# Patient Record
Sex: FEMALE | Race: OTHER | HISPANIC OR LATINO | Employment: FULL TIME | URBAN - METROPOLITAN AREA
[De-identification: names, ages, dates, MRNs, and addresses within clinical notes are randomized per-mention and may not be internally consistent; named-entity substitution may affect disease eponyms.]

---

## 2020-10-15 ENCOUNTER — OFFICE VISIT (OUTPATIENT)
Dept: FAMILY MEDICINE | Facility: CLINIC | Age: 45
End: 2020-10-15
Payer: COMMERCIAL

## 2020-10-15 VITALS
HEIGHT: 60 IN | HEART RATE: 87 BPM | BODY MASS INDEX: 25.73 KG/M2 | OXYGEN SATURATION: 98 % | TEMPERATURE: 98 F | DIASTOLIC BLOOD PRESSURE: 78 MMHG | WEIGHT: 131.06 LBS | SYSTOLIC BLOOD PRESSURE: 104 MMHG

## 2020-10-15 DIAGNOSIS — Z00.00 HEALTH CARE MAINTENANCE: ICD-10-CM

## 2020-10-15 DIAGNOSIS — M25.50 MULTIPLE JOINT PAIN: Primary | ICD-10-CM

## 2020-10-15 PROCEDURE — 3008F PR BODY MASS INDEX (BMI) DOCUMENTED: ICD-10-PCS | Mod: CPTII,S$GLB,, | Performed by: INTERNAL MEDICINE

## 2020-10-15 PROCEDURE — 3008F BODY MASS INDEX DOCD: CPT | Mod: CPTII,S$GLB,, | Performed by: INTERNAL MEDICINE

## 2020-10-15 PROCEDURE — 99999 PR PBB SHADOW E&M-NEW PATIENT-LVL III: ICD-10-PCS | Mod: PBBFAC,,, | Performed by: INTERNAL MEDICINE

## 2020-10-15 PROCEDURE — 99204 OFFICE O/P NEW MOD 45 MIN: CPT | Mod: S$GLB,,, | Performed by: INTERNAL MEDICINE

## 2020-10-15 PROCEDURE — 99204 PR OFFICE/OUTPT VISIT, NEW, LEVL IV, 45-59 MIN: ICD-10-PCS | Mod: S$GLB,,, | Performed by: INTERNAL MEDICINE

## 2020-10-15 PROCEDURE — 99999 PR PBB SHADOW E&M-NEW PATIENT-LVL III: CPT | Mod: PBBFAC,,, | Performed by: INTERNAL MEDICINE

## 2020-10-15 RX ORDER — IBUPROFEN 400 MG/1
400 TABLET ORAL EVERY 4 HOURS
COMMUNITY
End: 2020-10-21

## 2020-10-15 NOTE — ASSESSMENT & PLAN NOTE
Pt with polyarticular edema and warmth of acute onset, will send for basic rheum work up and acute hep panel, urgent rheum ref requested

## 2020-10-15 NOTE — PROGRESS NOTES
[facility] Note    Chief Complaint    No chief complaint on file.    History of Present Illness      Mckenna Martinez is a 44 y.o. female who presents today for chief complaint est care. Patient is new to me.    PCP: Primary Doctor No  Patient comes to appointment alone .     HPI   Multiple joint pain: bilat shoulders, bilateral wrists, ankles, knees, has been having joint pain for about 2 weeks, she originally attributed to exercise, tried to cut back but pain persisted. 2d ago awoke with diffuse joint pain and edema  rom limited by edema, mildly painful, achy type pain, no fever, no nusea vomiting abd pain no rash   Has h/o eczema - sees a  at  using dupexient   No h/o blood clots  No rash, no oral ulcers  No photosensitive rash   np fam hx autoimmune disease     Sees Gyn Dr Ankush León   Active Problem List with Overview Notes    Diagnosis Date Noted    Multiple joint pain 10/15/2020       Health Maintenance   Topic Date Due    Hepatitis C Screening  1975    Lipid Panel  1975    TETANUS VACCINE  10/19/1992    Mammogram  10/19/2014       History reviewed. No pertinent past medical history.    History reviewed. No pertinent surgical history.    family history is not on file.    Social History     Tobacco Use    Smoking status: Never Smoker    Smokeless tobacco: Never Used   Substance Use Topics    Alcohol use: Not on file    Drug use: Not on file       Review of Systems   Constitutional: Negative for chills, fever, malaise/fatigue and weight loss.   Respiratory: Negative for cough, sputum production, shortness of breath and wheezing.    Gastrointestinal: Negative for abdominal pain, diarrhea, nausea and vomiting.   Musculoskeletal: Positive for joint pain, myalgias and neck pain. Negative for back pain.   Skin: Negative for rash.        Outpatient Encounter Medications as of 10/15/2020   Medication Sig Dispense Refill    ibuprofen (ADVIL,MOTRIN) 400 MG  tablet Take 400 mg by mouth every 4 (four) hours.      NAPROXEN ORAL Take 200 mg by mouth.       No facility-administered encounter medications on file as of 10/15/2020.         Review of patient's allergies indicates:  No Known Allergies        Physical Exam      Vital Signs  Temp: 98 °F (36.7 °C)  Temp src: Oral  Pulse: 87  SpO2: 98 %  BP: 104/78  Pain Score:   6  Pain Loc: Shoulder(shoulder and body pain)  Height and Weight  Height: 5' (152.4 cm)  Weight: 59.5 kg (131 lb 1 oz)  BSA (Calculated - sq m): 1.59 sq meters  BMI (Calculated): 25.6  Weight in (lb) to have BMI = 25: 127.7]    Physical Exam  Constitutional:       Appearance: She is well-developed.   HENT:      Head: Normocephalic and atraumatic.      Right Ear: External ear normal.      Left Ear: External ear normal.   Eyes:      General:         Right eye: No discharge.         Left eye: No discharge.   Neck:      Musculoskeletal: Normal range of motion.      Thyroid: No thyromegaly.   Cardiovascular:      Rate and Rhythm: Normal rate and regular rhythm.      Heart sounds: Normal heart sounds. No murmur.   Pulmonary:      Effort: Pulmonary effort is normal. No respiratory distress.      Breath sounds: Normal breath sounds.   Abdominal:      General: Bowel sounds are normal. There is no distension.      Palpations: Abdomen is soft.      Tenderness: There is no abdominal tenderness.   Musculoskeletal: Normal range of motion.         General: Swelling and tenderness present. No deformity.      Comments: bilat ankle and knee edmea, bilat wrist edema r>l no erythema, r wrsit r ankle warm to touch      Skin:     General: Skin is warm and dry.      Findings: Bruising present. No erythema or rash.   Neurological:      Mental Status: She is alert and oriented to person, place, and time.   Psychiatric:         Behavior: Behavior normal.          Laboratory & Radiology:  None for review       Assessment/Plan     Mckenna Martinez is a 44 y.o.female with:    Multiple  joint pain  Pt with polyarticular edema and warmth of acute onset, will send for basic rheum work up and acute hep panel, urgent rheum ref requested       Orders Placed This Encounter   Procedures    CBC auto differential     Standing Status:   Future     Number of Occurrences:   1     Standing Expiration Date:   12/14/2021    Comprehensive Metabolic Panel     Standing Status:   Future     Number of Occurrences:   1     Standing Expiration Date:   12/14/2021    REAGAN Screen w/Reflex     Standing Status:   Future     Number of Occurrences:   1     Standing Expiration Date:   12/14/2021    Sedimentation rate     Standing Status:   Future     Number of Occurrences:   1     Standing Expiration Date:   12/14/2021    C-reactive protein     Standing Status:   Future     Number of Occurrences:   1     Standing Expiration Date:   12/14/2021    Hepatitis Panel, Acute     Standing Status:   Future     Number of Occurrences:   1     Standing Expiration Date:   12/14/2021    Rheumatoid Factor     Standing Status:   Future     Number of Occurrences:   1     Standing Expiration Date:   12/14/2021    CYCLIC CITRUL PEPTIDE ANTIBODY, IGG     Standing Status:   Future     Number of Occurrences:   1     Standing Expiration Date:   12/14/2021    Uric Acid     Standing Status:   Future     Number of Occurrences:   1     Standing Expiration Date:   12/14/2021    Lipid Panel     Standing Status:   Future     Number of Occurrences:   1     Standing Expiration Date:   12/14/2021    Ambulatory referral/consult to Rheumatology     Standing Status:   Future     Standing Expiration Date:   11/15/2021     Referral Priority:   Urgent     Referral Type:   Consultation     Referral Reason:   Specialty Services Required     Requested Specialty:   Rheumatology     Number of Visits Requested:   1       Use of the [facility] Patient Portal discussed and encouraged during today's visit  -Continue current medications and maintain follow up with  specialists.  Return to clinic in  months.  Future Appointments   Date Time Provider Department Center   1/6/2021  1:00 PM Monique Earl MD [facility] RHEUM [facility]       Faby Zambrano MD  10/15/2020 2:19 PM    Primary Care Internal Medicine - [facility]

## 2020-10-15 NOTE — Clinical Note
Scooby Jj - this pt came to clinic today with acute onset mult joint pain, it started a few days ago, on exam she has multiple joint effusions and some joints that are warm to touch. I sent her for an initial work up but her presentation concerned me and so I wanted to see if you or someone else could see her soon? She has an appt with you in Jan which was the soonest available  Thanks  Faby Zambrano

## 2020-10-16 ENCOUNTER — TELEPHONE (OUTPATIENT)
Dept: ADMINISTRATIVE | Facility: HOSPITAL | Age: 45
End: 2020-10-16

## 2020-10-16 ENCOUNTER — PATIENT MESSAGE (OUTPATIENT)
Dept: RHEUMATOLOGY | Facility: CLINIC | Age: 45
End: 2020-10-16

## 2020-10-16 DIAGNOSIS — Z12.31 OTHER SCREENING MAMMOGRAM: ICD-10-CM

## 2020-10-16 NOTE — LETTER
AUTHORIZATION FOR RELEASE OF   CONFIDENTIAL INFORMATION    Dear [facility],    We are seeing Mckenna Martinez, date of birth 1975, in the clinic at [facility]. Faby Zambrano MD is the patient's PCP. Mckenna Martinez has an outstanding lab/procedure at the time we reviewed her chart. In order to help keep her health information updated, she has authorized us to request the following medical record(s):        ( x )  MAMMOGRAM                                      (  )  COLONOSCOPY      ( x )  PAP SMEAR                                          (  )  OUTSIDE LAB RESULTS     (  )  DEXA SCAN                                          (  )  EYE EXAM            (  )  FOOT EXAM                                          (  )  ENTIRE RECORD     (  )  OUTSIDE IMMUNIZATIONS                 (  )  _______________         Please fax records to [facility], Abby E. Gandolfi, MD, [phone]     If you have any questions, please contact Eileen at [phone].           Patient Name: Mckenna Martinez  : 1975  Patient Phone #: [phone]

## 2020-10-16 NOTE — TELEPHONE ENCOUNTER
Called and spoke with pt in regards of her message. Pt states that she is returning a call from rheumatology in regards of getting pt in sooner for her appt. Informed pt to call the phone number that the message came from in regards of message. Pt verbalized understanding of message.

## 2020-10-16 NOTE — TELEPHONE ENCOUNTER
----- Message from Amber Cohen sent at 10/16/2020 10:53 AM CDT -----  Regarding: pt advice  Contact: pt @ [phone]  Pt returning missed call from Dr. Zambrano's office, please call.

## 2020-10-19 ENCOUNTER — PATIENT MESSAGE (OUTPATIENT)
Dept: RHEUMATOLOGY | Facility: CLINIC | Age: 45
End: 2020-10-19

## 2020-10-19 ENCOUNTER — LAB VISIT (OUTPATIENT)
Dept: LAB | Facility: HOSPITAL | Age: 45
End: 2020-10-19
Attending: INTERNAL MEDICINE
Payer: COMMERCIAL

## 2020-10-19 ENCOUNTER — OFFICE VISIT (OUTPATIENT)
Dept: RHEUMATOLOGY | Facility: CLINIC | Age: 45
End: 2020-10-19
Payer: COMMERCIAL

## 2020-10-19 VITALS
TEMPERATURE: 98 F | SYSTOLIC BLOOD PRESSURE: 109 MMHG | HEART RATE: 70 BPM | WEIGHT: 131.63 LBS | HEIGHT: 60 IN | DIASTOLIC BLOOD PRESSURE: 56 MMHG | BODY MASS INDEX: 25.84 KG/M2

## 2020-10-19 DIAGNOSIS — R76.8 POSITIVE ANA (ANTINUCLEAR ANTIBODY): ICD-10-CM

## 2020-10-19 DIAGNOSIS — M79.10 MYALGIA: ICD-10-CM

## 2020-10-19 DIAGNOSIS — M25.50 MULTIPLE JOINT PAIN: ICD-10-CM

## 2020-10-19 DIAGNOSIS — M25.50 MULTIPLE JOINT PAIN: Primary | ICD-10-CM

## 2020-10-19 LAB
BASOPHILS # BLD AUTO: 0.01 K/UL (ref 0–0.2)
BASOPHILS NFR BLD: 0.2 % (ref 0–1.9)
C3 SERPL-MCNC: 138 MG/DL (ref 50–180)
C4 SERPL-MCNC: 36 MG/DL (ref 11–44)
CK SERPL-CCNC: 27 U/L (ref 20–180)
DAT IGG-SP REAG RBC-IMP: NORMAL
DIFFERENTIAL METHOD: ABNORMAL
EOSINOPHIL # BLD AUTO: 0 K/UL (ref 0–0.5)
EOSINOPHIL NFR BLD: 0 % (ref 0–8)
ERYTHROCYTE [DISTWIDTH] IN BLOOD BY AUTOMATED COUNT: 18.5 % (ref 11.5–14.5)
FERRITIN SERPL-MCNC: 2 NG/ML (ref 20–300)
FOLATE SERPL-MCNC: 6.1 NG/ML (ref 4–24)
HAPTOGLOB SERPL-MCNC: 254 MG/DL (ref 30–250)
HCT VFR BLD AUTO: 31.2 % (ref 37–48.5)
HGB BLD-MCNC: 9 G/DL (ref 12–16)
IMM GRANULOCYTES # BLD AUTO: 0.02 K/UL (ref 0–0.04)
IMM GRANULOCYTES NFR BLD AUTO: 0.4 % (ref 0–0.5)
IRON SERPL-MCNC: 13 UG/DL (ref 30–160)
LDH SERPL L TO P-CCNC: 150 U/L (ref 110–260)
LYMPHOCYTES # BLD AUTO: 1.2 K/UL (ref 1–4.8)
LYMPHOCYTES NFR BLD: 24.8 % (ref 18–48)
MCH RBC QN AUTO: 22.1 PG (ref 27–31)
MCHC RBC AUTO-ENTMCNC: 28.8 G/DL (ref 32–36)
MCV RBC AUTO: 77 FL (ref 82–98)
MONOCYTES # BLD AUTO: 0.5 K/UL (ref 0.3–1)
MONOCYTES NFR BLD: 10.7 % (ref 4–15)
NEUTROPHILS # BLD AUTO: 3.2 K/UL (ref 1.8–7.7)
NEUTROPHILS NFR BLD: 63.9 % (ref 38–73)
NRBC BLD-RTO: 0 /100 WBC
PATH REV BLD -IMP: NORMAL
PLATELET # BLD AUTO: 363 K/UL (ref 150–350)
PMV BLD AUTO: 11.9 FL (ref 9.2–12.9)
RBC # BLD AUTO: 4.07 M/UL (ref 4–5.4)
RETICS/RBC NFR AUTO: 1.3 % (ref 0.5–2.5)
SATURATED IRON: 3 % (ref 20–50)
TOTAL IRON BINDING CAPACITY: 383 UG/DL (ref 250–450)
TRANSFERRIN SERPL-MCNC: 259 MG/DL (ref 200–375)
VIT B12 SERPL-MCNC: 475 PG/ML (ref 210–950)
WBC # BLD AUTO: 4.96 K/UL (ref 3.9–12.7)

## 2020-10-19 PROCEDURE — 86160 COMPLEMENT ANTIGEN: CPT | Mod: 59

## 2020-10-19 PROCEDURE — 3008F BODY MASS INDEX DOCD: CPT | Mod: CPTII,S$GLB,, | Performed by: INTERNAL MEDICINE

## 2020-10-19 PROCEDURE — 83010 ASSAY OF HAPTOGLOBIN QUANT: CPT

## 2020-10-19 PROCEDURE — 86787 VARICELLA-ZOSTER ANTIBODY: CPT

## 2020-10-19 PROCEDURE — 83540 ASSAY OF IRON: CPT

## 2020-10-19 PROCEDURE — 99204 OFFICE O/P NEW MOD 45 MIN: CPT | Mod: 25,S$GLB,, | Performed by: INTERNAL MEDICINE

## 2020-10-19 PROCEDURE — 86162 COMPLEMENT TOTAL (CH50): CPT

## 2020-10-19 PROCEDURE — 86160 COMPLEMENT ANTIGEN: CPT

## 2020-10-19 PROCEDURE — 86146 BETA-2 GLYCOPROTEIN ANTIBODY: CPT | Mod: 59

## 2020-10-19 PROCEDURE — 84238 ASSAY NONENDOCRINE RECEPTOR: CPT

## 2020-10-19 PROCEDURE — 3008F PR BODY MASS INDEX (BMI) DOCUMENTED: ICD-10-PCS | Mod: CPTII,S$GLB,, | Performed by: INTERNAL MEDICINE

## 2020-10-19 PROCEDURE — 82746 ASSAY OF FOLIC ACID SERUM: CPT

## 2020-10-19 PROCEDURE — 85060 BLOOD SMEAR INTERPRETATION: CPT | Mod: ,,, | Performed by: PATHOLOGY

## 2020-10-19 PROCEDURE — 96372 PR INJECTION,THERAP/PROPH/DIAG2ST, IM OR SUBCUT: ICD-10-PCS | Mod: S$GLB,,, | Performed by: INTERNAL MEDICINE

## 2020-10-19 PROCEDURE — 82085 ASSAY OF ALDOLASE: CPT

## 2020-10-19 PROCEDURE — 83615 LACTATE (LD) (LDH) ENZYME: CPT

## 2020-10-19 PROCEDURE — 85025 COMPLETE CBC W/AUTO DIFF WBC: CPT

## 2020-10-19 PROCEDURE — 82607 VITAMIN B-12: CPT

## 2020-10-19 PROCEDURE — 82728 ASSAY OF FERRITIN: CPT

## 2020-10-19 PROCEDURE — 86703 HIV-1/HIV-2 1 RESULT ANTBDY: CPT

## 2020-10-19 PROCEDURE — 83516 IMMUNOASSAY NONANTIBODY: CPT | Mod: 59

## 2020-10-19 PROCEDURE — 86592 SYPHILIS TEST NON-TREP QUAL: CPT

## 2020-10-19 PROCEDURE — 86880 COOMBS TEST DIRECT: CPT

## 2020-10-19 PROCEDURE — 83520 IMMUNOASSAY QUANT NOS NONAB: CPT

## 2020-10-19 PROCEDURE — 86235 NUCLEAR ANTIGEN ANTIBODY: CPT

## 2020-10-19 PROCEDURE — 85060 PATHOLOGIST REVIEW: ICD-10-PCS | Mod: ,,, | Performed by: PATHOLOGY

## 2020-10-19 PROCEDURE — 99999 PR PBB SHADOW E&M-EST. PATIENT-LVL IV: ICD-10-PCS | Mod: PBBFAC,,,

## 2020-10-19 PROCEDURE — 86682 HELMINTH ANTIBODY: CPT

## 2020-10-19 PROCEDURE — 99999 PR PBB SHADOW E&M-EST. PATIENT-LVL IV: CPT | Mod: PBBFAC,,,

## 2020-10-19 PROCEDURE — 99204 PR OFFICE/OUTPT VISIT, NEW, LEVL IV, 45-59 MIN: ICD-10-PCS | Mod: 25,S$GLB,, | Performed by: INTERNAL MEDICINE

## 2020-10-19 PROCEDURE — 96372 THER/PROPH/DIAG INJ SC/IM: CPT | Mod: S$GLB,,, | Performed by: INTERNAL MEDICINE

## 2020-10-19 PROCEDURE — 86704 HEP B CORE ANTIBODY TOTAL: CPT

## 2020-10-19 PROCEDURE — 85045 AUTOMATED RETICULOCYTE COUNT: CPT

## 2020-10-19 PROCEDURE — 82550 ASSAY OF CK (CPK): CPT

## 2020-10-19 PROCEDURE — 86790 VIRUS ANTIBODY NOS: CPT

## 2020-10-19 PROCEDURE — 86147 CARDIOLIPIN ANTIBODY EA IG: CPT | Mod: 59

## 2020-10-19 PROCEDURE — 85598 HEXAGNAL PHOSPH PLTLT NEUTRL: CPT

## 2020-10-19 PROCEDURE — 85613 RUSSELL VIPER VENOM DILUTED: CPT

## 2020-10-19 RX ORDER — PREDNISONE 2.5 MG/1
10 TABLET ORAL DAILY
Qty: 120 TABLET | Refills: 1 | Status: SHIPPED | OUTPATIENT
Start: 2020-10-19 | End: 2020-11-12

## 2020-10-19 RX ORDER — TRIAMCINOLONE ACETONIDE 40 MG/ML
40 INJECTION, SUSPENSION INTRA-ARTICULAR; INTRAMUSCULAR
Status: COMPLETED | OUTPATIENT
Start: 2020-10-19 | End: 2020-10-19

## 2020-10-19 RX ORDER — ACETAMINOPHEN 325 MG/1
325 TABLET ORAL EVERY 6 HOURS PRN
COMMUNITY
End: 2021-01-19

## 2020-10-19 RX ADMIN — TRIAMCINOLONE ACETONIDE 40 MG: 40 INJECTION, SUSPENSION INTRA-ARTICULAR; INTRAMUSCULAR at 09:10

## 2020-10-19 NOTE — PROGRESS NOTES
No skin rashes, malar rash, photosensitivity   No telangiectasias   No calcinosis   No psoriasis   No patchy alopecia   No oral and nasal ulcers   No dry eyes and dry mouth   No pleurisy or any cardiopulmonary complaints   No dysphagia,diplopia and dysphonia and muscle weakness   No n/v/d/c   No acid reflux  No raynaud's  No digital ulcers   No cytopenias   No renal issues   No blood clots   No fever,chills,night sweats,weight loss and loss of appetite   No pregnancy losses/pre term deliveries /pregnancy complications   No new onset headaches   No recurrent conjunctivitis or uveitis or scleritis or episcleritis   No chronic or bloody diarrhea with no u colitis or crohn's /inflammatory bowel disease   No vaginal or penile and urethral  d/c/STDs/no ulcers

## 2020-10-19 NOTE — PROGRESS NOTES
Subjective:       Patient ID: Mckenna Peraza is a 45 y.o. female.    Chief Complaint: Disease Management    HPI   Pt is a 46yo F who presents after being referred from her PCP to be further worked up for her diffuse arthralgias.  Patient presented to her PCP on 10/15 and was having pain in her shoulders, wrists, ankles, knees.  She stated at that time that it had been going on for about two weeks before her visit.  On 10/13 she felt things got much worse and she was having swelling of her hands.  Denies any fever, N/V, abdominal pain or rashes.  She took some naproxen and it helped a little bit but not very much.  She reports stiffness that will last up to half the day.  Tylenol has provided minimal relief.  She states she feels handicapped and is usually very active.  Patient reports that she has had bad eczema for the last 4 years, however it has been well controlled since started Dupilumab which was started at the end of April.      FamHx: Denies any family history of autoimmune illnesses that she knows of    SocHx: Denies tobacco, alcohol, drugs. Occupation: Works as  at [employer]    Review of Systems    No skin rashes, malar rash, photosensitivity   No telangiectasias   No calcinosis   No psoriasis   No patchy alopecia   No oral and nasal ulcers   No dry eyes and dry mouth   No pleurisy or any cardiopulmonary complaints   No dysphagia,diplopia and dysphonia and muscle weakness   No n/v/d/c   No acid reflux  No raynaud's  No digital ulcers   No cytopenias   No renal issues   No blood clots   No fever,chills,night sweats,weight loss and loss of appetite   No pregnancy losses/pre term deliveries /pregnancy complications   No new onset headaches   No recurrent conjunctivitis or uveitis or scleritis or episcleritis   No chronic or bloody diarrhea with no u colitis or crohn's /inflammatory bowel disease   No vaginal or urethral  d/c/STDs/no ulcers     Objective:   BP (!) 109/56 (BP Location: Left arm,  Patient Position: Sitting, BP Method: Medium (Automatic))   Pulse 70   Temp 98.4 °F (36.9 °C) (Oral)   Ht 5' (1.524 m)   Wt 59.7 kg (131 lb 9.8 oz)   LMP 09/22/2020   BMI 25.70 kg/m²      Physical Exam   Nursing note and vitals reviewed.  Constitutional: She is oriented to person, place, and time and well-developed, well-nourished, and in no distress. No distress.   HENT:   Head: Normocephalic and atraumatic.   Mouth/Throat: Oropharynx is clear and moist. No oropharyngeal exudate.   Eyes: Conjunctivae and EOM are normal. Pupils are equal, round, and reactive to light. Right eye exhibits no discharge. Left eye exhibits no discharge. No scleral icterus.   Neck: Normal range of motion. Neck supple.   Cardiovascular: Normal rate, regular rhythm and intact distal pulses.    Pulmonary/Chest: Effort normal and breath sounds normal. No respiratory distress. She has no wheezes. She has no rales.   Abdominal: Soft. She exhibits no distension. There is no abdominal tenderness. There is no rebound and no guarding.   Neurological: She is alert and oriented to person, place, and time.   Skin: Skin is warm and dry. No rash noted. She is not diaphoretic. No erythema.     Psychiatric: Mood and affect normal.   Musculoskeletal: Tenderness present.               10/19/2020   PEREZ-28 (ESR) --   PEREZ-28 (CRP) --   Tender (PEREZ-28) 21 / 28    Swollen (PEREZ-28) 19 / 28    Provider Global --   Patient Global --   ESR 29 mm/hr   CRP 7.4 mg/L        Assessment:       1. Multiple joint pain    2. Positive REAGAN (antinuclear antibody)    3. Myalgia            Plan:       Problem List Items Addressed This Visit        Immunology/Multi System    Positive REAGAN (antinuclear antibody)    Relevant Medications    predniSONE (DELTASONE) 2.5 MG tablet    Other Relevant Orders    C3 Complement (Completed)    C4 Complement (Completed)    CK (Completed)    Aldolase    Urinalysis (Completed)    Protein/Creatinine Ratio, Urine (Completed)    Lactate  dehydrogenase (Completed)    Haptoglobin (Completed)    Reticulocytes (Completed)    Pathologist Interpretation Differential (Completed)    Ferritin (Completed)    Iron and TIBC (Completed)    Vitamin B12 (Completed)    Folate (Completed)    Direct antiglobulin test (Completed)    SOLUBLE TRANSFERRIN RECEPTOR    DRVVT    Cardiolipin antibody    Beta-2 Glycoprotein Abs (IgA, IgG, IgM)    Complement, total    MyoMarker Panel 3    X-Ray Chest PA And Lateral    HIV 1/2 Ag/Ab (4th Gen) (Completed)    HBcAB    Hepatitis A antibody, IgG    Strongyloides IgG Antibodies    Quantiferon Gold TB    RPR    Varicella zoster antibody, IgG       Orthopedic    Multiple joint pain - Primary     Pt complaining of diffuse arthralgias of recent onset with numerous tender and swollen joints  - Labs were checked by PCP:    - REAGAN was positive with homogenous 1:640 and speckled 1:640 (Profile pending)   - ESR 29, CRP 7.4, RF and CCP Neg, Uric Acid 3.5  - We will check C3, C4, CH50, Ua, Urine Pr/CR, SSA  - Patient also with anemia - We will check anemia labs  - Chest xray  - We will provide Kenalog injection today and prescription for 10mg Prednisone daily         Relevant Medications    predniSONE (DELTASONE) 2.5 MG tablet    Other Relevant Orders    X-Ray Chest PA And Lateral    HIV 1/2 Ag/Ab (4th Gen) (Completed)    HBcAB    Hepatitis A antibody, IgG    Strongyloides IgG Antibodies    Quantiferon Gold TB    RPR    Varicella zoster antibody, IgG    Myalgia     We will check CK, Aldolase, Myomarker panel         Relevant Medications    predniSONE (DELTASONE) 2.5 MG tablet    Other Relevant Orders    MyoMarker Panel 3    X-Ray Chest PA And Lateral    HIV 1/2 Ag/Ab (4th Gen) (Completed)    HBcAB    Hepatitis A antibody, IgG    Strongyloides IgG Antibodies    Quantiferon Gold TB    RPR    Varicella zoster antibody, IgG                RTC in 2 weeks    Patient discussed with Dr. Jordan Escamilla MD, PGY-4  [facility]

## 2020-10-20 ENCOUNTER — PATIENT MESSAGE (OUTPATIENT)
Dept: RHEUMATOLOGY | Facility: CLINIC | Age: 45
End: 2020-10-20

## 2020-10-20 PROBLEM — M79.10 MYALGIA: Status: ACTIVE | Noted: 2020-10-20

## 2020-10-20 PROBLEM — R76.8 POSITIVE ANA (ANTINUCLEAR ANTIBODY): Status: ACTIVE | Noted: 2020-10-20

## 2020-10-20 LAB
HBV CORE AB SERPL QL IA: NEGATIVE
HEPATITIS A ANTIBODY, IGG: POSITIVE
HIV 1+2 AB+HIV1 P24 AG SERPL QL IA: NEGATIVE
PATH REV BLD -IMP: NORMAL
RPR SER QL: NORMAL
VARICELLA INTERPRETATION: POSITIVE
VARICELLA ZOSTER IGG: 3.32 ISR (ref 0–0.9)

## 2020-10-20 NOTE — ASSESSMENT & PLAN NOTE
Pt complaining of diffuse arthralgias of recent onset with numerous tender and swollen joints  - Labs were checked by PCP:    - REAGAN was positive with homogenous 1:640 and speckled 1:640 (Profile pending)   - ESR 29, CRP 7.4, RF and CCP Neg, Uric Acid 3.5  - We will check C3, C4, CH50, Ua, Urine Pr/CR, SSA  - Patient also with anemia - We will check anemia labs  - Chest xray  - We will provide Kenalog injection today and prescription for 10mg Prednisone daily

## 2020-10-21 ENCOUNTER — TELEPHONE (OUTPATIENT)
Dept: FAMILY MEDICINE | Facility: CLINIC | Age: 45
End: 2020-10-21

## 2020-10-21 ENCOUNTER — OFFICE VISIT (OUTPATIENT)
Dept: OBSTETRICS AND GYNECOLOGY | Facility: CLINIC | Age: 45
End: 2020-10-21
Payer: COMMERCIAL

## 2020-10-21 ENCOUNTER — PATIENT MESSAGE (OUTPATIENT)
Dept: RHEUMATOLOGY | Facility: CLINIC | Age: 45
End: 2020-10-21

## 2020-10-21 VITALS
SYSTOLIC BLOOD PRESSURE: 126 MMHG | WEIGHT: 129.81 LBS | BODY MASS INDEX: 25.48 KG/M2 | HEIGHT: 60 IN | DIASTOLIC BLOOD PRESSURE: 88 MMHG

## 2020-10-21 DIAGNOSIS — N92.0 MENORRHAGIA WITH REGULAR CYCLE: ICD-10-CM

## 2020-10-21 DIAGNOSIS — Z12.31 BREAST CANCER SCREENING BY MAMMOGRAM: ICD-10-CM

## 2020-10-21 DIAGNOSIS — D50.9 IRON DEFICIENCY ANEMIA, UNSPECIFIED IRON DEFICIENCY ANEMIA TYPE: ICD-10-CM

## 2020-10-21 DIAGNOSIS — Z01.419 ENCOUNTER FOR ANNUAL ROUTINE GYNECOLOGICAL EXAMINATION: Primary | ICD-10-CM

## 2020-10-21 LAB
ALDOLASE SERPL-CCNC: 2.6 U/L (ref 1.2–7.6)
STFR SERPL-MCNC: 11.4 MG/L (ref 1.8–4.6)
STRONGYLOIDES ANTIBODY IGG: NEGATIVE

## 2020-10-21 PROCEDURE — 99999 PR PBB SHADOW E&M-EST. PATIENT-LVL III: ICD-10-PCS | Mod: PBBFAC,,, | Performed by: OBSTETRICS & GYNECOLOGY

## 2020-10-21 PROCEDURE — 99386 PR PREVENTIVE VISIT,NEW,40-64: ICD-10-PCS | Mod: S$GLB,,, | Performed by: OBSTETRICS & GYNECOLOGY

## 2020-10-21 PROCEDURE — 3008F BODY MASS INDEX DOCD: CPT | Mod: CPTII,S$GLB,, | Performed by: OBSTETRICS & GYNECOLOGY

## 2020-10-21 PROCEDURE — 88175 CYTOPATH C/V AUTO FLUID REDO: CPT

## 2020-10-21 PROCEDURE — 99386 PREV VISIT NEW AGE 40-64: CPT | Mod: S$GLB,,, | Performed by: OBSTETRICS & GYNECOLOGY

## 2020-10-21 PROCEDURE — 87624 HPV HI-RISK TYP POOLED RSLT: CPT

## 2020-10-21 PROCEDURE — 99999 PR PBB SHADOW E&M-EST. PATIENT-LVL III: CPT | Mod: PBBFAC,,, | Performed by: OBSTETRICS & GYNECOLOGY

## 2020-10-21 PROCEDURE — 3008F PR BODY MASS INDEX (BMI) DOCUMENTED: ICD-10-PCS | Mod: CPTII,S$GLB,, | Performed by: OBSTETRICS & GYNECOLOGY

## 2020-10-21 NOTE — PATIENT INSTRUCTIONS
Levonorgestrel intrauterine device (IUD)  What is this medicine?  LEVONORGESTREL IUD (JOSEFINA elena) is a contraceptive (birth control) device. The device is placed inside the uterus by a healthcare professional. It is used to prevent pregnancy. This device can also be used to treat heavy bleeding that occurs during your period.  How should I use this medicine?  This device is placed inside the uterus by a health care professional.  Talk to your pediatrician regarding the use of this medicine in children. Special care may be needed.  What side effects may I notice from receiving this medicine?  Side effects that you should report to your doctor or health care professional as soon as possible:  · allergic reactions like skin rash, itching or hives, swelling of the face, lips, or tongue  · fever, flu-like symptoms  · genital sores  · high blood pressure  · no menstrual period for 6 weeks during use  · pain, swelling, warmth in the leg  · pelvic pain or tenderness  · severe or sudden headache  · signs of pregnancy  · stomach cramping  · sudden shortness of breath  · trouble with balance, talking, or walking  · unusual vaginal bleeding, discharge  · yellowing of the eyes or skin  Side effects that usually do not require medical attention (report to your doctor or health care professional if they continue or are bothersome):  · acne  · breast pain  · change in sex drive or performance  · changes in weight  · cramping, dizziness, or faintness while the device is being inserted  · headache  · irregular menstrual bleeding within first 3 to 6 months of use  · nausea  What may interact with this medicine?  Do not take this medicine with any of the following medications:  · amprenavir  · bosentan  · fosamprenavir  This medicine may also interact with the following medications:  · aprepitant  · barbiturate medicines for inducing sleep or treating seizures  · bexarotene  · griseofulvin  · medicines to treat seizures  like carbamazepine, ethotoin, felbamate, oxcarbazepine, phenytoin, topiramate  · modafinil  · pioglitazone  · rifabutin  · rifampin  · rifapentine  · some medicines to treat HIV infection like atazanavir, indinavir, lopinavir, nelfinavir, tipranavir, ritonavir  · Magui's wort  · warfarin  What if I miss a dose?  This does not apply. Depending on the brand of device you have inserted, the device will need to be replaced every 3 to 5 years if you wish to continue using this type of birth control.  Where should I keep my medicine?  This does not apply.  What should I tell my health care provider before I take this medicine?  They need to know if you have any of these conditions:  · abnormal Pap smear  · cancer of the breast, uterus, or cervix  · diabetes  · endometritis  · genital or pelvic infection now or in the past  · have more than one sexual partner or your partner has more than one partner  · heart disease  · history of an ectopic or tubal pregnancy  · immune system problems  · IUD in place  · liver disease or tumor  · problems with blood clots or take blood-thinners  · use intravenous drugs  · uterus of unusual shape  · vaginal bleeding that has not been explained  · an unusual or allergic reaction to levonorgestrel, other hormones, silicone, or polyethylene, medicines, foods, dyes, or preservatives  · pregnant or trying to get pregnant  · breast-feeding  What should I watch for while using this medicine?  Visit your doctor or health care professional for regular check ups. See your doctor if you or your partner has sexual contact with others, becomes HIV positive, or gets a sexual transmitted disease.  This product does not protect you against HIV infection (AIDS) or other sexually transmitted diseases.  You can check the placement of the IUD yourself by reaching up to the top of your vagina with clean fingers to feel the threads. Do not pull on the threads. It is a good habit to check placement after each  menstrual period. Call your doctor right away if you feel more of the IUD than just the threads or if you cannot feel the threads at all.  The IUD may come out by itself. You may become pregnant if the device comes out. If you notice that the IUD has come out use a backup birth control method like condoms and call your health care provider.  Using tampons will not change the position of the IUD and are okay to use during your period.  This IUD can be safely scanned with magnetic resonance imaging (MRI) only under specific conditions. Before you have an MRI, tell your healthcare provider that you have an IUD in place, and which type of IUD you have in place.  NOTE:This sheet is a summary. It may not cover all possible information. If you have questions about this medicine, talk to your doctor, pharmacist, or health care provider. Copyright© 2017 [employer]        Levonorgestrel intrauterine device (IUD)  What is this medicine?  LEVONORGESTREL IUD (JOSEFINA voe nor caprice trel) is a contraceptive (birth control) device. The device is placed inside the uterus by a healthcare professional. It is used to prevent pregnancy. This device can also be used to treat heavy bleeding that occurs during your period.  How should I use this medicine?  This device is placed inside the uterus by a health care professional.  Talk to your pediatrician regarding the use of this medicine in children. Special care may be needed.  What side effects may I notice from receiving this medicine?  Side effects that you should report to your doctor or health care professional as soon as possible:  · allergic reactions like skin rash, itching or hives, swelling of the face, lips, or tongue  · fever, flu-like symptoms  · genital sores  · high blood pressure  · no menstrual period for 6 weeks during use  · pain, swelling, warmth in the leg  · pelvic pain or tenderness  · severe or sudden headache  · signs of pregnancy  · stomach cramping  · sudden  shortness of breath  · trouble with balance, talking, or walking  · unusual vaginal bleeding, discharge  · yellowing of the eyes or skin  Side effects that usually do not require medical attention (report to your doctor or health care professional if they continue or are bothersome):  · acne  · breast pain  · change in sex drive or performance  · changes in weight  · cramping, dizziness, or faintness while the device is being inserted  · headache  · irregular menstrual bleeding within first 3 to 6 months of use  · nausea  What may interact with this medicine?  Do not take this medicine with any of the following medications:  · amprenavir  · bosentan  · fosamprenavir  This medicine may also interact with the following medications:  · aprepitant  · barbiturate medicines for inducing sleep or treating seizures  · bexarotene  · griseofulvin  · medicines to treat seizures like carbamazepine, ethotoin, felbamate, oxcarbazepine, phenytoin, topiramate  · modafinil  · pioglitazone  · rifabutin  · rifampin  · rifapentine  · some medicines to treat HIV infection like atazanavir, indinavir, lopinavir, nelfinavir, tipranavir, ritonavir  · Goodell's wort  · warfarin  What if I miss a dose?  This does not apply. Depending on the brand of device you have inserted, the device will need to be replaced every 3 to 5 years if you wish to continue using this type of birth control.  Where should I keep my medicine?  This does not apply.  What should I tell my health care provider before I take this medicine?  They need to know if you have any of these conditions:  · abnormal Pap smear  · cancer of the breast, uterus, or cervix  · diabetes  · endometritis  · genital or pelvic infection now or in the past  · have more than one sexual partner or your partner has more than one partner  · heart disease  · history of an ectopic or tubal pregnancy  · immune system problems  · IUD in place  · liver disease or tumor  · problems with blood clots  or take blood-thinners  · use intravenous drugs  · uterus of unusual shape  · vaginal bleeding that has not been explained  · an unusual or allergic reaction to levonorgestrel, other hormones, silicone, or polyethylene, medicines, foods, dyes, or preservatives  · pregnant or trying to get pregnant  · breast-feeding  What should I watch for while using this medicine?  Visit your doctor or health care professional for regular check ups. See your doctor if you or your partner has sexual contact with others, becomes HIV positive, or gets a sexual transmitted disease.  This product does not protect you against HIV infection (AIDS) or other sexually transmitted diseases.  You can check the placement of the IUD yourself by reaching up to the top of your vagina with clean fingers to feel the threads. Do not pull on the threads. It is a good habit to check placement after each menstrual period. Call your doctor right away if you feel more of the IUD than just the threads or if you cannot feel the threads at all.  The IUD may come out by itself. You may become pregnant if the device comes out. If you notice that the IUD has come out use a backup birth control method like condoms and call your health care provider.  Using tampons will not change the position of the IUD and are okay to use during your period.  This IUD can be safely scanned with magnetic resonance imaging (MRI) only under specific conditions. Before you have an MRI, tell your healthcare provider that you have an IUD in place, and which type of IUD you have in place.  NOTE:This sheet is a summary. It may not cover all possible information. If you have questions about this medicine, talk to your doctor, pharmacist, or health care provider. Copyright© 2017 [employer]

## 2020-10-21 NOTE — PROGRESS NOTES
Chief Complaint: Well Woman Exam     HPI:      Mckenna Peraza is a 45 y.o.  who presents for annual exam. She has been sent from her PCP for a wellness gyn exam and due to iron deficiency anemia.  She is also being worked up by Rheumatology for multiple joint pain.     She notes that her cycles are regular and bleeding lasts 3-4 days.  Notes that she uses up to 8-10 pads per day on her heaviest day and that she has had no change to her cycles. She reports changing pads before they are fully saturated but that she will at times over saturate pads with spillage. Reports clots up to ping pong ball sized.  Denies lightheadedness or dizziness.     Ms. Peraza is currently sexually active with a single male partner. She would like STD screening today. Patient has regular monthly menses. Patient's last menstrual period was 10/14/2020. She is currently using bilateral tubal ligation for contraception.    Previous Pap:  no abnormalities per patient (No result found)  Previous Mammogram: Pending  Most Recent Dexa: n/a  Colonoscopy: n/a    Gardasil:Has never had     Patient Active Problem List   Diagnosis    Multiple joint pain    Positive REAGAN (antinuclear antibody)    Myalgia       Past Medical History:   Diagnosis Date    Abnormal Pap smear of cervix        Past Surgical History:   Procedure Laterality Date    CERVICAL BIOPSY  W/ LOOP ELECTRODE EXCISION       SECTION      five    LEFT OOPHORECTOMY         OB History        5    Para   5    Term   5            AB        Living   5       SAB        TAB        Ectopic        Multiple        Live Births               Obstetric Comments   14/R/4-5  Hx abnormal pap, s/p LEEP 2011  Hx chlamydia             ROS:     Review of Systems   Constitutional: Negative for activity change, appetite change and fatigue.   Respiratory: Negative for shortness of breath.    Cardiovascular: Negative for chest pain.   Gastrointestinal: Negative for abdominal  pain, constipation and diarrhea.   Endocrine: Negative for cold intolerance and heat intolerance.   Genitourinary: Negative for dysuria, frequency, menstrual irregularity, pelvic pain and vaginal discharge.   Integumentary:  Negative for breast mass, breast discharge and breast tenderness.   Psychiatric/Behavioral: Negative for dysphoric mood. The patient is not nervous/anxious.    Breast: Negative for mass and tenderness      Physical Exam:      PHYSICAL EXAM:  /88   Ht 5' (1.524 m)   Wt 58.9 kg (129 lb 12.8 oz)   LMP 10/14/2020   BMI 25.35 kg/m²   Body mass index is 25.35 kg/m².     APPEARANCE: Well nourished, well developed, in no acute distress.  PSYCH: Appropriate mood and affect.  SKIN: No acne or hirsutism  NECK: Neck symmetric without masses or thyromegaly  NODES: No inguinal, axillary, or supraclavicular lymph node enlargement  CHEST: Normal respiratory effort.  ABDOMEN: Soft.  No tenderness or masses.   BREASTS: Symmetrical, no skin changes or visible lesions.  No palpable masses or nipple discharge bilaterally.  PELVIC: Normal external genitalia without lesions.  Normal hair distribution.  Adequate perineal body, normal urethral meatus.  Vagina moist and well rugated without lesions or discharge.  Cervix pink, without lesions, discharge or tenderness.  No significant cystocele or rectocele.  Bimanual exam shows uterus to be normal size, regular, mobile and nontender.  Adnexa without masses or tenderness.    EXTREMITIES: No edema.    Lab Results   Component Value Date    WBC 4.96 10/19/2020    HGB 9.0 (L) 10/19/2020    HCT 31.2 (L) 10/19/2020    MCV 77 (L) 10/19/2020     (H) 10/19/2020       No results found for: TSH       Assessment:     1. Encounter for annual routine gynecological examination  Liquid-Based Pap Smear, Screening    HPV High Risk Genotypes, PCR   2. Breast cancer screening by mammogram     3. Iron deficiency anemia, unspecified iron deficiency anemia type     4.  Menorrhagia with regular cycle  TSH    US Pelvis Comp with Transvag NON-OB (xpd         Plan:     1. Clinical breast exam performed.  2. Pap w HPV.  3. Mammogram up to date.  4. AUB - TSH, Pelvic us.  Follow up in about 2 weeks (around 11/4/2020) for lab and ultrasounf review.    Counseling:     Patient was counseled today on current ASCCP pap guidelines, the recommendation for yearly pelvic exams, healthy diet and exercise routines, breast self awareness and annual mammograms. She is to see her PCP for other health maintenance.       Use of the [facility] [employer] Patient Portal discussed and encouraged during today's visit.       Tila Estevez MD

## 2020-10-21 NOTE — TELEPHONE ENCOUNTER
Called and spoke with pt in regards of needing an 2 week appointment. Pt stated that she made an appt on 10/27/2020 to come into the office to see you in regards of her results.

## 2020-10-21 NOTE — TELEPHONE ENCOUNTER
----- Message from Faby Zambrano MD sent at 10/20/2020 12:16 PM CDT -----  Please call pt to schedule 2 week follow up  ----- Message -----  From: Evan Ortega MD  Sent: 10/19/2020   1:42 PM CDT  To: Faby Zambrano MD, [facility] Staff    The complement levels for active lupus are normal. The iron saturation and ferritin ar both  extremely low, indicating iron deficiency as cause of anemia. Would discuss with Dr. Zambrano your pcp: you may need to see gastroenterology for evaluation of GI tract as source of blood loss, or with heavy menses see your gynecologist and may need to start iron replacement. Folate is normal. The ck muscle enzyme is normal. The labs show no signs of hemolysis as might be seen with lupus. The CBC continues to show moderate iron deficiency anemia. VÍCTOR

## 2020-10-22 LAB
B2 GLYCOPROT1 IGA SER QL: <9 SAU
B2 GLYCOPROT1 IGG SER QL: <9 SGU
B2 GLYCOPROT1 IGM SER QL: <9 SMU
CARDIOLIPIN IGG SER IA-ACNC: <9.4 GPL (ref 0–14.99)
CARDIOLIPIN IGM SER IA-ACNC: 19.9 MPL (ref 0–12.49)

## 2020-10-23 DIAGNOSIS — Z12.31 OTHER SCREENING MAMMOGRAM: ICD-10-CM

## 2020-10-24 LAB
APTT HEX PL PPP: NEGATIVE S
CH50 SERPL-ACNC: 69 U/ML (ref 42–95)

## 2020-10-27 ENCOUNTER — TELEPHONE (OUTPATIENT)
Dept: HEMATOLOGY/ONCOLOGY | Facility: CLINIC | Age: 45
End: 2020-10-27

## 2020-10-27 ENCOUNTER — OFFICE VISIT (OUTPATIENT)
Dept: FAMILY MEDICINE | Facility: CLINIC | Age: 45
End: 2020-10-27
Payer: COMMERCIAL

## 2020-10-27 VITALS
TEMPERATURE: 98 F | BODY MASS INDEX: 25.71 KG/M2 | DIASTOLIC BLOOD PRESSURE: 78 MMHG | RESPIRATION RATE: 16 BRPM | HEIGHT: 60 IN | SYSTOLIC BLOOD PRESSURE: 130 MMHG | HEART RATE: 75 BPM | WEIGHT: 130.94 LBS | OXYGEN SATURATION: 98 %

## 2020-10-27 DIAGNOSIS — D50.0 IRON DEFICIENCY ANEMIA DUE TO CHRONIC BLOOD LOSS: Primary | ICD-10-CM

## 2020-10-27 DIAGNOSIS — K42.9 UMBILICAL HERNIA WITHOUT OBSTRUCTION AND WITHOUT GANGRENE: ICD-10-CM

## 2020-10-27 DIAGNOSIS — R76.8 POSITIVE ANA (ANTINUCLEAR ANTIBODY): ICD-10-CM

## 2020-10-27 DIAGNOSIS — L30.9 ECZEMA, UNSPECIFIED TYPE: ICD-10-CM

## 2020-10-27 LAB — LA PPP-IMP: NORMAL

## 2020-10-27 PROCEDURE — 99214 PR OFFICE/OUTPT VISIT, EST, LEVL IV, 30-39 MIN: ICD-10-PCS | Mod: S$GLB,,, | Performed by: INTERNAL MEDICINE

## 2020-10-27 PROCEDURE — 99999 PR PBB SHADOW E&M-EST. PATIENT-LVL IV: ICD-10-PCS | Mod: PBBFAC,,, | Performed by: INTERNAL MEDICINE

## 2020-10-27 PROCEDURE — 3008F BODY MASS INDEX DOCD: CPT | Mod: CPTII,S$GLB,, | Performed by: INTERNAL MEDICINE

## 2020-10-27 PROCEDURE — 3008F PR BODY MASS INDEX (BMI) DOCUMENTED: ICD-10-PCS | Mod: CPTII,S$GLB,, | Performed by: INTERNAL MEDICINE

## 2020-10-27 PROCEDURE — 99999 PR PBB SHADOW E&M-EST. PATIENT-LVL IV: CPT | Mod: PBBFAC,,, | Performed by: INTERNAL MEDICINE

## 2020-10-27 PROCEDURE — 99214 OFFICE O/P EST MOD 30 MIN: CPT | Mod: S$GLB,,, | Performed by: INTERNAL MEDICINE

## 2020-10-27 NOTE — PROGRESS NOTES
[facility] Note    Chief Complaint      Chief Complaint   Patient presents with    Follow-up     History of Present Illness      Mckenna Peraza is a 45 y.o. female who presents today for chief complaint follow up polyarthritis. Patient comes to appointment alone.     HPI   Saw rheum for inflammatory ployarthritis with pos liam put on steroids, possobly secondary to dupexiemnt   Was found to be profoundly iron def anemic, saw Gyn who is is doing AUB work up withtheyoid and pelvic u/s   Answers for HPI/ROS submitted by the patient on 10/20/2020   activity change: No  unexpected weight change: No  neck pain: No  hearing loss: No  rhinorrhea: No  trouble swallowing: No  eye discharge: No  visual disturbance: No  chest tightness: No  wheezing: No  chest pain: No  palpitations: No  blood in stool: No  constipation: No  vomiting: No  diarrhea: No  polydipsia: No  polyuria: No  difficulty urinating: No  hematuria: No  menstrual problem: No  dysuria: No  joint swelling: Yes  arthralgias: Yes  headaches: No  weakness: No  confusion: No  dysphoric mood: No    Active Problem List with Overview Notes    Diagnosis Date Noted    Iron deficiency anemia due to chronic blood loss 10/27/2020    Eczema 10/27/2020    Umbilical hernia 10/27/2020    Positive LIAM (antinuclear antibody) 10/20/2020    Myalgia 10/20/2020    Multiple joint pain 10/15/2020       Health Maintenance   Topic Date Due    TETANUS VACCINE  10/19/1992    Mammogram  10/20/2022    Lipid Panel  10/15/2025    Hepatitis C Screening  Completed       Past Medical History:   Diagnosis Date    Abnormal Pap smear of cervix        Past Surgical History:   Procedure Laterality Date    CERVICAL BIOPSY  W/ LOOP ELECTRODE EXCISION       SECTION      five    LEFT OOPHORECTOMY         family history includes Diabetes in her brother and mother.    Social History     Tobacco Use    Smoking status: Never Smoker    Smokeless  tobacco: Never Used   Substance Use Topics    Alcohol use: Never     Frequency: Monthly or less     Drinks per session: 1 or 2     Binge frequency: Never    Drug use: Never       Review of Systems   Constitutional: Negative for chills and fever.   HENT: Negative for congestion, hearing loss and sore throat.    Eyes: Negative for blurred vision and discharge.   Respiratory: Negative for cough, shortness of breath and wheezing.    Cardiovascular: Negative for chest pain and palpitations.   Gastrointestinal: Negative for blood in stool, constipation, diarrhea, melena, nausea and vomiting.   Genitourinary: Negative for dysuria and hematuria.   Musculoskeletal: Positive for joint pain. Negative for back pain, falls, myalgias and neck pain.   Skin: Negative for itching and rash.   Neurological: Negative for dizziness, weakness and headaches.   Endo/Heme/Allergies: Negative for polydipsia.        Outpatient Encounter Medications as of 10/27/2020   Medication Sig Dispense Refill    acetaminophen (TYLENOL) 325 MG tablet Take 325 mg by mouth every 6 (six) hours as needed for Pain.      predniSONE (DELTASONE) 2.5 MG tablet Take 4 tablets (10 mg total) by mouth once daily. 120 tablet 1     No facility-administered encounter medications on file as of 10/27/2020.         Review of patient's allergies indicates:  No Known Allergies        Physical Exam      Vital Signs  Temp: 98.1 °F (36.7 °C)  Temp src: Temporal  Pulse: 75  Resp: 16  SpO2: 98 %  BP: 130/78  BP Location: Left arm  Patient Position: Sitting  Height and Weight  Height: 5' (152.4 cm)  Weight: 59.4 kg (130 lb 15.3 oz)  BSA (Calculated - sq m): 1.59 sq meters  BMI (Calculated): 25.6  Weight in (lb) to have BMI = 25: 127.7]    Physical Exam  Vitals signs reviewed.   Constitutional:       General: She is not in acute distress.     Appearance: She is well-developed. She is not diaphoretic.   HENT:      Head: Normocephalic and atraumatic.      Right Ear: External ear  normal.      Left Ear: External ear normal.      Nose: Nose normal.   Eyes:      General:         Right eye: No discharge.         Left eye: No discharge.      Conjunctiva/sclera: Conjunctivae normal.   Neck:      Musculoskeletal: Normal range of motion.   Pulmonary:      Effort: Pulmonary effort is normal. No respiratory distress.   Musculoskeletal: Normal range of motion.         General: No swelling, tenderness or deformity.   Skin:     General: Skin is warm and dry.      Coloration: Skin is not pale.      Findings: No rash.   Neurological:      Mental Status: She is alert and oriented to person, place, and time.   Psychiatric:         Behavior: Behavior normal.         Thought Content: Thought content normal.         Judgment: Judgment normal.          Laboratory:  CBC:  Recent Labs   Lab Result Units 10/15/20  1502 10/19/20  1114   WBC K/uL 5.34 4.96   RBC M/uL 4.04 4.07   Hemoglobin g/dL 8.8* 9.0*   Hematocrit % 30.8* 31.2*   Platelets K/uL 321 363*   MCV fL 76* 77*   MCH pg 21.8* 22.1*   MCHC g/dL 28.6* 28.8*     CMP:  Recent Labs   Lab Result Units 10/15/20  1502   Glucose mg/dL 95   Calcium mg/dL 9.4   Albumin g/dL 4.4   Total Protein g/dL 8.2   Sodium mmol/L 143   Potassium mmol/L 4.0   CO2 mmol/L 25   Chloride mmol/L 106   BUN mg/dL 17   Alkaline Phosphatase U/L 56   ALT U/L 15   AST U/L 18   Total Bilirubin mg/dL 0.3     URINALYSIS:  Recent Labs   Lab Result Units 10/19/20  1016   Color, UA  Straw   Specific [place], UA  1.005   pH, UA  7.0   Protein, UA  Negative   Bacteria /hpf Rare   Nitrite, UA  Negative   Leukocytes, UA  Negative      LIPIDS:  Recent Labs   Lab Result Units 10/15/20  1502 10/27/20  1400   TSH uIU/mL  --  0.337*   HDL mg/dL 39*  --    Cholesterol mg/dL 155  --    Triglycerides mg/dL 129  --    LDL Cholesterol mg/dL 90.2  --    HDL/Cholesterol Ratio % 25.2  --    Non-HDL Cholesterol mg/dL 116  --    Total Cholesterol/HDL Ratio  4.0  --      TSH:  Recent Labs   Lab Result Units  10/27/20  1400   TSH uIU/mL 0.337*     A1C:  No results for input(s): HGBA1C in the last 2160 hours.    Radiology:      Assessment/Plan     Mckenna Peraza is a 45 y.o.female with:    Iron deficiency anemia due to chronic blood loss  Occult blood  To HO for IV Fe     Positive REAGAN (antinuclear antibody)  *per rheum   Will touchbase with rheum for duration of prednisone     Eczema  Pausing tx with Dupexient per Dr Singh     Umbilical hernia  Ref to gen surg       Orders Placed This Encounter   Procedures    Occult blood x 1, stool     Standing Status:   Future     Standing Expiration Date:   10/27/2021    Ambulatory referral/consult to Hematology / Oncology     Standing Status:   Future     Standing Expiration Date:   11/27/2021     Referral Priority:   Routine     Referral Type:   Consultation     Referral Reason:   Specialty Services Required     Requested Specialty:   Hematology and Oncology     Number of Visits Requested:   1    Ambulatory referral/consult to General Surgery     Standing Status:   Future     Standing Expiration Date:   11/27/2021     Referral Priority:   Routine     Referral Type:   Consultation     Referral Reason:   Specialty Services Required     Requested Specialty:   General Surgery     Number of Visits Requested:   1       Use of the [facility] Patient Portal discussed and encouraged during today's visit  -Continue current medications and maintain follow up with specialists.  Return to clinic in 3 months.  Future Appointments   Date Time Provider Department Center   10/28/2020  3:00 PM Len Celis MD [facility] HEM ONC [facility]   11/3/2020  2:40 PM Tila Estevez MD [facility] [place]   11/5/2020  1:00 PM Barrie Shelton MD [facility] GEN [facility] [place]   12/10/2020  1:00 PM Harshil Escamilla MD [facility] RHEUM [address]   1/26/2021  2:00 PM Faby Zambrano MD [facility] [place]   10/26/2021  3:00 PM [facility]IC MAMMO1 [facility] [place]       Faby Zambrano MD  10/27/2020 1:23 PM    Primary Care  Internal Medicine - [facility]

## 2020-10-28 ENCOUNTER — TELEPHONE (OUTPATIENT)
Dept: HEMATOLOGY/ONCOLOGY | Facility: CLINIC | Age: 45
End: 2020-10-28
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
## 2020-10-29 NOTE — PROGRESS NOTES
PATIENT: Mckenna Peraza  MRN: [id number]  DATE: 10/29/2020    Diagnosis:   1. Iron deficiency anemia due to chronic blood loss    2. Reactive thrombocytosis    3. Menorrhagia with regular cycle      Chief Complaint: Anemia    Subjective:    History of Present Illness: Ms. Peraza is a 45 y.o. female who presents for evaluation and management of iron deficiency anemia. She is referred by Dr. Zambrano.    Telemedicine visit:  The patient location is: home  The chief complaint leading to consultation is: iron deficiency anemia.    Visit type: audiovisual    Face to Face time with patient: 20 minutes  30 minutes of total time spent on the encounter, which includes face to face time and non-face to face time preparing to see the patient (eg, review of tests), Obtaining and/or reviewing separately obtained history, Documenting clinical information in the electronic or other health record, Independently interpreting results (not separately reported) and communicating results to the patient/family/caregiver, or Care coordination (not separately reported).     Each patient to whom he or she provides medical services by telemedicine is:  (1) informed of the relationship between the physician and patient and the respective role of any other health care provider with respect to management of the patient; and (2) notified that he or she may decline to receive medical services by telemedicine and may withdraw from such care at any time.    Notes: see below      - labs in October 2020 reveal a microcytic anemia with intermittent thrombocytosis.  - iron studies (10/19/20) reveal a decreased ferritin/iron/saturated iron.  - today, she complains of joint pain, fatigue.  - she endorses menorrhagia. She has seen gynecology recently for this.      Past medical, surgical, family, and social histories have been reviewed and updated below.    Past Medical History:   Past Medical History:   Diagnosis Date    Abnormal Pap smear of cervix         Past Surgical History:   Past Surgical History:   Procedure Laterality Date    CERVICAL BIOPSY  W/ LOOP ELECTRODE EXCISION       SECTION      five    LEFT OOPHORECTOMY         Family History:   Family History   Problem Relation Age of Onset    Diabetes Mother     Diabetes Brother     Breast cancer Neg Hx     Ovarian cancer Neg Hx     Colon cancer Neg Hx     Endometrial cancer Neg Hx        Social History:  reports that she has never smoked. She has never used smokeless tobacco. She reports that she does not drink alcohol or use drugs.    Allergies:  Review of patient's allergies indicates:  No Known Allergies    Medications:  Current Outpatient Medications   Medication Sig Dispense Refill    acetaminophen (TYLENOL) 325 MG tablet Take 325 mg by mouth every 6 (six) hours as needed for Pain.      predniSONE (DELTASONE) 2.5 MG tablet Take 4 tablets (10 mg total) by mouth once daily. 120 tablet 1     No current facility-administered medications for this visit.        Review of Systems   Constitutional: Positive for fatigue.   HENT: Negative for sore throat.    Eyes: Negative for visual disturbance.   Respiratory: Negative for cough and shortness of breath.    Cardiovascular: Negative for chest pain.   Gastrointestinal: Negative for abdominal pain, constipation, diarrhea, nausea and vomiting.   Genitourinary: Positive for menstrual problem. Negative for dysuria.   Musculoskeletal: Negative for back pain.   Skin: Negative for rash.   Neurological: Negative for headaches.   Hematological: Negative for adenopathy.   Psychiatric/Behavioral: The patient is not nervous/anxious.        ECOG Performance Status:   ECOG SCORE 1          Objective:      Vitals: There were no vitals filed for this visit.  BMI: There is no height or weight on file to calculate BMI.  Deferred since telemedicine visit    Physical Exam  Deferred since telemedicine visit    Laboratory Data:  Labs have been reviewed.    Lab Results    Component Value Date    WBC 4.96 10/19/2020    HGB 9.0 (L) 10/19/2020    HCT 31.2 (L) 10/19/2020    MCV 77 (L) 10/19/2020     (H) 10/19/2020           Imaging:    Assessment:       1. Iron deficiency anemia due to chronic blood loss    2. Reactive thrombocytosis    3. Menorrhagia with regular cycle           Plan:     1. Iron deficiency anemia due to chronic blood loss / reactive thrombocytosis  - I have reviewed her chart.  - labs in October 2020 reveal a microcytic anemia with intermittent thrombocytosis.  - iron studies (10/19/20) reveal a decreased ferritin/iron/saturated iron.  - given the severity of her iron deficiency anemia, I recommend iron sucrose weekly x 4 doses.  - I will also send a prescription for ferrous sulfate to her pharmacy  - return to clinic in 2 months with repeat labs.    2. Menorrhagia with regular cycles.  - the cause of her iron deficiency anemia.  - defer management to gynecology.    - return to clinic in 2 months with repeat labs.    Len Celis M.D.  Hematology/Oncology  [facility] - [address], [address]  [place] [address]  Phone: [phone]  Fax: [phone]

## 2020-10-30 ENCOUNTER — OFFICE VISIT (OUTPATIENT)
Dept: HEMATOLOGY/ONCOLOGY | Facility: CLINIC | Age: 45
End: 2020-10-30
Payer: COMMERCIAL

## 2020-10-30 ENCOUNTER — TELEPHONE (OUTPATIENT)
Dept: HEMATOLOGY/ONCOLOGY | Facility: CLINIC | Age: 45
End: 2020-10-30

## 2020-10-30 DIAGNOSIS — D75.838 REACTIVE THROMBOCYTOSIS: ICD-10-CM

## 2020-10-30 DIAGNOSIS — N92.0 MENORRHAGIA WITH REGULAR CYCLE: ICD-10-CM

## 2020-10-30 DIAGNOSIS — D50.0 IRON DEFICIENCY ANEMIA DUE TO CHRONIC BLOOD LOSS: Primary | ICD-10-CM

## 2020-10-30 PROCEDURE — 99204 PR OFFICE/OUTPT VISIT, NEW, LEVL IV, 45-59 MIN: ICD-10-PCS | Mod: 95,,, | Performed by: INTERNAL MEDICINE

## 2020-10-30 PROCEDURE — 99204 OFFICE O/P NEW MOD 45 MIN: CPT | Mod: 95,,, | Performed by: INTERNAL MEDICINE

## 2020-10-30 RX ORDER — SODIUM CHLORIDE 0.9 % (FLUSH) 0.9 %
10 SYRINGE (ML) INJECTION
Status: CANCELLED | OUTPATIENT
Start: 2020-11-24

## 2020-10-30 RX ORDER — FERROUS SULFATE 325(65) MG
325 TABLET, DELAYED RELEASE (ENTERIC COATED) ORAL 2 TIMES DAILY
Qty: 60 TABLET | Refills: 11 | Status: SHIPPED | OUTPATIENT
Start: 2020-10-30 | End: 2021-01-19 | Stop reason: SDUPTHER

## 2020-10-30 RX ORDER — HEPARIN 100 UNIT/ML
500 SYRINGE INTRAVENOUS
Status: CANCELLED | OUTPATIENT
Start: 2020-11-17

## 2020-10-30 RX ORDER — SODIUM CHLORIDE 0.9 % (FLUSH) 0.9 %
10 SYRINGE (ML) INJECTION
Status: CANCELLED | OUTPATIENT
Start: 2020-11-03

## 2020-10-30 RX ORDER — HEPARIN 100 UNIT/ML
500 SYRINGE INTRAVENOUS
Status: CANCELLED | OUTPATIENT
Start: 2020-11-10

## 2020-10-30 RX ORDER — HEPARIN 100 UNIT/ML
500 SYRINGE INTRAVENOUS
Status: CANCELLED | OUTPATIENT
Start: 2020-11-24

## 2020-10-30 RX ORDER — HEPARIN 100 UNIT/ML
500 SYRINGE INTRAVENOUS
Status: CANCELLED | OUTPATIENT
Start: 2020-11-03

## 2020-10-30 RX ORDER — SODIUM CHLORIDE 0.9 % (FLUSH) 0.9 %
10 SYRINGE (ML) INJECTION
Status: CANCELLED | OUTPATIENT
Start: 2020-11-17

## 2020-10-30 RX ORDER — SODIUM CHLORIDE 0.9 % (FLUSH) 0.9 %
10 SYRINGE (ML) INJECTION
Status: CANCELLED | OUTPATIENT
Start: 2020-11-10

## 2020-10-30 NOTE — TELEPHONE ENCOUNTER
Pt. Confirmed lab appt. On 12/28/20, at [place] location, and virtual visit with Dr. Celis  At 10am on 12/30/20.    ----- Message from Len Celis MD sent at 10/30/2020  9:50 AM CDT -----  1. Schedule iron sucrose x 4 doses at [facility].2. Schedule labs cbc, ferritin, iron/TIBC in 2 months. 3. Schedule f/u virtual appt day after labs.Thanks!

## 2020-10-30 NOTE — Clinical Note
1. Schedule iron sucrose x 4 doses at [facility] [place].  2. Schedule labs cbc, ferritin, iron/TIBC in 2 months.   3. Schedule f/u virtual appt day after labs.    Thanks!

## 2020-11-01 LAB
ANTI-PM/SCL AB: <20 UNITS
ANTI-SS-A 52 KD AB, IGG: 88 UNITS
EJ AB SER QL: NEGATIVE
ENA JO1 AB SER IA-ACNC: <20 UNITS
ENA SM+RNP AB SER IA-ACNC: <20 UNITS
FIBRILLARIN (U3 RNP): NEGATIVE
KU AB SER QL: NEGATIVE
MDA-5 (P140): <20 UNITS
MI2 AB SER QL: NEGATIVE
NXP-2 (P140): <20 UNITS
OJ AB SER QL: NEGATIVE
PL12 AB SER QL: NEGATIVE
PL7 AB SER QL: NEGATIVE
SRP AB SERPL QL: NEGATIVE
TIF1 GAMMA (P155/140): <20 UNITS
U2 SNRNP: NEGATIVE

## 2020-11-02 ENCOUNTER — PATIENT OUTREACH (OUTPATIENT)
Dept: ADMINISTRATIVE | Facility: OTHER | Age: 45
End: 2020-11-02

## 2020-11-02 LAB
HPV HR 12 DNA SPEC QL NAA+PROBE: NEGATIVE
HPV16 AG SPEC QL: NEGATIVE
HPV18 DNA SPEC QL NAA+PROBE: NEGATIVE

## 2020-11-02 NOTE — PROGRESS NOTES
Health Maintenance Due   Topic Date Due    TETANUS VACCINE  10/19/1992    Cervical Cancer Screening  10/19/1995     Updates were requested from care everywhere.  Chart was reviewed for overdue Proactive [facility] Encounters (LESLIE) topics (CRS, Breast Cancer Screening, Eye exam)  Health Maintenance has been updated.  LINKS immunization registry triggered.  Immunizations were reconciled.

## 2020-11-03 ENCOUNTER — OFFICE VISIT (OUTPATIENT)
Dept: OBSTETRICS AND GYNECOLOGY | Facility: CLINIC | Age: 45
End: 2020-11-03
Payer: COMMERCIAL

## 2020-11-03 VITALS
DIASTOLIC BLOOD PRESSURE: 64 MMHG | HEIGHT: 60 IN | BODY MASS INDEX: 25.29 KG/M2 | SYSTOLIC BLOOD PRESSURE: 110 MMHG | WEIGHT: 128.81 LBS

## 2020-11-03 DIAGNOSIS — N92.0 MENORRHAGIA WITH REGULAR CYCLE: Primary | ICD-10-CM

## 2020-11-03 DIAGNOSIS — Z30.014 ENCOUNTER FOR INITIAL PRESCRIPTION OF INTRAUTERINE CONTRACEPTIVE DEVICE (IUD): ICD-10-CM

## 2020-11-03 DIAGNOSIS — D50.9 IRON DEFICIENCY ANEMIA, UNSPECIFIED IRON DEFICIENCY ANEMIA TYPE: ICD-10-CM

## 2020-11-03 PROCEDURE — 99213 PR OFFICE/OUTPT VISIT, EST, LEVL III, 20-29 MIN: ICD-10-PCS | Mod: S$GLB,,, | Performed by: OBSTETRICS & GYNECOLOGY

## 2020-11-03 PROCEDURE — 3008F PR BODY MASS INDEX (BMI) DOCUMENTED: ICD-10-PCS | Mod: CPTII,S$GLB,, | Performed by: OBSTETRICS & GYNECOLOGY

## 2020-11-03 PROCEDURE — 99999 PR PBB SHADOW E&M-EST. PATIENT-LVL III: ICD-10-PCS | Mod: PBBFAC,,, | Performed by: OBSTETRICS & GYNECOLOGY

## 2020-11-03 PROCEDURE — 3008F BODY MASS INDEX DOCD: CPT | Mod: CPTII,S$GLB,, | Performed by: OBSTETRICS & GYNECOLOGY

## 2020-11-03 PROCEDURE — 99213 OFFICE O/P EST LOW 20 MIN: CPT | Mod: S$GLB,,, | Performed by: OBSTETRICS & GYNECOLOGY

## 2020-11-03 PROCEDURE — 99999 PR PBB SHADOW E&M-EST. PATIENT-LVL III: CPT | Mod: PBBFAC,,, | Performed by: OBSTETRICS & GYNECOLOGY

## 2020-11-03 NOTE — PROGRESS NOTES
Chief Complaint: U/S Results     HPI:      Mckenna Preaza is a 45 y.o.  who presents today for follow up of U/S results.  LMP: Patient's last menstrual period was 10/14/2020..  She reports receiving an IV iron infusion today and still has appointments pending with Rheumatology. No other new issues, problems, or complaints.     OB History        5    Para   5    Term   5            AB        Living   5       SAB        TAB        Ectopic        Multiple        Live Births               Obstetric Comments   14/R/4-5  Hx abnormal pap, s/p LEEP 2011  Hx chlamydia  CD x 5, BTL           Past Medical History:   Diagnosis Date    Abnormal Pap smear of cervix      Past Surgical History:   Procedure Laterality Date    CERVICAL BIOPSY  W/ LOOP ELECTRODE EXCISION       SECTION      five    LEFT OOPHORECTOMY       Social History     Socioeconomic History    Marital status:      Spouse name: Not on file    Number of children: Not on file    Years of education: Not on file    Highest education level: Not on file   Occupational History    Not on file   Social Needs    Financial resource strain: Somewhat hard    Food insecurity     Worry: Sometimes true     Inability: Sometimes true    Transportation needs     Medical: No     Non-medical: No   Tobacco Use    Smoking status: Never Smoker    Smokeless tobacco: Never Used   Substance and Sexual Activity    Alcohol use: Never     Frequency: Monthly or less     Drinks per session: 1 or 2     Binge frequency: Never    Drug use: Never    Sexual activity: Yes     Partners: Male     Birth control/protection: See Surgical Hx   Lifestyle    Physical activity     Days per week: 6 days     Minutes per session: 30 min    Stress: To some extent   Relationships    Social connections     Talks on phone: More than three times a week     Gets together: Once a week     Attends Christian service: Not on file     Active member of club or  organization: No     Attends meetings of clubs or organizations: Never     Relationship status:    Other Topics Concern    Not on file   Social History Narrative    Not on file     Family History   Problem Relation Age of Onset    Diabetes Mother     Diabetes Brother     Breast cancer Neg Hx     Ovarian cancer Neg Hx     Colon cancer Neg Hx     Endometrial cancer Neg Hx        Current Outpatient Medications:     ferrous sulfate 325 (65 FE) MG EC tablet, Take 1 tablet (325 mg total) by mouth 2 (two) times daily., Disp: 60 tablet, Rfl: 11    predniSONE (DELTASONE) 2.5 MG tablet, Take 4 tablets (10 mg total) by mouth once daily., Disp: 120 tablet, Rfl: 1    acetaminophen (TYLENOL) 325 MG tablet, Take 325 mg by mouth every 6 (six) hours as needed for Pain., Disp: , Rfl:   No current facility-administered medications for this visit.     ROS:     GENERAL: Denies unintentional weight gain or weight loss. Feeling well overall.   SKIN: Denies rash or lesions.   HEENT: Denies headaches, or vision changes.   ABDOMEN: Denies abdominal pain, constipation, diarrhea, nausea, vomiting, change in appetite.  URINARY: Denies frequency, dysuria, hematuria.  NEUROLOGIC: Denies syncope or weakness.   PSYCHIATRIC: Denies depression, anxiety or mood swings.    Physical Exam:      PHYSICAL EXAM:  /64   Ht 5' (1.524 m)   Wt 58.4 kg (128 lb 12.8 oz)   LMP 10/14/2020   BMI 25.15 kg/m²   Body mass index is 25.15 kg/m².     APPEARANCE: Well nourished, well developed, in no acute distress.  PSYCH: Appropriate mood and affect.  SKIN: No acne or hirsutism.    US Pelvis Comp with Transvag NON-OB (xpd  Narrative: EXAMINATION:  US PELVIS COMP WITH TRANSVAG NON-OB (XPD)    CLINICAL HISTORY:  Excessive and frequent menstruation with regular cycle    TECHNIQUE:  Transabdominal sonography of the pelvis was performed, followed by transvaginal sonography to better evaluate the uterus and  ovaries.    COMPARISON:  None.    FINDINGS:  Uterus:    Size: 8.9 x 4.5 x 5.0 cm    Masses: Anechoic area identified along the anterior lower uterine segment.  Possibilities would include scarring this patient with history of multiple C-sections.  This measures 1.1 x 0.9 x 1.2 cm.    Endometrium: Normal in this pre menopausal patient, measuring 6.9 mm.    Right ovary:    Size: 2.9 x 2.2 x 3.4 cm    Appearance: Anechoic area identified most consistent with cyst measuring 2.3 x 1.6 x 2.0 cm.    Vascular flow: Normal.  Resistive index 0.42    Left ovary:    Surgically absent    Free Fluid:    None.  Impression: Mildly prominent uterus with area of decreased echotexture in the lower uterine segment myometrium possibly representing scar.  See above.    Right ovarian cyst noted measuring 2.3 x 1.6 x 2.0 cm.    Unremarkable endometrium.    S/p left oophorectomy.    Electronically signed by: Garret Patel MD  Date:    10/27/2020  Time:    18:46    Lab Results   Component Value Date    TSH 0.337 (L) 10/27/2020     Assessment/Plan:     45 y.o.     Menorrhagia with regular cycle  -     Device Authorization Order    Iron deficiency anemia, unspecified iron deficiency anemia type    Encounter for initial prescription of intrauterine contraceptive device (IUD)  -     Device Authorization Order          Counselin.  AUB/Iron deficiency anemia:  U/S results reviewed with patient.  Scarring noted at section site.  Simple cyst on right ovary not requiring follow up.  2. Follow up T4.  3. Discussed options for hormonal regulation of cycle.  Patient desires Mirena IUD.  Device requested.  3.  Follow up with PCP for other health maintenance.  3.  RTC for IUD insertion once device approved.    Use of the [employer] Patient Portal discussed and encouraged during today's visit.

## 2020-11-05 ENCOUNTER — OFFICE VISIT (OUTPATIENT)
Dept: SURGERY | Facility: CLINIC | Age: 45
End: 2020-11-05
Payer: COMMERCIAL

## 2020-11-05 VITALS
WEIGHT: 130 LBS | HEART RATE: 89 BPM | BODY MASS INDEX: 25.39 KG/M2 | DIASTOLIC BLOOD PRESSURE: 57 MMHG | TEMPERATURE: 99 F | SYSTOLIC BLOOD PRESSURE: 106 MMHG

## 2020-11-05 DIAGNOSIS — R10.13 EPIGASTRIC PAIN: ICD-10-CM

## 2020-11-05 DIAGNOSIS — K42.9 UMBILICAL HERNIA WITHOUT OBSTRUCTION AND WITHOUT GANGRENE: ICD-10-CM

## 2020-11-05 PROCEDURE — 99999 PR PBB SHADOW E&M-EST. PATIENT-LVL III: CPT | Mod: PBBFAC,,, | Performed by: SURGERY

## 2020-11-05 PROCEDURE — 99204 OFFICE O/P NEW MOD 45 MIN: CPT | Mod: S$GLB,,, | Performed by: SURGERY

## 2020-11-05 PROCEDURE — 99204 PR OFFICE/OUTPT VISIT, NEW, LEVL IV, 45-59 MIN: ICD-10-PCS | Mod: S$GLB,,, | Performed by: SURGERY

## 2020-11-05 PROCEDURE — 99999 PR PBB SHADOW E&M-EST. PATIENT-LVL III: ICD-10-PCS | Mod: PBBFAC,,, | Performed by: SURGERY

## 2020-11-05 NOTE — LETTER
November 5, 2020      Faby Zambrano MD  [id number] [place] [id number]           [place] [address] [address] , [facility] [address]  [facility] [address]  Phone: [phone]  Fax: [phone]          Patient: Mckenna Peraza   MR Number: [id number]   YOB: 1975   Date of Visit: 11/5/2020       Dear Dr. Faby Zambrano:    Thank you for referring Mckenna Peraza to me for evaluation. Attached you will find relevant portions of my assessment and plan of care.    If you have questions, please do not hesitate to call me. I look forward to following Mckenna Peraza along with you.    Sincerely,    Barrie Shelton MD    Enclosure  CC:  No Recipients    If you would like to receive this communication electronically, please contact [email or web address] or [phone] to request more information on [employer] Link access.    For providers and/or their staff who would like to refer a patient to [facility], please contact us through our one-stop-shop provider referral line, [facility], at [phone].    If you feel you have received this communication in error or would no longer like to receive these types of communications, please e-mail [email or web address]

## 2020-11-05 NOTE — PROGRESS NOTES
[facility]  OUTPATIENT H&P    REASON FOR VISIT/CC: Abdominal pain    HPI: Mckenna Peraza is a 45 y.o. female abdominal pain at her midline above the umbilicus.  She notes a protuberance when she exercises.  She has mild pain and it is intermittent.  She denies N/V.  She is otherwise healthy.    I have reviewed the patient's chart including prior progress notes, procedures and testing.     ROS:   Review of Systems   Constitutional: Negative for activity change, chills, fatigue, fever and unexpected weight change.   HENT: Negative for facial swelling and trouble swallowing.    Eyes: Negative for visual disturbance.   Respiratory: Negative for cough, chest tightness, shortness of breath and wheezing.    Cardiovascular: Negative for chest pain, palpitations and leg swelling.   Gastrointestinal: Positive for abdominal pain. Negative for abdominal distention, anal bleeding, blood in stool, constipation, diarrhea, nausea and vomiting.   Endocrine: Negative for polydipsia and polyuria.   Genitourinary: Negative for dysuria and hematuria.   Musculoskeletal: Negative for arthralgias and myalgias.   Skin: Negative for color change, pallor and wound.   Allergic/Immunologic: Negative.    Neurological: Negative for syncope and weakness.   Hematological: Negative for adenopathy.   Psychiatric/Behavioral: Negative for agitation, behavioral problems and dysphoric mood.       PROBLEM LIST:  Patient Active Problem List   Diagnosis    Multiple joint pain    Positive REAGAN (antinuclear antibody)    Myalgia    Iron deficiency anemia due to chronic blood loss    Eczema    Umbilical hernia         HISTORY  Past Medical History:   Diagnosis Date    Abnormal Pap smear of cervix        Past Surgical History:   Procedure Laterality Date    CERVICAL BIOPSY  W/ LOOP ELECTRODE EXCISION       SECTION      five    LEFT OOPHORECTOMY         Social History     Tobacco Use    Smoking status: Never Smoker    Smokeless  tobacco: Never Used   Substance Use Topics    Alcohol use: Never     Frequency: Monthly or less     Drinks per session: 1 or 2     Binge frequency: Never    Drug use: Never       Family History   Problem Relation Age of Onset    Diabetes Mother     Diabetes Brother     Breast cancer Neg Hx     Ovarian cancer Neg Hx     Colon cancer Neg Hx     Endometrial cancer Neg Hx          MEDS:  Current Outpatient Medications on File Prior to Visit   Medication Sig Dispense Refill    acetaminophen (TYLENOL) 325 MG tablet Take 325 mg by mouth every 6 (six) hours as needed for Pain.      ferrous sulfate 325 (65 FE) MG EC tablet Take 1 tablet (325 mg total) by mouth 2 (two) times daily. 60 tablet 11    predniSONE (DELTASONE) 2.5 MG tablet Take 4 tablets (10 mg total) by mouth once daily. 120 tablet 1     No current facility-administered medications on file prior to visit.        ALLERGIES:  Review of patient's allergies indicates:  No Known Allergies      VITALS:  Vitals:    11/05/20 1313   BP: (!) 106/57   Pulse: 89   Temp: 98.7 °F (37.1 °C)         PHYSICAL EXAM:  Physical Exam  Constitutional:       General: She is not in acute distress.     Appearance: She is well-developed.   HENT:      Head: Normocephalic and atraumatic.   Eyes:      General: No scleral icterus.     Conjunctiva/sclera: Conjunctivae normal.      Pupils: Pupils are equal, round, and reactive to light.   Neck:      Musculoskeletal: Neck supple.   Cardiovascular:      Rate and Rhythm: Normal rate.   Pulmonary:      Effort: Pulmonary effort is normal.   Abdominal:      General: There is no distension.      Palpations: Abdomen is soft. There is no mass.      Tenderness: There is no abdominal tenderness. There is no guarding or rebound.      Hernia: A hernia is present.      Comments: Small non tender UH, midline feels like rectus diastasis   Musculoskeletal: Normal range of motion.   Skin:     General: Skin is warm and dry.      Findings: No rash.    Neurological:      Mental Status: She is alert and oriented to person, place, and time.   Psychiatric:         Behavior: Behavior normal.           LABS:  Lab Results   Component Value Date    WBC 4.96 10/19/2020    RBC 4.07 10/19/2020    HGB 9.0 (L) 10/19/2020    HCT 31.2 (L) 10/19/2020     (H) 10/19/2020     Lab Results   Component Value Date    GLU 95 10/15/2020     10/15/2020    K 4.0 10/15/2020     10/15/2020    CO2 25 10/15/2020    BUN 17 10/15/2020    CREATININE 0.60 10/15/2020    CALCIUM 9.4 10/15/2020     Lab Results   Component Value Date    ALT 15 10/15/2020    AST 18 10/15/2020    ALKPHOS 56 10/15/2020    BILITOT 0.3 10/15/2020     No results found for: MG, PHOS    STUDIES:  No hernia noted on pelvic U/S      ASSESSMENT & PLAN:  45 y.o. female with abdominal pain and likely rectus diastasis.  I do not feel any obvious hernia aside from the small UH.  We will get a CT and I will call her with the results.      Barrie Shelton M.D., F.A.C.S.  [id number] and General Surgery  [facility] [place] [facility] [place]

## 2020-11-05 NOTE — H&P (VIEW-ONLY)
[facility]  OUTPATIENT H&P    REASON FOR VISIT/CC: Abdominal pain    HPI: Mckenna Peraza is a 45 y.o. female abdominal pain at her midline above the umbilicus.  She notes a protuberance when she exercises.  She has mild pain and it is intermittent.  She denies N/V.  She is otherwise healthy.    I have reviewed the patient's chart including prior progress notes, procedures and testing.     ROS:   Review of Systems   Constitutional: Negative for activity change, chills, fatigue, fever and unexpected weight change.   HENT: Negative for facial swelling and trouble swallowing.    Eyes: Negative for visual disturbance.   Respiratory: Negative for cough, chest tightness, shortness of breath and wheezing.    Cardiovascular: Negative for chest pain, palpitations and leg swelling.   Gastrointestinal: Positive for abdominal pain. Negative for abdominal distention, anal bleeding, blood in stool, constipation, diarrhea, nausea and vomiting.   Endocrine: Negative for polydipsia and polyuria.   Genitourinary: Negative for dysuria and hematuria.   Musculoskeletal: Negative for arthralgias and myalgias.   Skin: Negative for color change, pallor and wound.   Allergic/Immunologic: Negative.    Neurological: Negative for syncope and weakness.   Hematological: Negative for adenopathy.   Psychiatric/Behavioral: Negative for agitation, behavioral problems and dysphoric mood.       PROBLEM LIST:  Patient Active Problem List   Diagnosis    Multiple joint pain    Positive REAGAN (antinuclear antibody)    Myalgia    Iron deficiency anemia due to chronic blood loss    Eczema    Umbilical hernia         HISTORY  Past Medical History:   Diagnosis Date    Abnormal Pap smear of cervix        Past Surgical History:   Procedure Laterality Date    CERVICAL BIOPSY  W/ LOOP ELECTRODE EXCISION       SECTION      five    LEFT OOPHORECTOMY         Social History     Tobacco Use    Smoking status: Never Smoker    Smokeless  tobacco: Never Used   Substance Use Topics    Alcohol use: Never     Frequency: Monthly or less     Drinks per session: 1 or 2     Binge frequency: Never    Drug use: Never       Family History   Problem Relation Age of Onset    Diabetes Mother     Diabetes Brother     Breast cancer Neg Hx     Ovarian cancer Neg Hx     Colon cancer Neg Hx     Endometrial cancer Neg Hx          MEDS:  Current Outpatient Medications on File Prior to Visit   Medication Sig Dispense Refill    acetaminophen (TYLENOL) 325 MG tablet Take 325 mg by mouth every 6 (six) hours as needed for Pain.      ferrous sulfate 325 (65 FE) MG EC tablet Take 1 tablet (325 mg total) by mouth 2 (two) times daily. 60 tablet 11    predniSONE (DELTASONE) 2.5 MG tablet Take 4 tablets (10 mg total) by mouth once daily. 120 tablet 1     No current facility-administered medications on file prior to visit.        ALLERGIES:  Review of patient's allergies indicates:  No Known Allergies      VITALS:  Vitals:    11/05/20 1313   BP: (!) 106/57   Pulse: 89   Temp: 98.7 °F (37.1 °C)         PHYSICAL EXAM:  Physical Exam  Constitutional:       General: She is not in acute distress.     Appearance: She is well-developed.   HENT:      Head: Normocephalic and atraumatic.   Eyes:      General: No scleral icterus.     Conjunctiva/sclera: Conjunctivae normal.      Pupils: Pupils are equal, round, and reactive to light.   Neck:      Musculoskeletal: Neck supple.   Cardiovascular:      Rate and Rhythm: Normal rate.   Pulmonary:      Effort: Pulmonary effort is normal.   Abdominal:      General: There is no distension.      Palpations: Abdomen is soft. There is no mass.      Tenderness: There is no abdominal tenderness. There is no guarding or rebound.      Hernia: A hernia is present.      Comments: Small non tender UH, midline feels like rectus diastasis   Musculoskeletal: Normal range of motion.   Skin:     General: Skin is warm and dry.      Findings: No rash.    Neurological:      Mental Status: She is alert and oriented to person, place, and time.   Psychiatric:         Behavior: Behavior normal.           LABS:  Lab Results   Component Value Date    WBC 4.96 10/19/2020    RBC 4.07 10/19/2020    HGB 9.0 (L) 10/19/2020    HCT 31.2 (L) 10/19/2020     (H) 10/19/2020     Lab Results   Component Value Date    GLU 95 10/15/2020     10/15/2020    K 4.0 10/15/2020     10/15/2020    CO2 25 10/15/2020    BUN 17 10/15/2020    CREATININE 0.60 10/15/2020    CALCIUM 9.4 10/15/2020     Lab Results   Component Value Date    ALT 15 10/15/2020    AST 18 10/15/2020    ALKPHOS 56 10/15/2020    BILITOT 0.3 10/15/2020     No results found for: MG, PHOS    STUDIES:  No hernia noted on pelvic U/S      ASSESSMENT & PLAN:  45 y.o. female with abdominal pain and likely rectus diastasis.  I do not feel any obvious hernia aside from the small UH.  We will get a CT and I will call her with the results.      Barrie Shelton M.D., F.A.C.S.  [id number] and General Surgery  [facility] [place] [facility] [place]

## 2020-11-12 ENCOUNTER — OFFICE VISIT (OUTPATIENT)
Dept: RHEUMATOLOGY | Facility: CLINIC | Age: 45
End: 2020-11-12
Payer: COMMERCIAL

## 2020-11-12 VITALS
WEIGHT: 130 LBS | HEART RATE: 68 BPM | SYSTOLIC BLOOD PRESSURE: 114 MMHG | DIASTOLIC BLOOD PRESSURE: 65 MMHG | HEIGHT: 60 IN | BODY MASS INDEX: 25.52 KG/M2

## 2020-11-12 DIAGNOSIS — M32.9 SYSTEMIC LUPUS ERYTHEMATOSUS, UNSPECIFIED SLE TYPE, UNSPECIFIED ORGAN INVOLVEMENT STATUS: Primary | ICD-10-CM

## 2020-11-12 DIAGNOSIS — K43.9 VENTRAL HERNIA WITHOUT OBSTRUCTION OR GANGRENE: Primary | ICD-10-CM

## 2020-11-12 DIAGNOSIS — M32.9 SLE (SYSTEMIC LUPUS ERYTHEMATOSUS RELATED SYNDROME): ICD-10-CM

## 2020-11-12 DIAGNOSIS — M25.50 MULTIPLE JOINT PAIN: ICD-10-CM

## 2020-11-12 PROCEDURE — 99999 PR PBB SHADOW E&M-EST. PATIENT-LVL IV: CPT | Mod: PBBFAC,,,

## 2020-11-12 PROCEDURE — 99999 PR PBB SHADOW E&M-EST. PATIENT-LVL IV: ICD-10-PCS | Mod: PBBFAC,,,

## 2020-11-12 PROCEDURE — 99214 PR OFFICE/OUTPT VISIT, EST, LEVL IV, 30-39 MIN: ICD-10-PCS | Mod: S$GLB,,,

## 2020-11-12 PROCEDURE — 99214 OFFICE O/P EST MOD 30 MIN: CPT | Mod: S$GLB,,,

## 2020-11-12 RX ORDER — HYDROXYCHLOROQUINE SULFATE 200 MG/1
300 TABLET, FILM COATED ORAL DAILY
Qty: 45 TABLET | Refills: 3 | Status: SHIPPED | OUTPATIENT
Start: 2020-11-12 | End: 2021-03-19 | Stop reason: SDUPTHER

## 2020-11-12 RX ORDER — PREDNISONE 5 MG/1
TABLET ORAL
Qty: 90 TABLET | Refills: 1 | Status: SHIPPED | OUTPATIENT
Start: 2020-11-12 | End: 2021-10-19 | Stop reason: SDUPTHER

## 2020-11-12 RX ORDER — PREDNISONE 1 MG/1
TABLET ORAL
Qty: 420 TABLET | Refills: 0 | Status: SHIPPED | OUTPATIENT
Start: 2020-11-12 | End: 2021-01-19 | Stop reason: SDUPTHER

## 2020-11-12 RX ORDER — SODIUM CHLORIDE 9 MG/ML
INJECTION, SOLUTION INTRAVENOUS CONTINUOUS
Status: CANCELLED | OUTPATIENT
Start: 2020-11-12

## 2020-11-12 ASSESSMENT — SYSTEMIC LUPUS ERYTHEMATOSUS DISEASE ACTIVITY INDEX (SLEDAI): TOTAL_SCORE: 4

## 2020-11-12 NOTE — ASSESSMENT & PLAN NOTE
Pt with initial presentation of diffuse arthralgias  - Found to have REAGAN 1:640 and +SSA antibody as well as positive anitphospholipid antibody  - SLE (2019 EULAR/ACR): 8 [Positive REAGAN, Synovitis and APS antibody]  - We feel patient's presentation is consistent with early SLE    Plan:  - Will start patient on HCQ 300mg daily  - Repeat ESR and CRP today  - Continue Prednisone with 1mg taper per 2 weeks  - Repeat labs in 3 months  - Instructed to follow-up with ophthalmologist  - Recommend 1000mg dietary calcium and 1000 IU Vit D daily  - Provided ACR handout on Lupus and HCQ

## 2020-11-12 NOTE — PROGRESS NOTES
Pre chart    Answers for HPI/ROS submitted by the patient on 11/10/2020   fever: No  eye redness: No  mouth sores: No  headaches: No  shortness of breath: No  chest pain: No  trouble swallowing: No  diarrhea: No  constipation: No  unexpected weight change: No  genital sore: No  dysuria: No  During the last 3 days, have you had a skin rash?: No  Bruises or bleeds easily: No  cough: No

## 2020-11-12 NOTE — PROGRESS NOTES
Subjective:       Patient ID: Mckenna Peraza is a 45 y.o. female.    Chief Complaint: Follow-up for diffuse arthralgias and positive REAGAN      Interval History:  Since last visit patient has been much improved.  On her last visit we administered Kenalog and started patient on 10mg Prednisone daily.  She states today she has much less pain and no swelling of her joints.  Still has some mild arthralgias in her wrist, shoulders and knees, but she states she can now walk much easier and get around with much less difficulty.  Still with significant morning stiffness but by the end of the night she feels much better.  Since our visit she has established with Hematology as she was severely iron deficient and she is receiving oral and IV iron.  Also has seen Ob/Gyn who plan to start IUD to help with bleeding from menses.      Initial HPI [10/19/2020]:  Pt is a 46yo F who presents after being referred from her PCP to be further worked up for her diffuse arthralgias.  Patient presented to her PCP on 10/15 and was having pain in her shoulders, wrists, ankles, knees.  She stated at that time that it had been going on for about two weeks before her visit.  On 10/13 she felt things got much worse and she was having swelling of her hands.  Denies any fever, N/V, abdominal pain or rashes.  She took some naproxen and it helped a little bit but not very much.  She reports stiffness that will last up to half the day.  Tylenol has provided minimal relief.  She states she feels handicapped and is usually very active.  Patient reports that she has had bad eczema for the last 4 years, however it has been well controlled since started Dupilumab which was started at the end of April.      FamHx: Denies any family history of autoimmune illnesses that she knows of    SocHx: Denies tobacco, alcohol, drugs. Occupation: Works as  at restaurant    Review of Systems   Constitutional: Negative for fever and unexpected weight change.   HENT:  Negative for mouth sores and trouble swallowing.    Eyes: Negative for redness.   Respiratory: Negative for cough and shortness of breath.    Cardiovascular: Negative for chest pain.   Gastrointestinal: Negative for constipation and diarrhea.   Genitourinary: Negative for dysuria and genital sores.   Skin: Negative for rash.   Neurological: Negative for headaches.   Hematological: Does not bruise/bleed easily.       No skin rashes, malar rash, photosensitivity   No telangiectasias   No calcinosis   No psoriasis   No patchy alopecia   No oral and nasal ulcers   No dry eyes and dry mouth   No pleurisy or any cardiopulmonary complaints   No dysphagia,diplopia and dysphonia and muscle weakness   No n/v/d/c   No acid reflux  No raynaud's  No digital ulcers   No cytopenias   No renal issues   No blood clots   No fever,chills,night sweats,weight loss and loss of appetite   No pregnancy losses/pre term deliveries /pregnancy complications   No new onset headaches   No recurrent conjunctivitis or uveitis or scleritis or episcleritis   No chronic or bloody diarrhea with no u colitis or crohn's /inflammatory bowel disease   No vaginal or urethral  d/c/STDs/no ulcers      Objective:   /65   Pulse 68   Ht 5' (1.524 m)   Wt 59 kg (130 lb)   LMP 10/14/2020   BMI 25.39 kg/m²      Physical Exam   Nursing note and vitals reviewed.  Constitutional: She is oriented to person, place, and time and well-developed, well-nourished, and in no distress. No distress.   HENT:   Head: Normocephalic and atraumatic.   Mouth/Throat: Oropharynx is clear and moist. No oropharyngeal exudate.   Eyes: Conjunctivae and EOM are normal. Pupils are equal, round, and reactive to light. Right eye exhibits no discharge. Left eye exhibits no discharge. No scleral icterus.   Neck: Normal range of motion. Neck supple.   Cardiovascular: Normal rate, regular rhythm, normal heart sounds and intact distal pulses.    Pulmonary/Chest: Effort normal and breath  sounds normal. No respiratory distress. She has no wheezes. She has no rales.   Abdominal: Soft. She exhibits no distension. There is no abdominal tenderness. There is no rebound and no guarding.   Neurological: She is alert and oriented to person, place, and time.   Skin: Skin is warm and dry. No rash noted. She is not diaphoretic. No erythema.     Psychiatric: Mood and affect normal.   Musculoskeletal: Normal range of motion. No tenderness.               10/19/2020 11/12/2020   PEREZ-28 (ESR) -- 3.26 (Moderate disease activity)   PEREZ-28 (CRP) -- 2.63 (Low disease activity)   Tender (PEREZ-28) 21 / 28  0 / 28    Swollen (PEREZ-28) 19 / 28  3 / 28    Provider Global -- --   Patient Global -- 30 mm   ESR 29 mm/hr 29 mm/hr   CRP 7.4 mg/L 7.4 mg/L        Assessment:       1. Systemic lupus erythematosus, unspecified SLE type, unspecified organ involvement status    2. Multiple joint pain    3. SLE (systemic lupus erythematosus related syndrome)            Plan:       Problem List Items Addressed This Visit        Immunology/Multi System    Systemic lupus erythematosus - Primary     Pt with initial presentation of diffuse arthralgias  - Found to have REAGAN 1:640 and +SSA antibody as well as positive anitphospholipid antibody  - SLE (2019 EULAR/ACR): 8 [Positive REAGAN, Synovitis and APS antibody]  - We feel patient's presentation is consistent with early SLE    Plan:  - Will start patient on HCQ 300mg daily  - Repeat ESR and CRP today  - Continue Prednisone with 1mg taper per 2 weeks  - Repeat labs in 3 months  - Instructed to follow-up with ophthalmologist  - Recommend 1000mg dietary calcium and 1000 IU Vit D daily  - Provided ACR handout on Lupus and HCQ           Relevant Medications    hydrOXYchloroQUINE (PLAQUENIL) 200 mg tablet    Other Relevant Orders    C-Reactive Protein    Sedimentation rate       Orthopedic    Multiple joint pain      Other Visit Diagnoses     SLE (systemic lupus erythematosus related syndrome)         Relevant Medications    predniSONE (DELTASONE) 5 MG tablet    predniSONE (DELTASONE) 1 MG tablet    Other Relevant Orders    C-Reactive Protein    Sedimentation rate    Anti-DNA Ab, Double-Stranded    C3 Complement    C4 Complement    CBC Auto Differential    Comprehensive Metabolic Panel    Protein/Creatinine Ratio, Urine    Urinalysis    Sedimentation rate    C-Reactive Protein                RTC in 3 months    Patient discussed with Dr. Jordan Escamilla MD, PGY-4  [facility]

## 2020-11-13 ENCOUNTER — ANESTHESIA EVENT (OUTPATIENT)
Dept: SURGERY | Facility: HOSPITAL | Age: 45
End: 2020-11-13
Payer: COMMERCIAL

## 2020-11-14 ENCOUNTER — PATIENT MESSAGE (OUTPATIENT)
Dept: RHEUMATOLOGY | Facility: CLINIC | Age: 45
End: 2020-11-14

## 2020-11-16 ENCOUNTER — HOSPITAL ENCOUNTER (OUTPATIENT)
Dept: PREADMISSION TESTING | Facility: HOSPITAL | Age: 45
Discharge: HOME | End: 2020-11-16
Attending: SURGERY
Payer: COMMERCIAL

## 2020-11-16 VITALS
HEART RATE: 73 BPM | WEIGHT: 130 LBS | DIASTOLIC BLOOD PRESSURE: 57 MMHG | BODY MASS INDEX: 25.52 KG/M2 | OXYGEN SATURATION: 100 % | SYSTOLIC BLOOD PRESSURE: 115 MMHG | RESPIRATION RATE: 18 BRPM | HEIGHT: 60 IN

## 2020-11-16 DIAGNOSIS — M25.50 MULTIPLE JOINT PAIN: ICD-10-CM

## 2020-11-16 DIAGNOSIS — K42.9 UMBILICAL HERNIA WITHOUT OBSTRUCTION AND WITHOUT GANGRENE: Primary | ICD-10-CM

## 2020-11-16 LAB — SARS-COV-2 RDRP RESP QL NAA+PROBE: NEGATIVE

## 2020-11-16 PROCEDURE — U0002 COVID-19 LAB TEST NON-CDC: HCPCS

## 2020-11-16 RX ORDER — LIDOCAINE HYDROCHLORIDE 10 MG/ML
1 INJECTION, SOLUTION EPIDURAL; INFILTRATION; INTRACAUDAL; PERINEURAL ONCE
Status: CANCELLED | OUTPATIENT
Start: 2020-11-16 | End: 2020-11-16

## 2020-11-16 RX ORDER — SODIUM CHLORIDE, SODIUM LACTATE, POTASSIUM CHLORIDE, CALCIUM CHLORIDE 600; 310; 30; 20 MG/100ML; MG/100ML; MG/100ML; MG/100ML
INJECTION, SOLUTION INTRAVENOUS CONTINUOUS
Status: CANCELLED | OUTPATIENT
Start: 2020-11-16

## 2020-11-16 RX ORDER — SCOLOPAMINE TRANSDERMAL SYSTEM 1 MG/1
1 PATCH, EXTENDED RELEASE TRANSDERMAL
Status: CANCELLED | OUTPATIENT
Start: 2020-11-16

## 2020-11-16 NOTE — DISCHARGE INSTRUCTIONS
Your surgery is scheduled for 11/17/20.    Please report to Front Lobby on the 1st Floor at 5:30 a.m.    THIS TIME IS SUBJECT TO CHANGE.  YOU WILL RECEIVE A PHONE CALL THE DAY BEFORE SURGERY BY 3:30 PM TO CONFIRM YOUR TIME OF ARRIVAL.  IF YOU HAVE NOT RECEIVED A PHONE CALL BY 3:30 PM THE DAY BEFORE YOUR SURGERY PLEASE CALL [phone]     INSTRUCTIONS IMPORTANT!!!  ¨ Do not eat or drink after 12 midnight-including water. OK to brush teeth, no   gum, candy or mints!          ____  Proceed to [facility] on 11/16/20 for additional testing.        ____  Do not wear makeup, including mascara.  ____  No powder, lotions or creams to surgical area.  ____  Please remove all jewelry, including piercings and leave at home.  ____  No money or valuables needed. Please leave at home.  ____  Please bring any documents given by your doctor.  ____  If going home the same day, arrange for a ride home. You will not be able to             drive if Anesthesia was used.  ____  Wear loose fitting clothing. Allow for dressings, bandages.  ____  Stop Aspirin, Ibuprofen, Motrin and Aleve at least 3-5 days before surgery, unless otherwise instructed by your doctor, or the nurse.   You MAY use Tylenol/acetaminophen until day of surgery.  ____  Wash the surgical area with Hibiclens the night before surgery, and again the             morning of surgery.  Be sure to rinse hibiclens off completely (if instructed by   nurse).  ____  If you take diabetic medication, do not take am of surgery unless instructed by Doctor.  ____  Call MD for temperature above 101 degrees or any other signs of infection such as Urinary (bladder) infection, Upper respiratory infection, skin boils, etc.  ____ Stop taking any Fish Oil supplement or any Vitamins that contain Vitamin E at least 5 days prior to surgery.  ____ Do Not wear your contact lenses the day of your procedure.  You may wear your glasses.      ____Do not shave surgical site for 3 days  prior to surgery.  ____ Practice Good hand washing before, during, and after procedure.      I have read or had read and explained to me, and understand the above information.  Additional comments or instructions:  For additional questions call [phone]      ANESTHESIA SIDE EFFECTS  -For the first 24 hours after surgery:  Do not drive, use heavy equipment, make important decisions, or drink alcohol  -It is normal to feel sleepy for several hours.  Rest until you are more awake.  -Have someone stay with you, if needed.  They can watch for problems and help keep you safe.  -Some possible post anesthesia side effects include: nausea and vomiting, sore throat and hoarseness, sleepiness, and dizziness.        Pre-Op Bathing Instructions    Before surgery, you can play an important role in your own health.    Because skin is not sterile, we need to be sure that your skin is as free of germs as possible. By following the instructions below, you can reduce the number of germs on your skin before surgery.    IMPORTANT: You will need to shower with a special soap called Hibiclens*, available at any pharmacy.  If you are allergic to Chlorhexidine (the antiseptic in Hibiclens), use an antibacterial soap such as Dial Soap for your preoperative shower.  You will shower with Hibiclens both the night before your surgery and the morning of your surgery.  Do not use Hibiclens on the head, face or genitals to avoid injury to those areas.    STEP #1: THE NIGHT BEFORE YOUR SURGERY     1. Do not shave the area of your body where your surgery will be performed.  2. Shower and wash your hair and body as usual with your normal soap and shampoo.  3. Rinse your hair and body thoroughly after you shower to remove all soap residue.  4. With your hand, apply one packet of Hibiclens soap to the surgical site.   5. Wash the site gently for five (5) minutes. Do not scrub your skin too hard.   6. Do not wash with your regular soap after Hibiclens is  used.  7. Rinse your body thoroughly.  8. Pat yourself dry with a clean, soft towel.  9. Do not use lotion, cream, or powder.  10. Wear clean clothes.    STEP #2: THE MORNING OF YOUR SURGERY     1. Repeat Step #1.    * Not to be used by people allergic to Chlorhexidine.

## 2020-11-16 NOTE — ANESTHESIA PREPROCEDURE EVALUATION
2020  Mckenna Peraza is a 45 y.o., female  scheduled for ventral hernia repair on 2020.    Patient with SLE.  Started 10 mg prednisone daily on 10/19/2020.  Began taper , 9 mg daily at this time.  Consider stress dose steroids.      Past Medical History:   Diagnosis Date    Abnormal Pap smear of cervix      Past Surgical History:   Procedure Laterality Date    CERVICAL BIOPSY  W/ LOOP ELECTRODE EXCISION       SECTION      five    LEFT OOPHORECTOMY         Anesthesia Evaluation    I have reviewed the Patient Summary Reports.    I have reviewed the Nursing Notes.    I have reviewed the Medications.   Steroids Taken In Past Year: Prednisone    Review of Systems  Anesthesia Hx:  No problems with previous Anesthesia  Denies Family Hx of Anesthesia complications.    Social:  Non-Smoker, No Alcohol Use    Hematology/Oncology:  Hematology Normal        Cardiovascular:  Cardiovascular Normal Exercise tolerance: good   Denies Angina.        Pulmonary:  Pulmonary Normal  Denies Shortness of breath.    Renal/:  Renal/ Normal     Hepatic/GI:  Hepatic/GI Normal    Musculoskeletal:  Rheumatic Disease, Lupus    Neurological:  Neurology Normal    Endocrine:  Endocrine Normal        Physical Exam  General:  Well nourished    Airway/Jaw/Neck:  Airway Findings: Mouth Opening: Normal Tongue: Normal  General Airway Assessment: Adult  Mallampati: II  Jaw/Neck Findings:  Neck ROM: Normal ROM      Dental:  Dental Findings: In tact   Chest/Lungs:  Chest/Lungs Findings: Clear to auscultation, Normal Respiratory Rate     Heart/Vascular:  Heart Findings: Rate: Normal  Rhythm: Regular Rhythm  Sounds: Normal        Mental Status:  Mental Status Findings:  Alert and Oriented, Cooperative         Anesthesia Plan  Type of Anesthesia, risks & benefits discussed:  Anesthesia Type:  general  Patient's  Preference:   Intra-op Monitoring Plan: standard ASA monitors  Intra-op Monitoring Plan Comments:   Post Op Pain Control Plan: multimodal analgesia  Post Op Pain Control Plan Comments:   Induction:   IV  Beta Blocker:  Patient is not currently on a Beta-Blocker (No further documentation required).       Informed Consent: Patient understands risks and agrees with Anesthesia plan.  Questions answered. Anesthesia consent signed with patient.  ASA Score: 2     Day of Surgery Review of History & Physical: I have interviewed and examined the patient. I have reviewed the patient's H&P dated:  There are no significant changes.  H&P update referred to the surgeon.     Anesthesia Plan Notes:           Ready For Surgery From Anesthesia Perspective.

## 2020-11-17 ENCOUNTER — HOSPITAL ENCOUNTER (OUTPATIENT)
Facility: HOSPITAL | Age: 45
Discharge: HOME | End: 2020-11-17
Attending: SURGERY | Admitting: SURGERY
Payer: COMMERCIAL

## 2020-11-17 ENCOUNTER — ANESTHESIA (OUTPATIENT)
Dept: SURGERY | Facility: HOSPITAL | Age: 45
End: 2020-11-17
Payer: COMMERCIAL

## 2020-11-17 VITALS
WEIGHT: 130 LBS | HEART RATE: 69 BPM | OXYGEN SATURATION: 99 % | BODY MASS INDEX: 25.52 KG/M2 | HEIGHT: 60 IN | DIASTOLIC BLOOD PRESSURE: 79 MMHG | SYSTOLIC BLOOD PRESSURE: 130 MMHG | RESPIRATION RATE: 17 BRPM | TEMPERATURE: 98 F

## 2020-11-17 DIAGNOSIS — K43.9 VENTRAL HERNIA WITHOUT OBSTRUCTION OR GANGRENE: Primary | ICD-10-CM

## 2020-11-17 PROCEDURE — 71000016 HC POSTOP RECOV ADDL HR: Performed by: SURGERY

## 2020-11-17 PROCEDURE — 63600175 PHARM REV CODE 636 W HCPCS: Performed by: STUDENT IN AN ORGANIZED HEALTH CARE EDUCATION/TRAINING PROGRAM

## 2020-11-17 PROCEDURE — 71000033 HC RECOVERY, INTIAL HOUR: Performed by: SURGERY

## 2020-11-17 PROCEDURE — 63600175 PHARM REV CODE 636 W HCPCS

## 2020-11-17 PROCEDURE — 63600175 PHARM REV CODE 636 W HCPCS: Performed by: NURSE PRACTITIONER

## 2020-11-17 PROCEDURE — 37000008 HC ANESTHESIA 1ST 15 MINUTES: Performed by: SURGERY

## 2020-11-17 PROCEDURE — 25000003 PHARM REV CODE 250: Performed by: STUDENT IN AN ORGANIZED HEALTH CARE EDUCATION/TRAINING PROGRAM

## 2020-11-17 PROCEDURE — 49561 PR REPAIR INCISIONAL HERNIA,STRANG: ICD-10-PCS | Mod: ,,, | Performed by: SURGERY

## 2020-11-17 PROCEDURE — 37000009 HC ANESTHESIA EA ADD 15 MINS: Performed by: SURGERY

## 2020-11-17 PROCEDURE — 36000706: Performed by: SURGERY

## 2020-11-17 PROCEDURE — 25000003 PHARM REV CODE 250: Performed by: SURGERY

## 2020-11-17 PROCEDURE — 25000003 PHARM REV CODE 250: Performed by: NURSE PRACTITIONER

## 2020-11-17 PROCEDURE — 71000015 HC POSTOP RECOV 1ST HR: Performed by: SURGERY

## 2020-11-17 PROCEDURE — 36000707: Performed by: SURGERY

## 2020-11-17 PROCEDURE — 49561 PR REPAIR INCISIONAL HERNIA,STRANG: CPT | Mod: ,,, | Performed by: SURGERY

## 2020-11-17 RX ORDER — LIDOCAINE HYDROCHLORIDE 10 MG/ML
1 INJECTION, SOLUTION EPIDURAL; INFILTRATION; INTRACAUDAL; PERINEURAL ONCE
Status: DISCONTINUED | OUTPATIENT
Start: 2020-11-17 | End: 2020-11-17 | Stop reason: HOSPADM

## 2020-11-17 RX ORDER — SODIUM CHLORIDE 0.9 % (FLUSH) 0.9 %
10 SYRINGE (ML) INJECTION
Status: DISCONTINUED | OUTPATIENT
Start: 2020-11-17 | End: 2020-11-17 | Stop reason: HOSPADM

## 2020-11-17 RX ORDER — PROPOFOL 10 MG/ML
VIAL (ML) INTRAVENOUS
Status: DISCONTINUED | OUTPATIENT
Start: 2020-11-17 | End: 2020-11-17

## 2020-11-17 RX ORDER — HYDROMORPHONE HYDROCHLORIDE 2 MG/ML
INJECTION, SOLUTION INTRAMUSCULAR; INTRAVENOUS; SUBCUTANEOUS
Status: COMPLETED
Start: 2020-11-17 | End: 2020-11-17

## 2020-11-17 RX ORDER — MIDAZOLAM HYDROCHLORIDE 1 MG/ML
INJECTION INTRAMUSCULAR; INTRAVENOUS
Status: DISCONTINUED | OUTPATIENT
Start: 2020-11-17 | End: 2020-11-17

## 2020-11-17 RX ORDER — LIDOCAINE HYDROCHLORIDE 10 MG/ML
INJECTION INFILTRATION; PERINEURAL
Status: DISCONTINUED | OUTPATIENT
Start: 2020-11-17 | End: 2020-11-17 | Stop reason: HOSPADM

## 2020-11-17 RX ORDER — BUPIVACAINE HYDROCHLORIDE 5 MG/ML
INJECTION, SOLUTION PERINEURAL
Status: DISCONTINUED | OUTPATIENT
Start: 2020-11-17 | End: 2020-11-17 | Stop reason: HOSPADM

## 2020-11-17 RX ORDER — OXYCODONE AND ACETAMINOPHEN 5; 325 MG/1; MG/1
1 TABLET ORAL EVERY 4 HOURS PRN
Status: DISCONTINUED | OUTPATIENT
Start: 2020-11-17 | End: 2020-11-17 | Stop reason: HOSPADM

## 2020-11-17 RX ORDER — HYDROMORPHONE HYDROCHLORIDE 2 MG/ML
0.5 INJECTION, SOLUTION INTRAMUSCULAR; INTRAVENOUS; SUBCUTANEOUS EVERY 5 MIN PRN
Status: DISCONTINUED | OUTPATIENT
Start: 2020-11-17 | End: 2020-11-17 | Stop reason: HOSPADM

## 2020-11-17 RX ORDER — ROCURONIUM BROMIDE 10 MG/ML
INJECTION, SOLUTION INTRAVENOUS
Status: DISCONTINUED | OUTPATIENT
Start: 2020-11-17 | End: 2020-11-17

## 2020-11-17 RX ORDER — SODIUM CHLORIDE, SODIUM LACTATE, POTASSIUM CHLORIDE, CALCIUM CHLORIDE 600; 310; 30; 20 MG/100ML; MG/100ML; MG/100ML; MG/100ML
INJECTION, SOLUTION INTRAVENOUS CONTINUOUS
Status: DISCONTINUED | OUTPATIENT
Start: 2020-11-17 | End: 2020-11-17 | Stop reason: HOSPADM

## 2020-11-17 RX ORDER — SODIUM CHLORIDE 9 MG/ML
INJECTION, SOLUTION INTRAVENOUS CONTINUOUS
Status: DISCONTINUED | OUTPATIENT
Start: 2020-11-17 | End: 2020-11-17 | Stop reason: HOSPADM

## 2020-11-17 RX ORDER — HYDROCODONE BITARTRATE AND ACETAMINOPHEN 5; 325 MG/1; MG/1
1 TABLET ORAL
Status: DISCONTINUED | OUTPATIENT
Start: 2020-11-17 | End: 2020-11-17 | Stop reason: HOSPADM

## 2020-11-17 RX ORDER — SCOLOPAMINE TRANSDERMAL SYSTEM 1 MG/1
1 PATCH, EXTENDED RELEASE TRANSDERMAL
Status: DISCONTINUED | OUTPATIENT
Start: 2020-11-17 | End: 2020-11-17 | Stop reason: HOSPADM

## 2020-11-17 RX ORDER — ONDANSETRON 2 MG/ML
4 INJECTION INTRAMUSCULAR; INTRAVENOUS ONCE
Status: COMPLETED | OUTPATIENT
Start: 2020-11-17 | End: 2020-11-17

## 2020-11-17 RX ORDER — SODIUM CHLORIDE, SODIUM LACTATE, POTASSIUM CHLORIDE, CALCIUM CHLORIDE 600; 310; 30; 20 MG/100ML; MG/100ML; MG/100ML; MG/100ML
INJECTION, SOLUTION INTRAVENOUS CONTINUOUS PRN
Status: DISCONTINUED | OUTPATIENT
Start: 2020-11-17 | End: 2020-11-17

## 2020-11-17 RX ORDER — OXYCODONE AND ACETAMINOPHEN 5; 325 MG/1; MG/1
1 TABLET ORAL EVERY 6 HOURS PRN
Qty: 12 TABLET | Refills: 0 | Status: SHIPPED | OUTPATIENT
Start: 2020-11-17 | End: 2020-11-24

## 2020-11-17 RX ORDER — SUCCINYLCHOLINE CHLORIDE 20 MG/ML
INJECTION INTRAMUSCULAR; INTRAVENOUS
Status: DISCONTINUED | OUTPATIENT
Start: 2020-11-17 | End: 2020-11-17

## 2020-11-17 RX ORDER — CEFAZOLIN SODIUM 2 G/50ML
2 SOLUTION INTRAVENOUS
Status: DISCONTINUED | OUTPATIENT
Start: 2020-11-17 | End: 2020-11-17 | Stop reason: HOSPADM

## 2020-11-17 RX ORDER — NEOSTIGMINE METHYLSULFATE 1 MG/ML
INJECTION, SOLUTION INTRAVENOUS
Status: DISCONTINUED | OUTPATIENT
Start: 2020-11-17 | End: 2020-11-17

## 2020-11-17 RX ORDER — ONDANSETRON 2 MG/ML
INJECTION INTRAMUSCULAR; INTRAVENOUS
Status: DISCONTINUED | OUTPATIENT
Start: 2020-11-17 | End: 2020-11-17

## 2020-11-17 RX ORDER — LIDOCAINE HCL/PF 100 MG/5ML
SYRINGE (ML) INTRAVENOUS
Status: DISCONTINUED | OUTPATIENT
Start: 2020-11-17 | End: 2020-11-17

## 2020-11-17 RX ORDER — DEXAMETHASONE SODIUM PHOSPHATE 4 MG/ML
INJECTION, SOLUTION INTRA-ARTICULAR; INTRALESIONAL; INTRAMUSCULAR; INTRAVENOUS; SOFT TISSUE
Status: DISCONTINUED | OUTPATIENT
Start: 2020-11-17 | End: 2020-11-17

## 2020-11-17 RX ADMIN — HYDROMORPHONE HYDROCHLORIDE 0.5 MG: 2 INJECTION, SOLUTION INTRAMUSCULAR; INTRAVENOUS; SUBCUTANEOUS at 08:11

## 2020-11-17 RX ADMIN — SUGAMMADEX 200 MG: 100 INJECTION, SOLUTION INTRAVENOUS at 08:11

## 2020-11-17 RX ADMIN — SCOPALAMINE 1 PATCH: 1 PATCH, EXTENDED RELEASE TRANSDERMAL at 06:11

## 2020-11-17 RX ADMIN — SODIUM CHLORIDE, SODIUM LACTATE, POTASSIUM CHLORIDE, AND CALCIUM CHLORIDE: .6; .31; .03; .02 INJECTION, SOLUTION INTRAVENOUS at 06:11

## 2020-11-17 RX ADMIN — ONDANSETRON 4 MG: 2 INJECTION, SOLUTION INTRAMUSCULAR; INTRAVENOUS at 08:11

## 2020-11-17 RX ADMIN — LIDOCAINE HYDROCHLORIDE 100 MG: 20 INJECTION, SOLUTION INTRAVENOUS at 07:11

## 2020-11-17 RX ADMIN — DEXAMETHASONE SODIUM PHOSPHATE 4 MG: 4 INJECTION, SOLUTION INTRA-ARTICULAR; INTRALESIONAL; INTRAMUSCULAR; INTRAVENOUS; SOFT TISSUE at 07:11

## 2020-11-17 RX ADMIN — NEOSTIGMINE METHYLSULFATE 4 MG: 1 INJECTION INTRAVENOUS at 08:11

## 2020-11-17 RX ADMIN — MIDAZOLAM HYDROCHLORIDE 2 MG: 1 INJECTION, SOLUTION INTRAMUSCULAR; INTRAVENOUS at 07:11

## 2020-11-17 RX ADMIN — PROPOFOL 150 MG: 10 INJECTION, EMULSION INTRAVENOUS at 07:11

## 2020-11-17 RX ADMIN — SODIUM CHLORIDE, SODIUM LACTATE, POTASSIUM CHLORIDE, AND CALCIUM CHLORIDE: .6; .31; .03; .02 INJECTION, SOLUTION INTRAVENOUS at 07:11

## 2020-11-17 RX ADMIN — GLYCOPYRROLATE 0.6 MG: 0.2 INJECTION, SOLUTION INTRAMUSCULAR; INTRAVITREAL at 08:11

## 2020-11-17 RX ADMIN — DEXTROSE 2 G: 50 INJECTION, SOLUTION INTRAVENOUS at 07:11

## 2020-11-17 RX ADMIN — ONDANSETRON 4 MG: 2 INJECTION INTRAMUSCULAR; INTRAVENOUS at 10:11

## 2020-11-17 RX ADMIN — HYDROCODONE BITARTRATE AND ACETAMINOPHEN 1 TABLET: 5; 325 TABLET ORAL at 08:11

## 2020-11-17 RX ADMIN — SUCCINYLCHOLINE CHLORIDE 120 MG: 20 INJECTION, SOLUTION INTRAMUSCULAR; INTRAVENOUS at 07:11

## 2020-11-17 RX ADMIN — ROCURONIUM BROMIDE 30 MG: 10 INJECTION, SOLUTION INTRAVENOUS at 07:11

## 2020-11-17 NOTE — BRIEF OP NOTE
[facility]  Brief Operative Note    Surgery Date: 11/17/2020     Surgeon(s) and Role:     * Barrie Shelton MD - Primary    Assisting Surgeon: None    Pre-op Diagnosis:  Ventral hernia without obstruction or gangrene [K43.9]    Post-op Diagnosis:  Post-Op Diagnosis Codes:     * Ventral hernia without obstruction or gangrene [K43.9]    Procedure(s) (LRB):  REPAIR, HERNIA, INCISIONAL OR VENTRAL, WITHOUT HISTORY OF PRIOR REPAIR (N/A)    Anesthesia: General    Description of the findings of the procedure(s): see op note    Estimated Blood Loss: minimal  Specimens: none    Discharge Note    OUTCOME: Patient tolerated treatment/procedure well without complication and is now ready for discharge.    DISPOSITION: Home or Self Care    FINAL DIAGNOSIS:  <principal problem not specified>    FOLLOWUP: In clinic    DISCHARGE INSTRUCTIONS:  No discharge procedures on file.

## 2020-11-17 NOTE — TRANSFER OF CARE
Anesthesia Transfer of Care Note    Patient: Mckenna Peraza    Procedure(s) Performed: Procedure(s) (LRB):  REPAIR, HERNIA, INCISIONAL OR VENTRAL, WITHOUT HISTORY OF PRIOR REPAIR (N/A)    Patient location: PACU    Anesthesia Type: general    Transport from OR: Transported from OR on 6-10 L/min O2 by face mask with adequate spontaneous ventilation    Post pain: adequate analgesia    Post assessment: no apparent anesthetic complications    Post vital signs: stable    Level of consciousness: awake, alert and oriented    Nausea/Vomiting: no nausea/vomiting    Complications: none    Transfer of care protocol was followed      Last vitals:   Visit Vitals  /69 (BP Location: Right arm, Patient Position: Lying)   Pulse 66   Temp 37.3 °C (99.1 °F) (Skin)   Resp 16   Ht 5' (1.524 m)   Wt 59 kg (130 lb)   LMP 11/10/2020   SpO2 99%   Breastfeeding No   BMI 25.39 kg/m²

## 2020-11-17 NOTE — PROGRESS NOTES
Patient has met PACU discharge criteria, VSS, pain well controlled. Family updated by phone. Released from PACU by Dr. Obando

## 2020-11-17 NOTE — ANESTHESIA POSTPROCEDURE EVALUATION
Anesthesia Post Evaluation    Patient: Mckenna Peraza    Procedure(s) Performed: Procedure(s) (LRB):  REPAIR, HERNIA, INCISIONAL OR VENTRAL, WITHOUT HISTORY OF PRIOR REPAIR (N/A)    Final Anesthesia Type: general    Patient location during evaluation: PACU  Patient participation: Yes- Able to Participate  Level of consciousness: awake and alert and oriented  Post-procedure vital signs: reviewed and stable  Pain management: adequate  Airway patency: patent    PONV status at discharge: No PONV  Anesthetic complications: no      Cardiovascular status: blood pressure returned to baseline and hemodynamically stable  Respiratory status: unassisted  Hydration status: euvolemic  Follow-up not needed.          Vitals Value Taken Time   /56 11/17/20 0912   Temp 36.7 °C (98 °F) 11/17/20 0910   Pulse 56 11/17/20 0913   Resp 18 11/17/20 0913   SpO2 100 % 11/17/20 0913   Vitals shown include unvalidated device data.      No case tracking events are documented in the log.      Pain/Jose Ramon Score: Pain Rating Prior to Med Admin: 5 (11/17/2020  8:57 AM)  Jose Ramon Score: 10 (11/17/2020  8:59 AM)

## 2020-11-17 NOTE — OP NOTE
PATIENT: Mckenna Peraza    MRN: [id number]    DATE OF PROCEDURE: 11/17/2020     PRE-OPERATIVE DIAGNOSIS:  Ventral hernia.     POST-OPERATIVE DIAGNOSIS:  Incarcerated Ventral hernia.     PROCEDURE: Primary Repair of Incarcerated Ventral Hernia     SURGEON: Barrie Shelton M.D.    ASSISTANT: Tila Urban M.D.     ANESTHESIA: General endotracheal.     ESTIMATED BLOOD LOSS: Minimal.     SPECIMEN: none    COMPLICATIONS: none     INDICATION: The patient is a 45-year-old female who presents to the clinic with a  symptomatic ventral hernia.  R/B/A to ventral hernia surgery were explained to the patient in detail.  The risks include, but are not limited to: bleeding, infection, re-operation, injury to surrounding structures,reaction to anesthesia, michael-operative cardiopulmonary events including PE/DVT, hernia recurrence, chronic pain, need for mesh removal, failure to resolve symptoms, and possible death. The patient stated a clear understanding of the risks and requests the procedure be done.    PROCEDURE IN DETAIL:  After surgical consent was obtained, the patient was transported to the operative theater and onto the operating room table in supine position.  General endotracheal anesthesia was administered without difficulty.  The patient's abdomen was prepped and draped in a standard sterile fashion.  Perioperative antibiotics were administered and a time-out was performed in order to ensure the proper patient and procedure.  An incision was made over the hernia using a scalpel.  This was carried down through the subcutaneous tissues using electrocautery and blunt dissection.  The hernia sac was identified and preserved.  The subcutaneous tissues surrounding the hernia defect were dissected free circumferentially.  The hernia sac was opened and there was incarcerated omentum.  After the remaining contents were reduced into the abdominal cavity without difficulty, the hernia sac was divided.  The hernia defect was 1 cm in  size.  1-0 permanent suture was used to reapproximate the hernia defect without any undue tension.  The wound was irrigated out with sterile saline and reinjected with local anesthetic.  The defect was closed in layers using interrupted 3-0 Vicryl and Dermabond.  At the end of the procedure, the instrument, lap, and needle counts were all correct.  The patient was awoken from anesthesia having tolerated the procedure without difficulty was returned to the PACU in stable condition.  Patient's family was updated all questions were answered.     Barrie Shelton M.D., F.A.C.S.  [facility] and General Surgery  [facility]

## 2020-11-17 NOTE — ANESTHESIA PROCEDURE NOTES
Intubation  Performed by: Adarsh Wright MD  Authorized by: Thomas Fallon MD     Intubation:     Induction:  Intravenous    Intubated:  Postinduction    Mask Ventilation:  Easy mask    Attempts:  1    Attempted By:  Resident anesthesiologist    Method of Intubation:  Direct    Blade:  Nisha 3    Laryngeal View Grade: Grade IIA - cords partially seen      Difficult Airway Encountered?: No      Complications:  None    Airway Device:  Oral endotracheal tube    Airway Device Size:  7.0    Style/Cuff Inflation:  Cuffed    Inflation Amount (mL):  8    Tube secured:  22    Secured at:  The teeth    Placement Verified By:  Capnometry    Complicating Factors:  None    Findings Post-Intubation:  BS equal bilateral and atraumatic/condition of teeth unchanged

## 2020-11-17 NOTE — INTERVAL H&P NOTE
The patient has been examined and the H&P has been reviewed:    I concur with the findings and no changes have occurred since H&P was written.    Surgery risks, benefits and alternative options discussed and understood by patient/family.          Active Hospital Problems    Diagnosis  POA    Ventral hernia without obstruction or gangrene [K43.9]  Yes      Resolved Hospital Problems   No resolved problems to display.

## 2020-11-18 ENCOUNTER — TELEPHONE (OUTPATIENT)
Dept: RHEUMATOLOGY | Facility: CLINIC | Age: 45
End: 2020-11-18

## 2020-11-18 ENCOUNTER — PATIENT MESSAGE (OUTPATIENT)
Dept: RHEUMATOLOGY | Facility: CLINIC | Age: 45
End: 2020-11-18

## 2020-11-18 DIAGNOSIS — M32.9 SLE (SYSTEMIC LUPUS ERYTHEMATOSUS RELATED SYNDROME): Primary | ICD-10-CM

## 2020-11-18 NOTE — DISCHARGE SUMMARY
[facility]  Discharge Summary  General Surgery      Admit Date: 11/17/2020    Discharge Date: 11/17/2020 11:06 AM    Attending Physician/Discharge Provider: Barrie Shelton M.D., JAROCHOCYamilaS.    Procedures Performed: Procedure(s) (LRB):  REPAIR, HERNIA, INCISIONAL OR VENTRAL, WITHOUT HISTORY OF PRIOR REPAIR (N/A)    Hospital Course:  Patient is a 45-year old female who underwent an open primary repair of small VH.  She tolerated the surgery well and was discharged home.    Final Diagnoses:   Principal Problem: Ventral hernia without obstruction or gangrene    Discharged Condition: Good    Disposition: Home or Self Care    Follow Up/Patient Instructions: Follow up with Dr. Shelton in 2 weeks.        Barrie Shelton M.D., F.A.C.S.  Avnssl[facility]Iavocfdgk-Hdzyvfn and General Surgery  [facility]

## 2020-11-21 LAB
FINAL PATHOLOGIC DIAGNOSIS: NORMAL
Lab: NORMAL

## 2020-11-22 ENCOUNTER — PATIENT MESSAGE (OUTPATIENT)
Dept: SURGERY | Facility: CLINIC | Age: 45
End: 2020-11-22

## 2020-12-04 ENCOUNTER — TELEPHONE (OUTPATIENT)
Dept: OBSTETRICS AND GYNECOLOGY | Facility: CLINIC | Age: 45
End: 2020-12-04

## 2020-12-04 NOTE — TELEPHONE ENCOUNTER
Pt states that she will call back the week of the 10th to schedule appt for mirena insertion.         ----- Message from Ginger Piedra MA sent at 12/4/2020  8:34 AM CST -----     December 3, 2020     The  [id number] - DEVICE AUTHORIZATIONS     - DC LEVONORGESTREL-RELEASING IUD (MIRENA 5YR) 52 MG    08571 (CPT®) - DC INSERT INTRAUTERINE DEVICE order placed by Tila Estevez for Mckenna Peraza is now AUTHORIZED by [facility] for 1 visit(s). Please schedule the patient before the authorization expiration date, 12/31/2020, and confirm the authorized referral (ID# [id number]) is linked to the visit.     For questions, send an In Basket message to the Pre Service Intake pool or call the [facility] Pre-Service department at [phone]. Pre-Service department hours are M-F 8am to 5pm.          Information for Referral # [id number]:       Diagnoses:    N92.0 (ICD-10-CM) - Menorrhagia with regular cycle    Z30.014 (ICD-10-CM) - Encounter for initial prescription of intrauterine contraceptive device (IUD)             Procedures:    DEVICE AUTHORIZATIONS [SSL305]    DC LEVONORGESTREL-RELEASING IUD (MIRENA 5YR) 52 MG []    DC INSERT INTRAUTERINE DEVICE [78756 (CPT®)]     Device:    Mirena -

## 2020-12-07 ENCOUNTER — TELEPHONE (OUTPATIENT)
Dept: OBSTETRICS AND GYNECOLOGY | Facility: CLINIC | Age: 45
End: 2020-12-07

## 2020-12-07 NOTE — TELEPHONE ENCOUNTER
Pt scheduled for 12/8/20 @ 4:00pm       ----- Message from Teetee Cruz sent at 12/7/2020 12:21 PM CST -----  Who Called: Dereck DONAHUE is the reqeust in detail: Patient was told to call in when she started her period for Mirena appt. Please adviseCan the clinic reply by [facility] Call Back Number: [phone]

## 2020-12-08 ENCOUNTER — PROCEDURE VISIT (OUTPATIENT)
Dept: OBSTETRICS AND GYNECOLOGY | Facility: CLINIC | Age: 45
End: 2020-12-08
Payer: COMMERCIAL

## 2020-12-08 VITALS
BODY MASS INDEX: 25.45 KG/M2 | WEIGHT: 129.63 LBS | HEIGHT: 60 IN | SYSTOLIC BLOOD PRESSURE: 98 MMHG | DIASTOLIC BLOOD PRESSURE: 60 MMHG

## 2020-12-08 DIAGNOSIS — Z30.430 ENCOUNTER FOR IUD INSERTION: Primary | ICD-10-CM

## 2020-12-08 LAB
B-HCG UR QL: NEGATIVE
CTP QC/QA: YES

## 2020-12-08 PROCEDURE — 58300 INSERT INTRAUTERINE DEVICE: CPT | Mod: S$GLB,,, | Performed by: OBSTETRICS & GYNECOLOGY

## 2020-12-08 PROCEDURE — 58300 PR INSERT INTRAUTERINE DEVICE: ICD-10-PCS | Mod: S$GLB,,, | Performed by: OBSTETRICS & GYNECOLOGY

## 2020-12-08 PROCEDURE — 99499 UNLISTED E&M SERVICE: CPT | Mod: S$GLB,,, | Performed by: OBSTETRICS & GYNECOLOGY

## 2020-12-08 PROCEDURE — 99499 NO LOS: ICD-10-PCS | Mod: S$GLB,,, | Performed by: OBSTETRICS & GYNECOLOGY

## 2020-12-08 NOTE — PATIENT INSTRUCTIONS
IUD Aftercare    Cramping is common after IUD placement.   - You can help relieve the discomfort with heating pads, Tylenol, Aspirin or Ibuprofen     (If you are allergic to these products do not take them. Take another form of pain reliever.)    Irregular bleeding and spotting is normal for the first few months after the IUD is placed.    - Some women may experience irregular bleeding/spotting for up to six months after placement.   - This bleeding can be annoying at first but usually will become lighter as time progresses.    Your period will likely be shorter and lighter with a hormonal IUD.    If you had the IUD placed for birth control:  - The Mirena, Kyleena, Jessica and Liletta IUDs are effective immediately if it was inserted within 7 days after the start of you period. If it was inserted any other times, use another       method of birth control, like condoms for at least 7 days.   - The Paragard IUD is effective immediately.    It is possible for the IUD to come out of the uterus.   - If it does slip out of place, it is most likely to happen in the first few months after being inserted.     To make sure your IUD is in place, you can feel for the IUD strings between periods.   - Place one finger into your vagina until you feel your cervix. It will feel hard and rubbery, like the end of your nose.   - The string ends should be coming through your cervix. Do not pull on the strings  - If the strings feel much longer than before, if you feel the hard plastic part of the IUD, or if you cannot feel the strings at all, the IUD may have moved out of place. Please call the clinic to make an appointment. Consider using a backup form of birth control, like a condom, until you are seen.     Keep your follow-up appointment for 4 weeks after the IUD has been placed.    Pregnancy is unlikely after IUD placement, but can happen. Please  call the clinic if you have any concerns of if your pregnancy test is positive.     The IUD should only be removed by a healthcare provider.    - The Jessica and Liletta IUD should be removed and/or replaced after 3 years.   - The Mirena and Kyleena IUD should be removed and/or replaced after 5 years.   - Paragard/Copper IUDs should be removed and/or replaced after 10 years.     Warning Signs/Call the clinic if any of the following occurs:   - Severe abdominal pain or cramping, unusual bleeding, fever or chills, foul smelling vaginal discharge, painful intercourse, or a positive pregnancy test.

## 2020-12-08 NOTE — PROCEDURES
Mckenna Peraza is a 45 y.o. female  who presents for desired Mirena IUD placement.     Vitals:    20 1601   BP: 98/60   Weight: 58.8 kg (129 lb 9.6 oz)   Height: 5' (1.524 m)       Patient's last menstrual period was 2020 (exact date).    Results for orders placed or performed during the hospital encounter of 20   COVID-19 Rapid Screening   Result Value Ref Range    SARS-CoV-2 RNA, Amplification, Qual Negative Negative     Last pap:2020    She was counseled on the risks, benefits, indications, and alternatives to IUD use. She understands that with insertion there is a risk of bleeding, infection, and uterine perforation, and expulsion of device. If uterine perforation does occur she would need additional abdominal surgery to remove the IUD. If pregnancy does occur there is an increased chance of miscarriage or ectopic pregnancy. All questions are answered. Consents signed.     Procedure:     The cervix was visualized with a speculum. A single tooth tenaculum was placed on the anterior lip of the cervix. The uterus sounds to 7 cm using sterile technique. The IUD was loaded and placed high in the uterine fundus without difficulty using sterile technique. The strings were then cut. Silver Nitrate placed at the tenaculum site with excellent hemostasis. The tenaculum and speculum were removed. The patient tolerated the procedure well.       Assessment: Contraceptive management/ IUD insertion    Plan: Manage post IUD placement pain with NSAIDS, Tylenol. Post procedure instructions given to the patient. Patient advised how to check for strings. Follow up in 4 weeks for IUD check.

## 2020-12-10 ENCOUNTER — OFFICE VISIT (OUTPATIENT)
Dept: SURGERY | Facility: CLINIC | Age: 45
End: 2020-12-10
Payer: COMMERCIAL

## 2020-12-10 VITALS
SYSTOLIC BLOOD PRESSURE: 109 MMHG | BODY MASS INDEX: 25.26 KG/M2 | HEART RATE: 68 BPM | WEIGHT: 128.63 LBS | HEIGHT: 60 IN | DIASTOLIC BLOOD PRESSURE: 66 MMHG

## 2020-12-10 DIAGNOSIS — K43.9 VENTRAL HERNIA WITHOUT OBSTRUCTION OR GANGRENE: Primary | ICD-10-CM

## 2020-12-10 PROCEDURE — 99024 POSTOP FOLLOW-UP VISIT: CPT | Mod: S$GLB,,, | Performed by: SURGERY

## 2020-12-10 PROCEDURE — 1126F AMNT PAIN NOTED NONE PRSNT: CPT | Mod: S$GLB,,, | Performed by: SURGERY

## 2020-12-10 PROCEDURE — 99999 PR PBB SHADOW E&M-EST. PATIENT-LVL III: CPT | Mod: PBBFAC,,, | Performed by: SURGERY

## 2020-12-10 PROCEDURE — 3008F PR BODY MASS INDEX (BMI) DOCUMENTED: ICD-10-PCS | Mod: CPTII,S$GLB,, | Performed by: SURGERY

## 2020-12-10 PROCEDURE — 1126F PR PAIN SEVERITY QUANTIFIED, NO PAIN PRESENT: ICD-10-PCS | Mod: S$GLB,,, | Performed by: SURGERY

## 2020-12-10 PROCEDURE — 3008F BODY MASS INDEX DOCD: CPT | Mod: CPTII,S$GLB,, | Performed by: SURGERY

## 2020-12-10 PROCEDURE — 99024 PR POST-OP FOLLOW-UP VISIT: ICD-10-PCS | Mod: S$GLB,,, | Performed by: SURGERY

## 2020-12-10 PROCEDURE — 99999 PR PBB SHADOW E&M-EST. PATIENT-LVL III: ICD-10-PCS | Mod: PBBFAC,,, | Performed by: SURGERY

## 2020-12-10 NOTE — PROGRESS NOTES
[facility]  POST-OP NOTE    HPI: Mckenna Peraza is a 45 y.o. female status post open repair of small ventral hernia.  She is doing very well and has no complaints.      VITALS:  Vitals:    12/10/20 0919   BP: 109/66   Pulse: 68       PHYSICAL EXAM:  GEN: NAD  HEENT: NCAT  ABD: incisions C/D/I    PATHOLOGY:  n/a      ASSESSMENT & PLAN:  45 y.o. female s/p open repair of a small ventral hernia.  I have recommended she maintain a light activity level through 4 weeks postop and then she can slowly get back to her normal exercise routine.  I am happy to see her back at any time if she should have issues.      Barrie Shelton M.D., F.A.C.S.  Vvfzwn-Eumhrxfop-Lbfsoeo and General Surgery  [facility]

## 2020-12-14 ENCOUNTER — TELEPHONE (OUTPATIENT)
Dept: OBSTETRICS AND GYNECOLOGY | Facility: CLINIC | Age: 45
End: 2020-12-14

## 2020-12-14 DIAGNOSIS — R79.89 ABNORMAL TSH: Primary | ICD-10-CM

## 2020-12-30 PROBLEM — N92.0 MENORRHAGIA WITH REGULAR CYCLE: Status: ACTIVE | Noted: 2020-12-30

## 2020-12-30 NOTE — PROGRESS NOTES
PATIENT: Mckenna Peraza  MRN: [id number]  DATE: 12/30/2020    Diagnosis:   1. Iron deficiency anemia due to chronic blood loss    2. Menorrhagia with regular cycle      Chief Complaint: iron deficiency anemia    Subjective:    History of Present Illness: Ms. Peraza is a 45 y.o. female who presented in October 2020 for evaluation and management of iron deficiency anemia. She was referred by Dr. Zambrano.    Telemedicine visit:  The patient location is: home  The chief complaint leading to consultation is: iron deficiency anemia.    Visit type: audiovisual    Face to Face time with patient: 20 minutes  30 minutes of total time spent on the encounter, which includes face to face time and non-face to face time preparing to see the patient (eg, review of tests), Obtaining and/or reviewing separately obtained history, Documenting clinical information in the electronic or other health record, Independently interpreting results (not separately reported) and communicating results to the patient/family/caregiver, or Care coordination (not separately reported).     Each patient to whom he or she provides medical services by telemedicine is:  (1) informed of the relationship between the physician and patient and the respective role of any other health care provider with respect to management of the patient; and (2) notified that he or she may decline to receive medical services by telemedicine and may withdraw from such care at any time.    Notes: see below      - labs in October 2020 reveal a microcytic anemia with intermittent thrombocytosis.  - iron studies (10/19/20) reveal a decreased ferritin/iron/saturated iron.  - she endorses menorrhagia. She has seen gynecology for this.    Interval history:  - she presents for a follow-up appointment for her iron deficiency anemia.  - she received four doses of iron sucrose in November 2020  - today, she reports a significant improvement in her joint pain, fatigue.      Past  medical, surgical, family, and social histories have been reviewed and updated below.    Past Medical History:   Past Medical History:   Diagnosis Date    Abnormal Pap smear of cervix        Past Surgical History:   Past Surgical History:   Procedure Laterality Date    CERVICAL BIOPSY  W/ LOOP ELECTRODE EXCISION       SECTION      five    LEFT OOPHORECTOMY         Family History:   Family History   Problem Relation Age of Onset    Diabetes Mother     Diabetes Brother     Breast cancer Neg Hx     Ovarian cancer Neg Hx     Colon cancer Neg Hx     Endometrial cancer Neg Hx        Social History:  reports that she has never smoked. She has never used smokeless tobacco. She reports that she does not drink alcohol or use drugs.    Allergies:  Review of patient's allergies indicates:  No Known Allergies    Medications:  Current Outpatient Medications   Medication Sig Dispense Refill    acetaminophen (TYLENOL) 325 MG tablet Take 325 mg by mouth every 6 (six) hours as needed for Pain.      ferrous sulfate 325 (65 FE) MG EC tablet Take 1 tablet (325 mg total) by mouth 2 (two) times daily. 60 tablet 11    hydrOXYchloroQUINE (PLAQUENIL) 200 mg tablet Take 1.5 tablets (300 mg total) by mouth once daily. 45 tablet 3    predniSONE (DELTASONE) 1 MG tablet Start taking 9mg daily and decrease by 1mg every 2 weeks until reaching 5mg daily 420 tablet 0    predniSONE (DELTASONE) 5 MG tablet Start 2021.  Take once daily after tapering down from 9 mg (Patient not taking: Reported on 12/10/2020) 90 tablet 1     Current Facility-Administered Medications   Medication Dose Route Frequency Provider Last Rate Last Dose    levonorgestreL 20 mcg/24 hours (5 yrs) 52 mg IUD 1 Intra Uterine Device  1 each Intrauterine 1 time in Clinic/HOD Tila Estevez MD           Review of Systems   Constitutional: Negative for fatigue.   HENT: Negative for sore throat.    Eyes: Negative for visual disturbance.   Respiratory:  Negative for cough and shortness of breath.    Cardiovascular: Negative for chest pain.   Gastrointestinal: Negative for abdominal pain, constipation, diarrhea, nausea and vomiting.   Genitourinary: Negative for dysuria and menstrual problem.   Musculoskeletal: Negative for back pain.   Skin: Negative for rash.   Neurological: Negative for headaches.   Hematological: Negative for adenopathy.   Psychiatric/Behavioral: The patient is not nervous/anxious.        ECOG Performance Status:   ECOG SCORE 1          Objective:      Vitals: There were no vitals filed for this visit.  BMI: There is no height or weight on file to calculate BMI.  Deferred since telemedicine visit    Physical Exam  Deferred since telemedicine visit    Laboratory Data:  Labs have been reviewed.    Lab Results   Component Value Date    WBC 6.21 12/28/2020    HGB 12.7 12/28/2020    HCT 40.2 12/28/2020    MCV 92 12/28/2020     12/28/2020           Imaging:    Assessment:       1. Iron deficiency anemia due to chronic blood loss    2. Menorrhagia with regular cycle           Plan:     1. Iron deficiency anemia due to chronic blood loss / reactive thrombocytosis  - I have reviewed her chart.  - labs in October 2020 reveal a microcytic anemia with intermittent thrombocytosis.  - iron studies (10/19/20) reveal a decreased ferritin/iron/saturated iron.  - given the severity of her iron deficiency anemia, I recommended iron sucrose weekly x 4 doses.  - she received four doses of iron sucrose in November 2020  - Labs have been reviewed. Her iron deficiency anemia has resolved. Ferritin is 83 ng/mL  - continue oral iron. Decrease frequency to once daily.  - return to clinic in 3 months with repeat labs.    2. Menorrhagia with regular cycles.  - the cause of her iron deficiency anemia.  - she had an IUD placed about one month ago  - defer management to gynecology.    - return to clinic in 3 months with repeat labs.    Len Celis,  M.D.  Hematology/Oncology  [facility] - [address], [address]  [place] [address]  Phone: [phone]  Fax: [phone]

## 2020-12-31 ENCOUNTER — OFFICE VISIT (OUTPATIENT)
Dept: HEMATOLOGY/ONCOLOGY | Facility: CLINIC | Age: 45
End: 2020-12-31
Payer: COMMERCIAL

## 2020-12-31 DIAGNOSIS — D50.0 IRON DEFICIENCY ANEMIA DUE TO CHRONIC BLOOD LOSS: Primary | ICD-10-CM

## 2020-12-31 DIAGNOSIS — N92.0 MENORRHAGIA WITH REGULAR CYCLE: ICD-10-CM

## 2020-12-31 PROCEDURE — 99214 OFFICE O/P EST MOD 30 MIN: CPT | Mod: 95,,, | Performed by: INTERNAL MEDICINE

## 2020-12-31 PROCEDURE — 99214 PR OFFICE/OUTPT VISIT, EST, LEVL IV, 30-39 MIN: ICD-10-PCS | Mod: 95,,, | Performed by: INTERNAL MEDICINE

## 2020-12-31 NOTE — Clinical Note
1. Schedule labs CBC, ferritin, iron/TIBC in [place] in 3 months.  2. Schedule follow-up virtual visit 1-2 days after labs.    Thanks!

## 2021-01-04 ENCOUNTER — TELEPHONE (OUTPATIENT)
Dept: OBSTETRICS AND GYNECOLOGY | Facility: CLINIC | Age: 46
End: 2021-01-04

## 2021-01-14 ENCOUNTER — PATIENT OUTREACH (OUTPATIENT)
Dept: ADMINISTRATIVE | Facility: OTHER | Age: 46
End: 2021-01-14

## 2021-01-19 ENCOUNTER — OFFICE VISIT (OUTPATIENT)
Dept: OBSTETRICS AND GYNECOLOGY | Facility: CLINIC | Age: 46
End: 2021-01-19
Payer: COMMERCIAL

## 2021-01-19 VITALS
WEIGHT: 135.81 LBS | BODY MASS INDEX: 26.66 KG/M2 | SYSTOLIC BLOOD PRESSURE: 110 MMHG | DIASTOLIC BLOOD PRESSURE: 60 MMHG | HEIGHT: 60 IN

## 2021-01-19 DIAGNOSIS — Z30.431 IUD CHECK UP: Primary | ICD-10-CM

## 2021-01-19 PROCEDURE — 99213 OFFICE O/P EST LOW 20 MIN: CPT | Mod: S$GLB,,, | Performed by: OBSTETRICS & GYNECOLOGY

## 2021-01-19 PROCEDURE — 1126F AMNT PAIN NOTED NONE PRSNT: CPT | Mod: S$GLB,,, | Performed by: OBSTETRICS & GYNECOLOGY

## 2021-01-19 PROCEDURE — 1126F PR PAIN SEVERITY QUANTIFIED, NO PAIN PRESENT: ICD-10-PCS | Mod: S$GLB,,, | Performed by: OBSTETRICS & GYNECOLOGY

## 2021-01-19 PROCEDURE — 3008F BODY MASS INDEX DOCD: CPT | Mod: CPTII,S$GLB,, | Performed by: OBSTETRICS & GYNECOLOGY

## 2021-01-19 PROCEDURE — 3008F PR BODY MASS INDEX (BMI) DOCUMENTED: ICD-10-PCS | Mod: CPTII,S$GLB,, | Performed by: OBSTETRICS & GYNECOLOGY

## 2021-01-19 PROCEDURE — 99213 PR OFFICE/OUTPT VISIT, EST, LEVL III, 20-29 MIN: ICD-10-PCS | Mod: S$GLB,,, | Performed by: OBSTETRICS & GYNECOLOGY

## 2021-01-19 PROCEDURE — 99999 PR PBB SHADOW E&M-EST. PATIENT-LVL III: CPT | Mod: PBBFAC,,, | Performed by: OBSTETRICS & GYNECOLOGY

## 2021-01-19 PROCEDURE — 99999 PR PBB SHADOW E&M-EST. PATIENT-LVL III: ICD-10-PCS | Mod: PBBFAC,,, | Performed by: OBSTETRICS & GYNECOLOGY

## 2021-01-19 RX ORDER — FERROUS SULFATE 325(65) MG
1 TABLET ORAL 2 TIMES DAILY
COMMUNITY
Start: 2021-01-01 | End: 2022-12-05

## 2021-01-19 RX ORDER — DUPILUMAB 300 MG/2ML
INJECTION, SOLUTION SUBCUTANEOUS
COMMUNITY
Start: 2020-10-14 | End: 2021-01-19

## 2021-01-26 ENCOUNTER — OFFICE VISIT (OUTPATIENT)
Dept: FAMILY MEDICINE | Facility: CLINIC | Age: 46
End: 2021-01-26
Payer: COMMERCIAL

## 2021-01-26 VITALS
TEMPERATURE: 98 F | HEART RATE: 75 BPM | OXYGEN SATURATION: 98 % | SYSTOLIC BLOOD PRESSURE: 104 MMHG | DIASTOLIC BLOOD PRESSURE: 62 MMHG | BODY MASS INDEX: 26.44 KG/M2 | WEIGHT: 134.69 LBS | HEIGHT: 60 IN

## 2021-01-26 DIAGNOSIS — K43.9 VENTRAL HERNIA WITHOUT OBSTRUCTION OR GANGRENE: ICD-10-CM

## 2021-01-26 DIAGNOSIS — M32.9 SYSTEMIC LUPUS ERYTHEMATOSUS, UNSPECIFIED SLE TYPE, UNSPECIFIED ORGAN INVOLVEMENT STATUS: ICD-10-CM

## 2021-01-26 DIAGNOSIS — L30.9 ECZEMA, UNSPECIFIED TYPE: ICD-10-CM

## 2021-01-26 DIAGNOSIS — D50.0 IRON DEFICIENCY ANEMIA DUE TO CHRONIC BLOOD LOSS: ICD-10-CM

## 2021-01-26 DIAGNOSIS — M32.9 SLE (SYSTEMIC LUPUS ERYTHEMATOSUS RELATED SYNDROME): Primary | ICD-10-CM

## 2021-01-26 DIAGNOSIS — N92.0 MENORRHAGIA WITH REGULAR CYCLE: ICD-10-CM

## 2021-01-26 PROCEDURE — 3008F PR BODY MASS INDEX (BMI) DOCUMENTED: ICD-10-PCS | Mod: CPTII,S$GLB,, | Performed by: INTERNAL MEDICINE

## 2021-01-26 PROCEDURE — 1126F AMNT PAIN NOTED NONE PRSNT: CPT | Mod: S$GLB,,, | Performed by: INTERNAL MEDICINE

## 2021-01-26 PROCEDURE — 99214 OFFICE O/P EST MOD 30 MIN: CPT | Mod: S$GLB,,, | Performed by: INTERNAL MEDICINE

## 2021-01-26 PROCEDURE — 1126F PR PAIN SEVERITY QUANTIFIED, NO PAIN PRESENT: ICD-10-PCS | Mod: S$GLB,,, | Performed by: INTERNAL MEDICINE

## 2021-01-26 PROCEDURE — 99999 PR PBB SHADOW E&M-EST. PATIENT-LVL III: CPT | Mod: PBBFAC,,, | Performed by: INTERNAL MEDICINE

## 2021-01-26 PROCEDURE — 99214 PR OFFICE/OUTPT VISIT, EST, LEVL IV, 30-39 MIN: ICD-10-PCS | Mod: S$GLB,,, | Performed by: INTERNAL MEDICINE

## 2021-01-26 PROCEDURE — 3008F BODY MASS INDEX DOCD: CPT | Mod: CPTII,S$GLB,, | Performed by: INTERNAL MEDICINE

## 2021-01-26 PROCEDURE — 99999 PR PBB SHADOW E&M-EST. PATIENT-LVL III: ICD-10-PCS | Mod: PBBFAC,,, | Performed by: INTERNAL MEDICINE

## 2021-02-04 ENCOUNTER — TELEPHONE (OUTPATIENT)
Dept: RHEUMATOLOGY | Facility: CLINIC | Age: 46
End: 2021-02-04

## 2021-02-10 PROBLEM — R76.8 POSITIVE ANA (ANTINUCLEAR ANTIBODY): Status: RESOLVED | Noted: 2020-10-20 | Resolved: 2021-02-10

## 2021-02-10 PROBLEM — K42.9 UMBILICAL HERNIA: Status: RESOLVED | Noted: 2020-10-27 | Resolved: 2021-02-10

## 2021-02-23 ENCOUNTER — PATIENT MESSAGE (OUTPATIENT)
Dept: RHEUMATOLOGY | Facility: CLINIC | Age: 46
End: 2021-02-23

## 2021-03-19 DIAGNOSIS — M32.9 SYSTEMIC LUPUS ERYTHEMATOSUS, UNSPECIFIED SLE TYPE, UNSPECIFIED ORGAN INVOLVEMENT STATUS: ICD-10-CM

## 2021-03-21 RX ORDER — HYDROXYCHLOROQUINE SULFATE 200 MG/1
300 TABLET, FILM COATED ORAL DAILY
Qty: 45 TABLET | Refills: 3 | Status: SHIPPED | OUTPATIENT
Start: 2021-03-21 | End: 2021-10-19 | Stop reason: SDUPTHER

## 2021-03-30 ENCOUNTER — OFFICE VISIT (OUTPATIENT)
Dept: HEMATOLOGY/ONCOLOGY | Facility: CLINIC | Age: 46
End: 2021-03-30
Payer: COMMERCIAL

## 2021-03-30 ENCOUNTER — PATIENT OUTREACH (OUTPATIENT)
Dept: ADMINISTRATIVE | Facility: OTHER | Age: 46
End: 2021-03-30

## 2021-03-30 DIAGNOSIS — D50.0 IRON DEFICIENCY ANEMIA DUE TO CHRONIC BLOOD LOSS: Primary | ICD-10-CM

## 2021-03-30 DIAGNOSIS — N92.0 MENORRHAGIA WITH REGULAR CYCLE: ICD-10-CM

## 2021-03-30 PROCEDURE — 99213 PR OFFICE/OUTPT VISIT, EST, LEVL III, 20-29 MIN: ICD-10-PCS | Mod: 95,,, | Performed by: INTERNAL MEDICINE

## 2021-03-30 PROCEDURE — 99213 OFFICE O/P EST LOW 20 MIN: CPT | Mod: 95,,, | Performed by: INTERNAL MEDICINE

## 2021-06-29 ENCOUNTER — LAB VISIT (OUTPATIENT)
Dept: LAB | Facility: HOSPITAL | Age: 46
End: 2021-06-29
Attending: INTERNAL MEDICINE
Payer: COMMERCIAL

## 2021-06-29 DIAGNOSIS — D50.0 IRON DEFICIENCY ANEMIA DUE TO CHRONIC BLOOD LOSS: ICD-10-CM

## 2021-06-29 LAB
BASOPHILS # BLD AUTO: 0.01 K/UL (ref 0–0.2)
BASOPHILS NFR BLD: 0.2 % (ref 0–1.9)
DIFFERENTIAL METHOD: ABNORMAL
EOSINOPHIL # BLD AUTO: 0 K/UL (ref 0–0.5)
EOSINOPHIL NFR BLD: 0.5 % (ref 0–8)
ERYTHROCYTE [DISTWIDTH] IN BLOOD BY AUTOMATED COUNT: 11.9 % (ref 11.5–14.5)
FERRITIN SERPL-MCNC: 90 NG/ML (ref 20–300)
HCT VFR BLD AUTO: 39.7 % (ref 37–48.5)
HGB BLD-MCNC: 12.9 G/DL (ref 12–16)
IMM GRANULOCYTES # BLD AUTO: 0.03 K/UL (ref 0–0.04)
IMM GRANULOCYTES NFR BLD AUTO: 0.7 % (ref 0–0.5)
LYMPHOCYTES # BLD AUTO: 1.4 K/UL (ref 1–4.8)
LYMPHOCYTES NFR BLD: 31.9 % (ref 18–48)
MCH RBC QN AUTO: 33.2 PG (ref 27–31)
MCHC RBC AUTO-ENTMCNC: 32.5 G/DL (ref 32–36)
MCV RBC AUTO: 102 FL (ref 82–98)
MONOCYTES # BLD AUTO: 0.8 K/UL (ref 0.3–1)
MONOCYTES NFR BLD: 18.8 % (ref 4–15)
NEUTROPHILS # BLD AUTO: 2.1 K/UL (ref 1.8–7.7)
NEUTROPHILS NFR BLD: 47.9 % (ref 38–73)
NRBC BLD-RTO: 0 /100 WBC
PLATELET # BLD AUTO: 222 K/UL (ref 150–450)
PMV BLD AUTO: 10.9 FL (ref 9.2–12.9)
RBC # BLD AUTO: 3.89 M/UL (ref 4–5.4)
WBC # BLD AUTO: 4.32 K/UL (ref 3.9–12.7)

## 2021-06-29 PROCEDURE — 85025 COMPLETE CBC W/AUTO DIFF WBC: CPT | Performed by: INTERNAL MEDICINE

## 2021-06-29 PROCEDURE — 82728 ASSAY OF FERRITIN: CPT | Performed by: INTERNAL MEDICINE

## 2021-06-29 PROCEDURE — 83540 ASSAY OF IRON: CPT | Performed by: INTERNAL MEDICINE

## 2021-06-29 PROCEDURE — 36415 COLL VENOUS BLD VENIPUNCTURE: CPT | Performed by: INTERNAL MEDICINE

## 2021-06-30 ENCOUNTER — OFFICE VISIT (OUTPATIENT)
Dept: HEMATOLOGY/ONCOLOGY | Facility: CLINIC | Age: 46
End: 2021-06-30
Payer: COMMERCIAL

## 2021-06-30 DIAGNOSIS — D50.0 IRON DEFICIENCY ANEMIA DUE TO CHRONIC BLOOD LOSS: Primary | ICD-10-CM

## 2021-06-30 DIAGNOSIS — N92.0 MENORRHAGIA WITH REGULAR CYCLE: ICD-10-CM

## 2021-06-30 LAB
IRON SERPL-MCNC: 120 UG/DL (ref 30–160)
SATURATED IRON: 46 % (ref 20–50)
TOTAL IRON BINDING CAPACITY: 263 UG/DL (ref 250–450)
TRANSFERRIN SERPL-MCNC: 178 MG/DL (ref 200–375)

## 2021-06-30 PROCEDURE — 99213 PR OFFICE/OUTPT VISIT, EST, LEVL III, 20-29 MIN: ICD-10-PCS | Mod: 95,,, | Performed by: INTERNAL MEDICINE

## 2021-06-30 PROCEDURE — 99213 OFFICE O/P EST LOW 20 MIN: CPT | Mod: 95,,, | Performed by: INTERNAL MEDICINE

## 2021-10-19 ENCOUNTER — OFFICE VISIT (OUTPATIENT)
Dept: FAMILY MEDICINE | Facility: CLINIC | Age: 46
End: 2021-10-19
Payer: COMMERCIAL

## 2021-10-19 VITALS
TEMPERATURE: 98 F | RESPIRATION RATE: 18 BRPM | BODY MASS INDEX: 28.05 KG/M2 | SYSTOLIC BLOOD PRESSURE: 110 MMHG | OXYGEN SATURATION: 98 % | HEIGHT: 60 IN | WEIGHT: 142.88 LBS | DIASTOLIC BLOOD PRESSURE: 80 MMHG | HEART RATE: 73 BPM

## 2021-10-19 DIAGNOSIS — N92.0 MENORRHAGIA WITH REGULAR CYCLE: ICD-10-CM

## 2021-10-19 DIAGNOSIS — Z12.31 ENCOUNTER FOR SCREENING MAMMOGRAM FOR MALIGNANT NEOPLASM OF BREAST: Primary | ICD-10-CM

## 2021-10-19 DIAGNOSIS — M32.9 SYSTEMIC LUPUS ERYTHEMATOSUS, UNSPECIFIED SLE TYPE, UNSPECIFIED ORGAN INVOLVEMENT STATUS: ICD-10-CM

## 2021-10-19 DIAGNOSIS — R63.5 WEIGHT GAIN: ICD-10-CM

## 2021-10-19 DIAGNOSIS — D50.0 IRON DEFICIENCY ANEMIA DUE TO CHRONIC BLOOD LOSS: ICD-10-CM

## 2021-10-19 DIAGNOSIS — M32.9 SLE (SYSTEMIC LUPUS ERYTHEMATOSUS RELATED SYNDROME): ICD-10-CM

## 2021-10-19 PROCEDURE — 3074F PR MOST RECENT SYSTOLIC BLOOD PRESSURE < 130 MM HG: ICD-10-PCS | Mod: CPTII,S$GLB,, | Performed by: INTERNAL MEDICINE

## 2021-10-19 PROCEDURE — 3008F PR BODY MASS INDEX (BMI) DOCUMENTED: ICD-10-PCS | Mod: CPTII,S$GLB,, | Performed by: INTERNAL MEDICINE

## 2021-10-19 PROCEDURE — 3079F PR MOST RECENT DIASTOLIC BLOOD PRESSURE 80-89 MM HG: ICD-10-PCS | Mod: CPTII,S$GLB,, | Performed by: INTERNAL MEDICINE

## 2021-10-19 PROCEDURE — 99999 PR PBB SHADOW E&M-EST. PATIENT-LVL IV: ICD-10-PCS | Mod: PBBFAC,,, | Performed by: INTERNAL MEDICINE

## 2021-10-19 PROCEDURE — 1159F MED LIST DOCD IN RCRD: CPT | Mod: CPTII,S$GLB,, | Performed by: INTERNAL MEDICINE

## 2021-10-19 PROCEDURE — 3074F SYST BP LT 130 MM HG: CPT | Mod: CPTII,S$GLB,, | Performed by: INTERNAL MEDICINE

## 2021-10-19 PROCEDURE — 99214 OFFICE O/P EST MOD 30 MIN: CPT | Mod: S$GLB,,, | Performed by: INTERNAL MEDICINE

## 2021-10-19 PROCEDURE — 3008F BODY MASS INDEX DOCD: CPT | Mod: CPTII,S$GLB,, | Performed by: INTERNAL MEDICINE

## 2021-10-19 PROCEDURE — 99999 PR PBB SHADOW E&M-EST. PATIENT-LVL IV: CPT | Mod: PBBFAC,,, | Performed by: INTERNAL MEDICINE

## 2021-10-19 PROCEDURE — 3079F DIAST BP 80-89 MM HG: CPT | Mod: CPTII,S$GLB,, | Performed by: INTERNAL MEDICINE

## 2021-10-19 PROCEDURE — 1159F PR MEDICATION LIST DOCUMENTED IN MEDICAL RECORD: ICD-10-PCS | Mod: CPTII,S$GLB,, | Performed by: INTERNAL MEDICINE

## 2021-10-19 PROCEDURE — 99214 PR OFFICE/OUTPT VISIT, EST, LEVL IV, 30-39 MIN: ICD-10-PCS | Mod: S$GLB,,, | Performed by: INTERNAL MEDICINE

## 2021-10-19 RX ORDER — PREDNISONE 5 MG/1
TABLET ORAL
Qty: 90 TABLET | Refills: 0 | Status: SHIPPED | OUTPATIENT
Start: 2021-10-19 | End: 2021-11-24

## 2021-10-19 RX ORDER — HYDROXYZINE HYDROCHLORIDE 10 MG/1
10 TABLET, FILM COATED ORAL EVERY 6 HOURS PRN
COMMUNITY
Start: 2021-10-14 | End: 2023-04-04 | Stop reason: SDUPTHER

## 2021-10-19 RX ORDER — HYDROXYCHLOROQUINE SULFATE 200 MG/1
300 TABLET, FILM COATED ORAL DAILY
Qty: 135 TABLET | Refills: 0 | Status: SHIPPED | OUTPATIENT
Start: 2021-10-19 | End: 2022-02-03 | Stop reason: SDUPTHER

## 2021-11-18 ENCOUNTER — IMMUNIZATION (OUTPATIENT)
Dept: INTERNAL MEDICINE | Facility: CLINIC | Age: 46
End: 2021-11-18
Payer: COMMERCIAL

## 2021-11-18 DIAGNOSIS — Z23 NEED FOR VACCINATION: Primary | ICD-10-CM

## 2021-11-18 PROCEDURE — 0001A COVID-19, MRNA, LNP-S, PF, 30 MCG/0.3 ML DOSE VACCINE: CPT | Mod: PBBFAC | Performed by: INTERNAL MEDICINE

## 2021-11-24 ENCOUNTER — LAB VISIT (OUTPATIENT)
Dept: LAB | Facility: HOSPITAL | Age: 46
End: 2021-11-24
Payer: COMMERCIAL

## 2021-11-24 ENCOUNTER — OFFICE VISIT (OUTPATIENT)
Dept: RHEUMATOLOGY | Facility: CLINIC | Age: 46
End: 2021-11-24
Payer: COMMERCIAL

## 2021-11-24 VITALS
WEIGHT: 145.94 LBS | SYSTOLIC BLOOD PRESSURE: 137 MMHG | HEIGHT: 60 IN | DIASTOLIC BLOOD PRESSURE: 76 MMHG | HEART RATE: 74 BPM | BODY MASS INDEX: 28.65 KG/M2

## 2021-11-24 DIAGNOSIS — M32.9 SLE (SYSTEMIC LUPUS ERYTHEMATOSUS RELATED SYNDROME): ICD-10-CM

## 2021-11-24 DIAGNOSIS — M32.9 SLE (SYSTEMIC LUPUS ERYTHEMATOSUS RELATED SYNDROME): Primary | ICD-10-CM

## 2021-11-24 DIAGNOSIS — Z79.52 LONG TERM (CURRENT) USE OF SYSTEMIC STEROIDS: ICD-10-CM

## 2021-11-24 DIAGNOSIS — M32.9 SYSTEMIC LUPUS ERYTHEMATOSUS, UNSPECIFIED SLE TYPE, UNSPECIFIED ORGAN INVOLVEMENT STATUS: ICD-10-CM

## 2021-11-24 LAB
ALBUMIN SERPL BCP-MCNC: 3.8 G/DL (ref 3.5–5.2)
ALP SERPL-CCNC: 44 U/L (ref 55–135)
ALT SERPL W/O P-5'-P-CCNC: 35 U/L (ref 10–44)
ANION GAP SERPL CALC-SCNC: 8 MMOL/L (ref 8–16)
AST SERPL-CCNC: 26 U/L (ref 10–40)
BASOPHILS # BLD AUTO: 0.02 K/UL (ref 0–0.2)
BASOPHILS NFR BLD: 0.3 % (ref 0–1.9)
BILIRUB SERPL-MCNC: 0.5 MG/DL (ref 0.1–1)
BUN SERPL-MCNC: 10 MG/DL (ref 6–20)
C3 SERPL-MCNC: 102 MG/DL (ref 50–180)
C4 SERPL-MCNC: 27 MG/DL (ref 11–44)
CALCIUM SERPL-MCNC: 9.2 MG/DL (ref 8.7–10.5)
CHLORIDE SERPL-SCNC: 106 MMOL/L (ref 95–110)
CO2 SERPL-SCNC: 25 MMOL/L (ref 23–29)
CREAT SERPL-MCNC: 0.7 MG/DL (ref 0.5–1.4)
CRP SERPL-MCNC: 1 MG/L (ref 0–8.2)
DIFFERENTIAL METHOD: ABNORMAL
EOSINOPHIL # BLD AUTO: 0.1 K/UL (ref 0–0.5)
EOSINOPHIL NFR BLD: 0.9 % (ref 0–8)
ERYTHROCYTE [DISTWIDTH] IN BLOOD BY AUTOMATED COUNT: 12.6 % (ref 11.5–14.5)
ERYTHROCYTE [SEDIMENTATION RATE] IN BLOOD BY WESTERGREN METHOD: 3 MM/HR (ref 0–36)
EST. GFR  (AFRICAN AMERICAN): >60 ML/MIN/1.73 M^2
EST. GFR  (NON AFRICAN AMERICAN): >60 ML/MIN/1.73 M^2
GLUCOSE SERPL-MCNC: 80 MG/DL (ref 70–110)
HCT VFR BLD AUTO: 44.6 % (ref 37–48.5)
HGB BLD-MCNC: 14.5 G/DL (ref 12–16)
IMM GRANULOCYTES # BLD AUTO: 0.04 K/UL (ref 0–0.04)
IMM GRANULOCYTES NFR BLD AUTO: 0.6 % (ref 0–0.5)
LYMPHOCYTES # BLD AUTO: 2.5 K/UL (ref 1–4.8)
LYMPHOCYTES NFR BLD: 39.2 % (ref 18–48)
MCH RBC QN AUTO: 32.6 PG (ref 27–31)
MCHC RBC AUTO-ENTMCNC: 32.5 G/DL (ref 32–36)
MCV RBC AUTO: 100 FL (ref 82–98)
MONOCYTES # BLD AUTO: 0.8 K/UL (ref 0.3–1)
MONOCYTES NFR BLD: 12.7 % (ref 4–15)
NEUTROPHILS # BLD AUTO: 3 K/UL (ref 1.8–7.7)
NEUTROPHILS NFR BLD: 46.3 % (ref 38–73)
NRBC BLD-RTO: 0 /100 WBC
PLATELET # BLD AUTO: 188 K/UL (ref 150–450)
PLATELET BLD QL SMEAR: ABNORMAL
PMV BLD AUTO: 11.3 FL (ref 9.2–12.9)
POTASSIUM SERPL-SCNC: 3.9 MMOL/L (ref 3.5–5.1)
PROT SERPL-MCNC: 7 G/DL (ref 6–8.4)
RBC # BLD AUTO: 4.45 M/UL (ref 4–5.4)
SODIUM SERPL-SCNC: 139 MMOL/L (ref 136–145)
WBC # BLD AUTO: 6.38 K/UL (ref 3.9–12.7)

## 2021-11-24 PROCEDURE — 99999 PR PBB SHADOW E&M-EST. PATIENT-LVL IV: ICD-10-PCS | Mod: PBBFAC,,,

## 2021-11-24 PROCEDURE — 99999 PR PBB SHADOW E&M-EST. PATIENT-LVL IV: CPT | Mod: PBBFAC,,,

## 2021-11-24 PROCEDURE — 80053 COMPREHEN METABOLIC PANEL: CPT

## 2021-11-24 PROCEDURE — 85652 RBC SED RATE AUTOMATED: CPT

## 2021-11-24 PROCEDURE — 85025 COMPLETE CBC W/AUTO DIFF WBC: CPT

## 2021-11-24 PROCEDURE — 36415 COLL VENOUS BLD VENIPUNCTURE: CPT

## 2021-11-24 PROCEDURE — 99214 PR OFFICE/OUTPT VISIT, EST, LEVL IV, 30-39 MIN: ICD-10-PCS | Mod: S$GLB,,,

## 2021-11-24 PROCEDURE — 99214 OFFICE O/P EST MOD 30 MIN: CPT | Mod: S$GLB,,,

## 2021-11-24 PROCEDURE — 86160 COMPLEMENT ANTIGEN: CPT

## 2021-11-24 PROCEDURE — 86140 C-REACTIVE PROTEIN: CPT

## 2021-11-24 PROCEDURE — 86160 COMPLEMENT ANTIGEN: CPT | Mod: 59

## 2021-11-24 RX ORDER — CLOBETASOL PROPIONATE 0.46 MG/ML
SOLUTION TOPICAL DAILY
COMMUNITY
Start: 2021-10-14 | End: 2023-06-21

## 2021-11-24 RX ORDER — PREDNISONE 1 MG/1
TABLET ORAL
Qty: 120 TABLET | Refills: 2 | Status: SHIPPED | OUTPATIENT
Start: 2021-11-24 | End: 2022-01-19

## 2021-11-24 RX ORDER — TRIAMCINOLONE ACETONIDE 1 MG/G
1 OINTMENT TOPICAL 2 TIMES DAILY
COMMUNITY
Start: 2021-10-14 | End: 2023-11-07

## 2021-12-09 ENCOUNTER — IMMUNIZATION (OUTPATIENT)
Dept: INTERNAL MEDICINE | Facility: CLINIC | Age: 46
End: 2021-12-09
Payer: COMMERCIAL

## 2021-12-09 DIAGNOSIS — Z23 NEED FOR VACCINATION: Primary | ICD-10-CM

## 2021-12-09 PROCEDURE — 91300 COVID-19, MRNA, LNP-S, PF, 30 MCG/0.3 ML DOSE VACCINE: CPT | Mod: PBBFAC | Performed by: FAMILY MEDICINE

## 2021-12-09 PROCEDURE — 0002A COVID-19, MRNA, LNP-S, PF, 30 MCG/0.3 ML DOSE VACCINE: CPT | Mod: PBBFAC | Performed by: FAMILY MEDICINE

## 2021-12-17 ENCOUNTER — HOSPITAL ENCOUNTER (OUTPATIENT)
Dept: RADIOLOGY | Facility: HOSPITAL | Age: 46
Discharge: HOME | End: 2021-12-17
Payer: COMMERCIAL

## 2021-12-17 DIAGNOSIS — Z79.52 LONG TERM (CURRENT) USE OF SYSTEMIC STEROIDS: ICD-10-CM

## 2021-12-17 DIAGNOSIS — M32.9 SLE (SYSTEMIC LUPUS ERYTHEMATOSUS RELATED SYNDROME): ICD-10-CM

## 2021-12-17 PROCEDURE — 77080 DXA BONE DENSITY AXIAL: CPT | Mod: TC

## 2021-12-17 PROCEDURE — 77080 DXA BONE DENSITY AXIAL: CPT | Mod: 26,,, | Performed by: RADIOLOGY

## 2021-12-17 PROCEDURE — 77080 DEXA BONE DENSITY SPINE HIP: ICD-10-PCS | Mod: 26,,, | Performed by: RADIOLOGY

## 2021-12-30 ENCOUNTER — OFFICE VISIT (OUTPATIENT)
Dept: HEMATOLOGY/ONCOLOGY | Facility: CLINIC | Age: 46
End: 2021-12-30
Payer: COMMERCIAL

## 2021-12-30 DIAGNOSIS — D50.0 IRON DEFICIENCY ANEMIA DUE TO CHRONIC BLOOD LOSS: Primary | ICD-10-CM

## 2021-12-30 DIAGNOSIS — N92.0 MENORRHAGIA WITH REGULAR CYCLE: ICD-10-CM

## 2021-12-30 PROCEDURE — 1160F RVW MEDS BY RX/DR IN RCRD: CPT | Mod: CPTII,95,, | Performed by: INTERNAL MEDICINE

## 2021-12-30 PROCEDURE — 99213 OFFICE O/P EST LOW 20 MIN: CPT | Mod: 95,,, | Performed by: INTERNAL MEDICINE

## 2021-12-30 PROCEDURE — 99213 PR OFFICE/OUTPT VISIT, EST, LEVL III, 20-29 MIN: ICD-10-PCS | Mod: 95,,, | Performed by: INTERNAL MEDICINE

## 2021-12-30 PROCEDURE — 1159F PR MEDICATION LIST DOCUMENTED IN MEDICAL RECORD: ICD-10-PCS | Mod: CPTII,95,, | Performed by: INTERNAL MEDICINE

## 2021-12-30 PROCEDURE — 1160F PR REVIEW ALL MEDS BY PRESCRIBER/CLIN PHARMACIST DOCUMENTED: ICD-10-PCS | Mod: CPTII,95,, | Performed by: INTERNAL MEDICINE

## 2021-12-30 PROCEDURE — 1159F MED LIST DOCD IN RCRD: CPT | Mod: CPTII,95,, | Performed by: INTERNAL MEDICINE

## 2022-02-03 DIAGNOSIS — M32.9 SYSTEMIC LUPUS ERYTHEMATOSUS, UNSPECIFIED SLE TYPE, UNSPECIFIED ORGAN INVOLVEMENT STATUS: ICD-10-CM

## 2022-02-03 RX ORDER — HYDROXYCHLOROQUINE SULFATE 200 MG/1
300 TABLET, FILM COATED ORAL DAILY
Qty: 135 TABLET | Refills: 0 | Status: SHIPPED | OUTPATIENT
Start: 2022-02-03 | End: 2022-05-01 | Stop reason: SDUPTHER

## 2022-02-10 ENCOUNTER — PATIENT OUTREACH (OUTPATIENT)
Dept: ADMINISTRATIVE | Facility: OTHER | Age: 47
End: 2022-02-10
Payer: COMMERCIAL

## 2022-02-11 NOTE — PROGRESS NOTES
Health Maintenance Due   Topic Date Due    TETANUS VACCINE  Never done    Influenza Vaccine (1) Never done    Colorectal Cancer Screening  11/03/2021     Updates were requested from care everywhere.  Chart was reviewed for overdue Proactive [facility] Encounters ([facility]) topics (CRS, Breast Cancer Screening, Eye exam)  Health Maintenance has been updated.  LINKS immunization registry triggered.  Immunizations were reconciled.

## 2022-02-14 ENCOUNTER — LAB VISIT (OUTPATIENT)
Dept: LAB | Facility: HOSPITAL | Age: 47
End: 2022-02-14
Payer: COMMERCIAL

## 2022-02-14 ENCOUNTER — OFFICE VISIT (OUTPATIENT)
Dept: RHEUMATOLOGY | Facility: CLINIC | Age: 47
End: 2022-02-14
Payer: COMMERCIAL

## 2022-02-14 VITALS — BODY MASS INDEX: 29.95 KG/M2 | WEIGHT: 152.56 LBS | HEIGHT: 60 IN

## 2022-02-14 DIAGNOSIS — M32.9 SLE (SYSTEMIC LUPUS ERYTHEMATOSUS RELATED SYNDROME): Primary | ICD-10-CM

## 2022-02-14 DIAGNOSIS — M32.9 SLE (SYSTEMIC LUPUS ERYTHEMATOSUS RELATED SYNDROME): ICD-10-CM

## 2022-02-14 DIAGNOSIS — M32.9 SYSTEMIC LUPUS ERYTHEMATOSUS, UNSPECIFIED SLE TYPE, UNSPECIFIED ORGAN INVOLVEMENT STATUS: ICD-10-CM

## 2022-02-14 LAB
ALBUMIN SERPL BCP-MCNC: 4.2 G/DL (ref 3.5–5.2)
ALP SERPL-CCNC: 50 U/L (ref 55–135)
ALT SERPL W/O P-5'-P-CCNC: 29 U/L (ref 10–44)
ANION GAP SERPL CALC-SCNC: 7 MMOL/L (ref 8–16)
AST SERPL-CCNC: 19 U/L (ref 10–40)
BASOPHILS # BLD AUTO: 0.02 K/UL (ref 0–0.2)
BASOPHILS NFR BLD: 0.4 % (ref 0–1.9)
BILIRUB SERPL-MCNC: 0.4 MG/DL (ref 0.1–1)
BUN SERPL-MCNC: 13 MG/DL (ref 6–20)
C3 SERPL-MCNC: 113 MG/DL (ref 50–180)
C4 SERPL-MCNC: 27 MG/DL (ref 11–44)
CALCIUM SERPL-MCNC: 9.6 MG/DL (ref 8.7–10.5)
CHLORIDE SERPL-SCNC: 105 MMOL/L (ref 95–110)
CO2 SERPL-SCNC: 30 MMOL/L (ref 23–29)
CREAT SERPL-MCNC: 0.7 MG/DL (ref 0.5–1.4)
CRP SERPL-MCNC: 0.6 MG/L (ref 0–8.2)
DIFFERENTIAL METHOD: ABNORMAL
EOSINOPHIL # BLD AUTO: 0 K/UL (ref 0–0.5)
EOSINOPHIL NFR BLD: 0.7 % (ref 0–8)
ERYTHROCYTE [DISTWIDTH] IN BLOOD BY AUTOMATED COUNT: 11.9 % (ref 11.5–14.5)
ERYTHROCYTE [SEDIMENTATION RATE] IN BLOOD BY WESTERGREN METHOD: 6 MM/HR (ref 0–36)
EST. GFR  (AFRICAN AMERICAN): >60 ML/MIN/1.73 M^2
EST. GFR  (NON AFRICAN AMERICAN): >60 ML/MIN/1.73 M^2
GLUCOSE SERPL-MCNC: 82 MG/DL (ref 70–110)
HCT VFR BLD AUTO: 47 % (ref 37–48.5)
HGB BLD-MCNC: 15.3 G/DL (ref 12–16)
IMM GRANULOCYTES # BLD AUTO: 0.03 K/UL (ref 0–0.04)
IMM GRANULOCYTES NFR BLD AUTO: 0.7 % (ref 0–0.5)
LYMPHOCYTES # BLD AUTO: 1.8 K/UL (ref 1–4.8)
LYMPHOCYTES NFR BLD: 38.8 % (ref 18–48)
MCH RBC QN AUTO: 33.1 PG (ref 27–31)
MCHC RBC AUTO-ENTMCNC: 32.6 G/DL (ref 32–36)
MCV RBC AUTO: 102 FL (ref 82–98)
MONOCYTES # BLD AUTO: 0.6 K/UL (ref 0.3–1)
MONOCYTES NFR BLD: 13.4 % (ref 4–15)
NEUTROPHILS # BLD AUTO: 2.1 K/UL (ref 1.8–7.7)
NEUTROPHILS NFR BLD: 46 % (ref 38–73)
NRBC BLD-RTO: 0 /100 WBC
PLATELET # BLD AUTO: 143 K/UL (ref 150–450)
PMV BLD AUTO: 12.8 FL (ref 9.2–12.9)
POTASSIUM SERPL-SCNC: 4.4 MMOL/L (ref 3.5–5.1)
PROT SERPL-MCNC: 7.6 G/DL (ref 6–8.4)
RBC # BLD AUTO: 4.62 M/UL (ref 4–5.4)
SODIUM SERPL-SCNC: 142 MMOL/L (ref 136–145)
WBC # BLD AUTO: 4.56 K/UL (ref 3.9–12.7)

## 2022-02-14 PROCEDURE — 85652 RBC SED RATE AUTOMATED: CPT

## 2022-02-14 PROCEDURE — 99999 PR PBB SHADOW E&M-EST. PATIENT-LVL III: CPT | Mod: PBBFAC,,,

## 2022-02-14 PROCEDURE — 86225 DNA ANTIBODY NATIVE: CPT

## 2022-02-14 PROCEDURE — 85025 COMPLETE CBC W/AUTO DIFF WBC: CPT

## 2022-02-14 PROCEDURE — 36415 COLL VENOUS BLD VENIPUNCTURE: CPT

## 2022-02-14 PROCEDURE — 86160 COMPLEMENT ANTIGEN: CPT | Mod: 59

## 2022-02-14 PROCEDURE — 86140 C-REACTIVE PROTEIN: CPT

## 2022-02-14 PROCEDURE — 86160 COMPLEMENT ANTIGEN: CPT

## 2022-02-14 PROCEDURE — 99214 PR OFFICE/OUTPT VISIT, EST, LEVL IV, 30-39 MIN: ICD-10-PCS | Mod: S$GLB,,,

## 2022-02-14 PROCEDURE — 80053 COMPREHEN METABOLIC PANEL: CPT

## 2022-02-14 PROCEDURE — 99999 PR PBB SHADOW E&M-EST. PATIENT-LVL III: ICD-10-PCS | Mod: PBBFAC,,,

## 2022-02-14 PROCEDURE — 99214 OFFICE O/P EST MOD 30 MIN: CPT | Mod: S$GLB,,,

## 2022-02-14 NOTE — PROGRESS NOTES
Subjective:       Patient ID: Mckenna Peraza is a 46 y.o. female.    Chief Complaint: Follow-up for diffuse arthralgias and positive REAGAN    Interval History:  Patient doing well today following up from her last clinic visit on 11/24/2021.  She denies any joint swelling.  Does have some occasional cracking in her wrist with mild pain but not often or too bothersome.  She has tapered off of Prednisone.  Still on HCQ 300mg daily.  Up to date on eye exam.  Denies any new rash, fever/chills, oral/nasal ulcers or patchy alopecia.    Initial HPI [10/19/2020]:  Pt is a 46yo F who presents after being referred from her PCP to be further worked up for her diffuse arthralgias.  Patient presented to her PCP on 10/15 and was having pain in her shoulders, wrists, ankles, knees.  She stated at that time that it had been going on for about two weeks before her visit.  On 10/13 she felt things got much worse and she was having swelling of her hands.  Denies any fever, N/V, abdominal pain or rashes.  She took some naproxen and it helped a little bit but not very much.  She reports stiffness that will last up to half the day.  Tylenol has provided minimal relief.  She states she feels handicapped and is usually very active.  Patient reports that she has had bad eczema for the last 4 years, however it has been well controlled since started Dupilumab which was started at the end of April.      FamHx: Denies any family history of autoimmune illnesses that she knows of    SocHx: Denies tobacco, alcohol, drugs. Occupation: Works as  at [employer]    Review of Systems   Constitutional: Negative for fever and unexpected weight change.   HENT: Negative for mouth sores and trouble swallowing.    Eyes: Negative for redness.   Respiratory: Negative for cough and shortness of breath.    Cardiovascular: Negative for chest pain.   Gastrointestinal: Negative for constipation and diarrhea.   Genitourinary: Negative for dysuria and genital  sores.   Skin: Negative for rash.   Neurological: Negative for headaches.   Hematological: Does not bruise/bleed easily.       No skin rashes, malar rash, photosensitivity   No telangiectasias   No calcinosis   No psoriasis   No patchy alopecia   No oral and nasal ulcers   No dry eyes and dry mouth   No pleurisy or any cardiopulmonary complaints   No dysphagia,diplopia and dysphonia and muscle weakness   No n/v/d/c   No acid reflux  No raynaud's  No digital ulcers   No cytopenias   No renal issues   No blood clots   No fever,chills,night sweats,weight loss and loss of appetite   No pregnancy losses/pre term deliveries /pregnancy complications   No new onset headaches   No recurrent conjunctivitis or uveitis or scleritis or episcleritis   No chronic or bloody diarrhea with no u colitis or crohn's /inflammatory bowel disease   No vaginal or urethral  d/c/STDs/no ulcers      Objective:   Ht 5' (1.524 m)   Wt 69.2 kg (152 lb 8.9 oz)   BMI 29.79 kg/m²      Physical Exam   Constitutional: She is oriented to person, place, and time. No distress.   HENT:   Head: Normocephalic and atraumatic.   Mouth/Throat: Oropharynx is clear and moist. No oropharyngeal exudate.   Eyes: Pupils are equal, round, and reactive to light. Conjunctivae are normal. Right eye exhibits no discharge. Left eye exhibits no discharge. No scleral icterus.   Cardiovascular: Normal rate, regular rhythm and normal heart sounds.   Pulmonary/Chest: Effort normal and breath sounds normal. No respiratory distress. She has no wheezes. She has no rales.   Abdominal: Soft. She exhibits no distension. There is no abdominal tenderness. There is no rebound and no guarding.   Musculoskeletal:         General: No tenderness. Normal range of motion.      Cervical back: Normal range of motion and neck supple.   Neurological: She is alert and oriented to person, place, and time.   Skin: Skin is warm and dry. No rash noted. She is not diaphoretic. No erythema.    Psychiatric: Mood and affect normal.   Nursing note and vitals reviewed.     There is currently no information documented on the homunculus. Go to the Rheumatology activity and complete the homunculus joint exam.     10/19/2020 11/12/2020 11/24/2021   Tender (PEREZ-28) 21 / 28  0 / 28  0 / 28    Swollen (PEREZ-28) 19 / 28  3 / 28  0 / 28    Provider Global -- -- 0 mm   Patient Global -- 30 mm 0 mm   ESR 29 mm/hr 29 mm/hr 3 mm/hr   CRP 7.4 mg/L 7.4 mg/L 1.0 mg/L   PEREZ-28 (ESR) -- 3.26 (Moderate disease activity) 0.77 (Remission)   PEREZ-28 (CRP) -- 2.63 (Low disease activity) 1.21 (Remission)   CDAI Score -- -- 0         Assessment:       1. SLE (systemic lupus erythematosus related syndrome)    2. Systemic lupus erythematosus, unspecified SLE type, unspecified organ involvement status            Plan:       Problem List Items Addressed This Visit        Immunology/Multi System    Systemic lupus erythematosus     Pt with initial presentation of diffuse arthralgias  - Found to have REAGAN 1:640 and +SSA antibody as well as positive anitphospholipid antibody  - SLE (2019 EULAR/ACR): 8 [Positive REAGAN, Synovitis and APS antibody]  - We feel patient's presentation is consistent with early SLE  - Successfully tapered off of Prednisone  - DXA normal    Plan:  - Continue on HCQ 300mg daily  - Check lupus monitoring labs today  - Recommend 1000mg dietary calcium and 1000 IU Vit D daily             Other Visit Diagnoses     SLE (systemic lupus erythematosus related syndrome)    -  Primary    Relevant Orders    Protein/Creatinine Ratio, Urine    Anti-DNA Ab, Double-Stranded                RTC in 4 months    Patient discussed with Dr. Hilary Escamilla MD, PGY-5  [facility]

## 2022-02-14 NOTE — PROGRESS NOTES
I have personally taken the history and examined the patient and concur with the resident's note as above.  She has just finished her prednisone.  She has no evidence of active SLE at this time.  We will repeat her laboratory studies.  She will continue hydroxychloroquine.

## 2022-02-14 NOTE — ASSESSMENT & PLAN NOTE
Pt with initial presentation of diffuse arthralgias  - Found to have REAGAN 1:640 and +SSA antibody as well as positive anitphospholipid antibody  - SLE (2019 EULAR/ACR): 8 [Positive REAGAN, Synovitis and APS antibody]  - We feel patient's presentation is consistent with early SLE  - Successfully tapered off of Prednisone  - DXA normal    Plan:  - Continue on HCQ 300mg daily  - Check lupus monitoring labs today  - Recommend 1000mg dietary calcium and 1000 IU Vit D daily

## 2022-02-15 LAB — DSDNA AB SER-ACNC: NORMAL [IU]/ML

## 2022-03-22 ENCOUNTER — PATIENT MESSAGE (OUTPATIENT)
Dept: ADMINISTRATIVE | Facility: HOSPITAL | Age: 47
End: 2022-03-22
Payer: COMMERCIAL

## 2022-04-04 ENCOUNTER — PATIENT MESSAGE (OUTPATIENT)
Dept: ADMINISTRATIVE | Facility: HOSPITAL | Age: 47
End: 2022-04-04
Payer: COMMERCIAL

## 2022-04-04 DIAGNOSIS — Z12.11 SCREENING FOR COLON CANCER: ICD-10-CM

## 2022-04-12 ENCOUNTER — OFFICE VISIT (OUTPATIENT)
Dept: INTERNAL MEDICINE | Facility: CLINIC | Age: 47
End: 2022-04-12
Payer: COMMERCIAL

## 2022-04-12 VITALS
BODY MASS INDEX: 28.53 KG/M2 | HEART RATE: 77 BPM | TEMPERATURE: 99 F | HEIGHT: 60 IN | SYSTOLIC BLOOD PRESSURE: 122 MMHG | WEIGHT: 145.31 LBS | OXYGEN SATURATION: 99 % | DIASTOLIC BLOOD PRESSURE: 62 MMHG

## 2022-04-12 DIAGNOSIS — Z12.12 SCREENING FOR COLORECTAL CANCER: ICD-10-CM

## 2022-04-12 DIAGNOSIS — M25.511 CHRONIC RIGHT SHOULDER PAIN: ICD-10-CM

## 2022-04-12 DIAGNOSIS — D50.0 IRON DEFICIENCY ANEMIA DUE TO CHRONIC BLOOD LOSS: ICD-10-CM

## 2022-04-12 DIAGNOSIS — Z00.00 WELLNESS EXAMINATION: Primary | ICD-10-CM

## 2022-04-12 DIAGNOSIS — Z12.11 SCREENING FOR COLORECTAL CANCER: ICD-10-CM

## 2022-04-12 DIAGNOSIS — M32.9 SYSTEMIC LUPUS ERYTHEMATOSUS, UNSPECIFIED SLE TYPE, UNSPECIFIED ORGAN INVOLVEMENT STATUS: ICD-10-CM

## 2022-04-12 DIAGNOSIS — M53.3 PAIN OF LEFT SACROILIAC JOINT: ICD-10-CM

## 2022-04-12 DIAGNOSIS — G89.29 CHRONIC RIGHT SHOULDER PAIN: ICD-10-CM

## 2022-04-12 PROBLEM — M25.552 LEFT HIP PAIN: Status: ACTIVE | Noted: 2022-04-12

## 2022-04-12 PROCEDURE — 1159F MED LIST DOCD IN RCRD: CPT | Mod: CPTII,S$GLB,, | Performed by: INTERNAL MEDICINE

## 2022-04-12 PROCEDURE — 3078F PR MOST RECENT DIASTOLIC BLOOD PRESSURE < 80 MM HG: ICD-10-PCS | Mod: CPTII,S$GLB,, | Performed by: INTERNAL MEDICINE

## 2022-04-12 PROCEDURE — 3008F PR BODY MASS INDEX (BMI) DOCUMENTED: ICD-10-PCS | Mod: CPTII,S$GLB,, | Performed by: INTERNAL MEDICINE

## 2022-04-12 PROCEDURE — 3074F SYST BP LT 130 MM HG: CPT | Mod: CPTII,S$GLB,, | Performed by: INTERNAL MEDICINE

## 2022-04-12 PROCEDURE — 3008F BODY MASS INDEX DOCD: CPT | Mod: CPTII,S$GLB,, | Performed by: INTERNAL MEDICINE

## 2022-04-12 PROCEDURE — 3078F DIAST BP <80 MM HG: CPT | Mod: CPTII,S$GLB,, | Performed by: INTERNAL MEDICINE

## 2022-04-12 PROCEDURE — 99999 PR PBB SHADOW E&M-EST. PATIENT-LVL IV: ICD-10-PCS | Mod: PBBFAC,,, | Performed by: INTERNAL MEDICINE

## 2022-04-12 PROCEDURE — 99999 PR PBB SHADOW E&M-EST. PATIENT-LVL IV: CPT | Mod: PBBFAC,,, | Performed by: INTERNAL MEDICINE

## 2022-04-12 PROCEDURE — 99396 PR PREVENTIVE VISIT,EST,40-64: ICD-10-PCS | Mod: S$GLB,,, | Performed by: INTERNAL MEDICINE

## 2022-04-12 PROCEDURE — 99396 PREV VISIT EST AGE 40-64: CPT | Mod: S$GLB,,, | Performed by: INTERNAL MEDICINE

## 2022-04-12 PROCEDURE — 3074F PR MOST RECENT SYSTOLIC BLOOD PRESSURE < 130 MM HG: ICD-10-PCS | Mod: CPTII,S$GLB,, | Performed by: INTERNAL MEDICINE

## 2022-04-12 PROCEDURE — 1159F PR MEDICATION LIST DOCUMENTED IN MEDICAL RECORD: ICD-10-PCS | Mod: CPTII,S$GLB,, | Performed by: INTERNAL MEDICINE

## 2022-04-12 RX ORDER — CYCLOBENZAPRINE HCL 10 MG
10 TABLET ORAL NIGHTLY PRN
Qty: 45 TABLET | Refills: 1 | Status: SHIPPED | OUTPATIENT
Start: 2022-04-12 | End: 2022-07-12

## 2022-04-12 NOTE — PROGRESS NOTES
[facility] Note    Chief Complaint      Chief Complaint   Patient presents with    Annual Exam     History of Present Illness      Mckenna Peraza is a 46 y.o. female who presents today for chief complaint annual wellness. Patient previously seen by me    PCP: Faby Zambrano MD  Patient comes to appointment alone.     HPI   Saw Rheum 22 for SLE off prednsione and still on hydrooxychloroquine,   Saw Dr Vimal CARRION in Dec for PROSPER still with low ferritin but iron is imporved, s/p IV Fe x4 and IUD placement   Last seen by me in Oct for follow up   CBC CMP 2022, last lipids 10.20  Pap: 10..20 Tila Estevez   MMG: 10.21  Colonoscopy: will order cologuard       Active Problem List with Overview Notes    Diagnosis Date Noted    Chronic right shoulder pain 2022    Pain of left sacroiliac joint 2022    Menorrhagia with regular cycle 2020    Ventral hernia without obstruction or gangrene 2020    Systemic lupus erythematosus 2020     Pt with initial presentation of diffuse arthralgias  - Found to have REAGAN 1:640 and +SSA antibody as well as positive anitphospholipid antibody  - SLE (2019 EULAR/ACR): 8 [Positive REAGAN, Synovitis and APS antibody]  - We feel patient's presentation is consistent with early SLE      Iron deficiency anemia due to chronic blood loss 10/27/2020    Eczema 10/27/2020    Myalgia 10/20/2020    Multiple joint pain 10/15/2020       Health Maintenance   Topic Date Due    TETANUS VACCINE  Never done    Mammogram  10/25/2022    Lipid Panel  2027    Hepatitis C Screening  Completed       Past Medical History:   Diagnosis Date    Abnormal Pap smear of cervix        Past Surgical History:   Procedure Laterality Date    CERVICAL BIOPSY  W/ LOOP ELECTRODE EXCISION       SECTION      five    HERNIA REPAIR      LEFT OOPHORECTOMY         family history includes Diabetes in her brother, brother, and mother; Heart disease in  her mother.    Social History     Tobacco Use    Smoking status: Never Smoker    Smokeless tobacco: Never Used   Substance Use Topics    Alcohol use: Never    Drug use: Never       Review of Systems   Constitutional: Negative for chills, fever, malaise/fatigue and weight loss.   Respiratory: Negative for cough, sputum production, shortness of breath and wheezing.    Cardiovascular: Negative for chest pain, palpitations, orthopnea and leg swelling.   Gastrointestinal: Negative for abdominal pain, constipation, diarrhea, nausea and vomiting.   Genitourinary: Negative for dysuria, frequency, hematuria and urgency.   Musculoskeletal: Positive for back pain and joint pain.   Neurological: Negative for tingling, weakness and headaches.        Outpatient Encounter Medications as of 4/12/2022   Medication Sig Note Dispense Refill    clobetasoL (TEMOVATE) 0.05 % external solution Apply topically once daily.       hydrOXYchloroQUINE (PLAQUENIL) 200 mg tablet Take 1 and 1/2 tablets (300 mg total) by mouth once daily.  135 tablet 0    hydrOXYzine HCL (ATARAX) 10 MG Tab Take 10 mg by mouth every 6 (six) hours as needed. 2/14/2022: prn      triamcinolone acetonide 0.1% (KENALOG) 0.1 % ointment Apply 1 application topically 2 (two) times daily.       cyclobenzaprine (FLEXERIL) 10 MG tablet Take 1 tablet (10 mg total) by mouth nightly as needed for Muscle spasms (low back pain).  45 tablet 1    ferrous sulfate (FEOSOL) 325 mg (65 mg iron) Tab tablet Take 1 tablet by mouth 2 (two) times daily.        Facility-Administered Encounter Medications as of 4/12/2022   Medication Dose Route Frequency Provider Last Rate Last Admin    levonorgestreL 20 mcg/24 hours (5 yrs) 52 mg IUD 1 Intra Uterine Device  1 each Intrauterine 1 time in Clinic/HOD Tila Estevez MD            Review of patient's allergies indicates:  No Known Allergies        Physical Exam      Vital Signs  Temp: 98.6 °F (37 °C)  Temp src: Oral  Pulse: 77  SpO2:  99 %  BP: 122/62  BP Location: Left arm  Patient Position: Sitting  Pain Score: 0-No pain  Height and Weight  Height: 5' (152.4 cm)  Weight: 65.9 kg (145 lb 4.5 oz)  BSA (Calculated - sq m): 1.67 sq meters  BMI (Calculated): 28.4  Weight in (lb) to have BMI = 25: 127.7]    Physical Exam  Vitals reviewed.   Constitutional:       General: She is not in acute distress.     Appearance: She is well-developed. She is not diaphoretic.   HENT:      Head: Normocephalic and atraumatic.      Right Ear: External ear normal.      Left Ear: External ear normal.      Nose: Nose normal.   Eyes:      General:         Right eye: No discharge.         Left eye: No discharge.      Conjunctiva/sclera: Conjunctivae normal.   Cardiovascular:      Rate and Rhythm: Normal rate and regular rhythm.      Heart sounds: Normal heart sounds.   Pulmonary:      Effort: Pulmonary effort is normal. No respiratory distress.      Breath sounds: Normal breath sounds.   Abdominal:      Palpations: Abdomen is soft.   Musculoskeletal:         General: No swelling or tenderness. Normal range of motion.      Cervical back: Normal range of motion.      Comments: As per plan    Skin:     Coloration: Skin is not pale.      Findings: No rash.   Neurological:      Mental Status: She is alert and oriented to person, place, and time.   Psychiatric:         Behavior: Behavior normal.         Thought Content: Thought content normal.         Judgment: Judgment normal.          Laboratory:  CBC:  Recent Labs   Lab Result Units 02/14/22  1132   WBC K/uL 4.56   RBC M/uL 4.62   Hemoglobin g/dL 15.3   Hematocrit % 47.0   Platelets K/uL 143*   MCV fL 102*   MCH pg 33.1*   MCHC g/dL 32.6     CMP:  Recent Labs   Lab Result Units 02/14/22  1132   Glucose mg/dL 82   Calcium mg/dL 9.6   Albumin g/dL 4.2   Total Protein g/dL 7.6   Sodium mmol/L 142   Potassium mmol/L 4.4   CO2 mmol/L 30*   Chloride mmol/L 105   BUN mg/dL 13   Alkaline Phosphatase U/L 50*   ALT U/L 29   AST U/L 19    Total Bilirubin mg/dL 0.4     URINALYSIS:  Recent Labs   Lab Result Units 02/14/22  1108   Color, UA  Straw   Specific [place], UA  1.005   pH, UA  8.0   Protein, UA  Negative   Nitrite, UA  Negative   Leukocytes, UA  Negative      LIPIDS:  Recent Labs   Lab Result Units 04/14/22  0957   HDL mg/dL 41   Cholesterol mg/dL 163   Triglycerides mg/dL 62   LDL Cholesterol mg/dL 109.6   HDL/Cholesterol Ratio % 25.2   Non-HDL Cholesterol mg/dL 122   Total Cholesterol/HDL Ratio  4.0         Assessment/Plan     Mckenna Peraza is a 46 y.o.female with:  Wellness  Cologuard, flp, Otherwise age related vaccines and screenings up to date      Chronic right shoulder pain  Pain on posterior rotation, cant do weights, is intermittent but sometime limits activity, doing some hoem exercise and resistance bands but that is not helping, not using OTC pain meds     Pain of left sacroiliac joint  With sicatica, worse with menstrual cycle, since preoganncy   Will do strecth, prn flexeril, prn nsaids     Systemic lupus erythematosus  Per rheum     Iron deficiency anemia due to chronic blood loss  Improved, continue monitoring and follow up with Heme Onc       Orders Placed This Encounter   Procedures    Cologuard Screening (Multitarget Stool DNA)     Standing Status:   Future     Number of Occurrences:   1     Standing Expiration Date:   6/11/2023    Lipid Panel     Standing Status:   Future     Number of Occurrences:   1     Standing Expiration Date:   6/11/2023    Ambulatory referral/consult to Orthopedics     Standing Status:   Future     Standing Expiration Date:   5/12/2023     Referral Priority:   Routine     Referral Type:   Consultation     Requested Specialty:   Orthopedic Surgery     Number of Visits Requested:   1       Use of the [facility] Patient Portal discussed and encouraged during today's visit  -Continue current medications and maintain follow up with specialists.  Return to clinic in 6 months.  Future Appointments    Date Time Provider Department Center   5/9/2022  8:00 AM Harshil Escamilla MD [facility] [address]   6/1/2022  1:30 PM Ramiro Fish Jr., MD [facility] [place]   6/14/2022  1:30 PM Tila Estevez MD [facility]   6/28/2022  8:30 AM LAB, [facility] [place]   6/29/2022  3:00 PM Len Celis MD [facility]   10/28/2022  1:00 PM [facility] [place]       Faby Zambrano MD  4/21/2022 11:58 AM    [facility]

## 2022-04-12 NOTE — ASSESSMENT & PLAN NOTE
With sicatica, worse with menstrual cycle, since preoganncy   Will do strecth, prn flexeril, prn nsaids

## 2022-04-12 NOTE — ASSESSMENT & PLAN NOTE
Pain on posterior rotation, cant do weights, is intermittent but sometime limits activity, doing some hoem exercise and resistance bands but that is not helping, not using OTC pain meds

## 2022-04-21 LAB — HEMOCCULT STL QL IA: NEGATIVE

## 2022-05-01 DIAGNOSIS — M32.9 SYSTEMIC LUPUS ERYTHEMATOSUS, UNSPECIFIED SLE TYPE, UNSPECIFIED ORGAN INVOLVEMENT STATUS: ICD-10-CM

## 2022-05-02 RX ORDER — HYDROXYCHLOROQUINE SULFATE 200 MG/1
300 TABLET, FILM COATED ORAL DAILY
Qty: 135 TABLET | Refills: 0 | Status: SHIPPED | OUTPATIENT
Start: 2022-05-02 | End: 2022-08-03 | Stop reason: SDUPTHER

## 2022-05-04 LAB — NONINV COLON CA DNA+OCC BLD SCRN STL QL: NEGATIVE

## 2022-05-05 ENCOUNTER — PATIENT MESSAGE (OUTPATIENT)
Dept: INTERNAL MEDICINE | Facility: CLINIC | Age: 47
End: 2022-05-05
Payer: COMMERCIAL

## 2022-05-09 ENCOUNTER — PATIENT MESSAGE (OUTPATIENT)
Dept: RHEUMATOLOGY | Facility: CLINIC | Age: 47
End: 2022-05-09

## 2022-05-09 ENCOUNTER — LAB VISIT (OUTPATIENT)
Dept: LAB | Facility: HOSPITAL | Age: 47
End: 2022-05-09
Payer: COMMERCIAL

## 2022-05-09 ENCOUNTER — OFFICE VISIT (OUTPATIENT)
Dept: RHEUMATOLOGY | Facility: CLINIC | Age: 47
End: 2022-05-09
Payer: COMMERCIAL

## 2022-05-09 VITALS
TEMPERATURE: 99 F | WEIGHT: 150.81 LBS | HEIGHT: 60 IN | SYSTOLIC BLOOD PRESSURE: 110 MMHG | BODY MASS INDEX: 29.61 KG/M2 | HEART RATE: 83 BPM | DIASTOLIC BLOOD PRESSURE: 68 MMHG

## 2022-05-09 DIAGNOSIS — M32.9 SLE (SYSTEMIC LUPUS ERYTHEMATOSUS RELATED SYNDROME): Primary | ICD-10-CM

## 2022-05-09 DIAGNOSIS — M32.9 SLE (SYSTEMIC LUPUS ERYTHEMATOSUS RELATED SYNDROME): ICD-10-CM

## 2022-05-09 DIAGNOSIS — M32.9 SYSTEMIC LUPUS ERYTHEMATOSUS, UNSPECIFIED SLE TYPE, UNSPECIFIED ORGAN INVOLVEMENT STATUS: ICD-10-CM

## 2022-05-09 LAB
ALBUMIN SERPL BCP-MCNC: 3.8 G/DL (ref 3.5–5.2)
ALP SERPL-CCNC: 53 U/L (ref 55–135)
ALT SERPL W/O P-5'-P-CCNC: 25 U/L (ref 10–44)
ANION GAP SERPL CALC-SCNC: 6 MMOL/L (ref 8–16)
AST SERPL-CCNC: 18 U/L (ref 10–40)
BASOPHILS # BLD AUTO: 0.01 K/UL (ref 0–0.2)
BASOPHILS NFR BLD: 0.2 % (ref 0–1.9)
BILIRUB SERPL-MCNC: 0.4 MG/DL (ref 0.1–1)
BUN SERPL-MCNC: 9 MG/DL (ref 6–20)
C3 SERPL-MCNC: 100 MG/DL (ref 50–180)
C4 SERPL-MCNC: 26 MG/DL (ref 11–44)
CALCIUM SERPL-MCNC: 9.2 MG/DL (ref 8.7–10.5)
CHLORIDE SERPL-SCNC: 108 MMOL/L (ref 95–110)
CO2 SERPL-SCNC: 27 MMOL/L (ref 23–29)
CREAT SERPL-MCNC: 0.6 MG/DL (ref 0.5–1.4)
DIFFERENTIAL METHOD: ABNORMAL
EOSINOPHIL # BLD AUTO: 0.1 K/UL (ref 0–0.5)
EOSINOPHIL NFR BLD: 2 % (ref 0–8)
ERYTHROCYTE [DISTWIDTH] IN BLOOD BY AUTOMATED COUNT: 11.7 % (ref 11.5–14.5)
ERYTHROCYTE [SEDIMENTATION RATE] IN BLOOD BY WESTERGREN METHOD: 10 MM/HR (ref 0–36)
EST. GFR  (AFRICAN AMERICAN): >60 ML/MIN/1.73 M^2
EST. GFR  (NON AFRICAN AMERICAN): >60 ML/MIN/1.73 M^2
GLUCOSE SERPL-MCNC: 86 MG/DL (ref 70–110)
HCT VFR BLD AUTO: 41.7 % (ref 37–48.5)
HGB BLD-MCNC: 13.6 G/DL (ref 12–16)
IMM GRANULOCYTES # BLD AUTO: 0.02 K/UL (ref 0–0.04)
IMM GRANULOCYTES NFR BLD AUTO: 0.4 % (ref 0–0.5)
LYMPHOCYTES # BLD AUTO: 1.5 K/UL (ref 1–4.8)
LYMPHOCYTES NFR BLD: 32 % (ref 18–48)
MCH RBC QN AUTO: 32.5 PG (ref 27–31)
MCHC RBC AUTO-ENTMCNC: 32.6 G/DL (ref 32–36)
MCV RBC AUTO: 100 FL (ref 82–98)
MONOCYTES # BLD AUTO: 0.6 K/UL (ref 0.3–1)
MONOCYTES NFR BLD: 13.1 % (ref 4–15)
NEUTROPHILS # BLD AUTO: 2.4 K/UL (ref 1.8–7.7)
NEUTROPHILS NFR BLD: 52.3 % (ref 38–73)
NRBC BLD-RTO: 0 /100 WBC
PLATELET # BLD AUTO: 209 K/UL (ref 150–450)
PMV BLD AUTO: 12.4 FL (ref 9.2–12.9)
POTASSIUM SERPL-SCNC: 4.2 MMOL/L (ref 3.5–5.1)
PROT SERPL-MCNC: 7.2 G/DL (ref 6–8.4)
RBC # BLD AUTO: 4.18 M/UL (ref 4–5.4)
SODIUM SERPL-SCNC: 141 MMOL/L (ref 136–145)
WBC # BLD AUTO: 4.59 K/UL (ref 3.9–12.7)

## 2022-05-09 PROCEDURE — 85652 RBC SED RATE AUTOMATED: CPT

## 2022-05-09 PROCEDURE — 36415 COLL VENOUS BLD VENIPUNCTURE: CPT

## 2022-05-09 PROCEDURE — 80053 COMPREHEN METABOLIC PANEL: CPT

## 2022-05-09 PROCEDURE — 86160 COMPLEMENT ANTIGEN: CPT | Mod: 59

## 2022-05-09 PROCEDURE — 99214 PR OFFICE/OUTPT VISIT, EST, LEVL IV, 30-39 MIN: ICD-10-PCS | Mod: S$GLB,,,

## 2022-05-09 PROCEDURE — 99999 PR PBB SHADOW E&M-EST. PATIENT-LVL III: CPT | Mod: PBBFAC,,,

## 2022-05-09 PROCEDURE — 99214 OFFICE O/P EST MOD 30 MIN: CPT | Mod: S$GLB,,,

## 2022-05-09 PROCEDURE — 85025 COMPLETE CBC W/AUTO DIFF WBC: CPT

## 2022-05-09 PROCEDURE — 86225 DNA ANTIBODY NATIVE: CPT

## 2022-05-09 PROCEDURE — 99999 PR PBB SHADOW E&M-EST. PATIENT-LVL III: ICD-10-PCS | Mod: PBBFAC,,,

## 2022-05-09 PROCEDURE — 86160 COMPLEMENT ANTIGEN: CPT

## 2022-05-09 RX ORDER — PNEUMOCOCCAL 20-VALENT CONJUGATE VACCINE 2.2; 2.2; 2.2; 2.2; 2.2; 2.2; 2.2; 2.2; 2.2; 2.2; 2.2; 2.2; 2.2; 2.2; 2.2; 2.2; 4.4; 2.2; 2.2; 2.2 UG/.5ML; UG/.5ML; UG/.5ML; UG/.5ML; UG/.5ML; UG/.5ML; UG/.5ML; UG/.5ML; UG/.5ML; UG/.5ML; UG/.5ML; UG/.5ML; UG/.5ML; UG/.5ML; UG/.5ML; UG/.5ML; UG/.5ML; UG/.5ML; UG/.5ML; UG/.5ML
0.5 INJECTION, SUSPENSION INTRAMUSCULAR ONCE
Qty: 0.5 ML | Refills: 0 | Status: SHIPPED | OUTPATIENT
Start: 2022-05-09 | End: 2022-05-10

## 2022-05-09 ASSESSMENT — SYSTEMIC LUPUS ERYTHEMATOSUS DISEASE ACTIVITY INDEX (SLEDAI): TOTAL_SCORE: 0

## 2022-05-09 NOTE — PROGRESS NOTES
I have personally taken the history and examined the patient to verify the fellow's notation, and I agree with the impression and plan stated.    She has incomplete SLE that has improved with prednisone and HCQ   We will monitor while off of prednisone and cont HCQ in effort to prevent progression of SLE    Agree with pneumonia vaccination as she may required stronger immunosuppressive in the future    WD

## 2022-05-09 NOTE — PROGRESS NOTES
Subjective:       Patient ID: Mckenna Peraza is a 46 y.o. female.    Chief Complaint: Follow-up for SLE    Interval History:  Patient doing well today following up from her last clinic visit on 2/14/2022.  She denies any joint swelling.  Compliant with HCQ 300mg daily.  Up to date on eye exam (about 6 months ago).  Denies any new rash, fever/chills, oral/nasal ulcers or patchy alopecia.  Recently saw PCP who started her on Flexeril which has helped her low back pain.  Seeing Ortho soon for her shoulder.    Initial HPI [10/19/2020]:  Pt is a 46yo F who presents after being referred from her PCP to be further worked up for her diffuse arthralgias.  Patient presented to her PCP on 10/15 and was having pain in her shoulders, wrists, ankles, knees.  She stated at that time that it had been going on for about two weeks before her visit.  On 10/13 she felt things got much worse and she was having swelling of her hands.  Denies any fever, N/V, abdominal pain or rashes.  She took some naproxen and it helped a little bit but not very much.  She reports stiffness that will last up to half the day.  Tylenol has provided minimal relief.  She states she feels handicapped and is usually very active.  Patient reports that she has had bad eczema for the last 4 years, however it has been well controlled since started Dupilumab which was started at the end of April.      FamHx: Denies any family history of autoimmune illnesses that she knows of    SocHx: Denies tobacco, alcohol, drugs. Occupation: Works as  at [employer]    Review of Systems   Constitutional: Negative for fever and unexpected weight change.   HENT: Negative for mouth sores and trouble swallowing.    Eyes: Negative for redness.   Respiratory: Negative for cough and shortness of breath.    Cardiovascular: Negative for chest pain.   Gastrointestinal: Negative for constipation and diarrhea.   Genitourinary: Negative for dysuria and genital sores.   Skin: Negative  for rash.   Neurological: Negative for headaches.   Hematological: Does not bruise/bleed easily.       No skin rashes, malar rash, photosensitivity   No telangiectasias   No calcinosis   No psoriasis   No patchy alopecia   No oral and nasal ulcers   No dry eyes and dry mouth   No pleurisy or any cardiopulmonary complaints   No dysphagia,diplopia and dysphonia and muscle weakness   No n/v/d/c   No acid reflux  No raynaud's  No digital ulcers   No cytopenias   No renal issues   No blood clots   No fever,chills,night sweats,weight loss and loss of appetite   No pregnancy losses/pre term deliveries /pregnancy complications   No new onset headaches   No recurrent conjunctivitis or uveitis or scleritis or episcleritis   No chronic or bloody diarrhea with no u colitis or crohn's /inflammatory bowel disease   No vaginal or urethral  d/c/STDs/no ulcers      Objective:   /68   Pulse 83   Temp 98.8 °F (37.1 °C)   Ht 5' (1.524 m)   Wt 68.4 kg (150 lb 12.7 oz)   BMI 29.45 kg/m²      Physical Exam   Constitutional: She is oriented to person, place, and time. No distress.   HENT:   Head: Normocephalic and atraumatic.   Mouth/Throat: Oropharynx is clear and moist. No oropharyngeal exudate.   Eyes: Pupils are equal, round, and reactive to light. Conjunctivae are normal. Right eye exhibits no discharge. Left eye exhibits no discharge. No scleral icterus.   Cardiovascular: Normal rate, regular rhythm and normal heart sounds.   Pulmonary/Chest: Effort normal and breath sounds normal. No respiratory distress. She has no wheezes. She has no rales.   Abdominal: Soft. She exhibits no distension. There is no abdominal tenderness. There is no rebound and no guarding.   Musculoskeletal:         General: No tenderness. Normal range of motion.      Cervical back: Normal range of motion and neck supple.   Neurological: She is alert and oriented to person, place, and time.   Skin: Skin is warm and dry. No rash noted. She is not  diaphoretic. No erythema.   Psychiatric: Mood and affect normal.   Nursing note and vitals reviewed.     There is currently no information documented on the homunculus. Go to the Rheumatology activity and complete the homunculus joint exam.     10/19/2020 11/12/2020 11/24/2021   Tender (PEREZ-28) 21 / 28  0 / 28  0 / 28    Swollen (PEREZ-28) 19 / 28  3 / 28  0 / 28    Provider Global -- -- 0 mm   Patient Global -- 30 mm 0 mm   ESR 29 mm/hr 29 mm/hr 3 mm/hr   CRP 7.4 mg/L 7.4 mg/L 1.0 mg/L   PEREZ-28 (ESR) -- 3.26 (Moderate disease activity) 0.77 (Remission)   PEREZ-28 (CRP) -- 2.63 (Low disease activity) 1.21 (Remission)   CDAI Score -- -- 0         Assessment:       1. SLE (systemic lupus erythematosus related syndrome)    2. Systemic lupus erythematosus, unspecified SLE type, unspecified organ involvement status            Plan:       Problem List Items Addressed This Visit        Immunology/Multi System    Systemic lupus erythematosus     Pt with initial presentation of diffuse arthralgias  - Found to have REAGAN 1:640 and +SSA antibody as well as positive anitphospholipid antibody  - SLE (2019 EULAR/ACR): 8 [Positive REAGAN, Synovitis and APS antibody]  - We feel patient's presentation is consistent with early SLE  - Successfully tapered off of Prednisone now  - DXA normal    Plan:  - Continue on HCQ 300mg daily  Eye exam due in 6 months  - Check lupus monitoring labs today  - Recommend continuing 1000mg dietary calcium and 1000 IU Vit D daily  - Ordered Prevnar 20 vaccine               Other Visit Diagnoses     SLE (systemic lupus erythematosus related syndrome)    -  Primary    Relevant Medications    pneumoc 20-yvette conj-dip cr,PF, (PREVNAR 20, PF,) 0.5 mL Syrg injection    Other Relevant Orders    C3 Complement    C4 Complement    Comprehensive Metabolic Panel    CBC Auto Differential    Protein/Creatinine Ratio, Urine    Urinalysis    Sedimentation rate    Anti-DNA Ab, Double-Stranded                RTC in 4  months    Patient discussed with Dr. Rakesh Escamilla MD, PGY-5  [facility]

## 2022-05-09 NOTE — ASSESSMENT & PLAN NOTE
Pt with initial presentation of diffuse arthralgias  - Found to have REAGAN 1:640 and +SSA antibody as well as positive anitphospholipid antibody  - SLE (2019 EULAR/ACR): 8 [Positive REAGAN, Synovitis and APS antibody]  - We feel patient's presentation is consistent with early SLE  - Successfully tapered off of Prednisone now  - DXA normal    Plan:  - Continue on HCQ 300mg daily  Eye exam due in 6 months  - Check lupus monitoring labs today  - Recommend continuing 1000mg dietary calcium and 1000 IU Vit D daily  - Ordered Prevnar 20 vaccine

## 2022-05-10 LAB — DSDNA AB SER-ACNC: NORMAL [IU]/ML

## 2022-05-31 ENCOUNTER — TELEPHONE (OUTPATIENT)
Dept: ORTHOPEDICS | Facility: CLINIC | Age: 47
End: 2022-05-31
Payer: COMMERCIAL

## 2022-05-31 DIAGNOSIS — M25.519 SHOULDER PAIN, UNSPECIFIED CHRONICITY, UNSPECIFIED LATERALITY: Primary | ICD-10-CM

## 2022-06-01 ENCOUNTER — OFFICE VISIT (OUTPATIENT)
Dept: ORTHOPEDICS | Facility: CLINIC | Age: 47
End: 2022-06-01
Payer: COMMERCIAL

## 2022-06-01 VITALS — WEIGHT: 150.81 LBS | BODY MASS INDEX: 29.61 KG/M2 | HEIGHT: 60 IN

## 2022-06-01 DIAGNOSIS — M25.511 CHRONIC RIGHT SHOULDER PAIN: ICD-10-CM

## 2022-06-01 DIAGNOSIS — G89.29 CHRONIC RIGHT SHOULDER PAIN: ICD-10-CM

## 2022-06-01 PROCEDURE — 99999 PR PBB SHADOW E&M-EST. PATIENT-LVL III: ICD-10-PCS | Mod: PBBFAC,,, | Performed by: ORTHOPAEDIC SURGERY

## 2022-06-01 PROCEDURE — 99203 PR OFFICE/OUTPT VISIT, NEW, LEVL III, 30-44 MIN: ICD-10-PCS | Mod: 25,S$GLB,, | Performed by: ORTHOPAEDIC SURGERY

## 2022-06-01 PROCEDURE — 1159F MED LIST DOCD IN RCRD: CPT | Mod: CPTII,S$GLB,, | Performed by: ORTHOPAEDIC SURGERY

## 2022-06-01 PROCEDURE — 3008F PR BODY MASS INDEX (BMI) DOCUMENTED: ICD-10-PCS | Mod: CPTII,S$GLB,, | Performed by: ORTHOPAEDIC SURGERY

## 2022-06-01 PROCEDURE — 99999 PR PBB SHADOW E&M-EST. PATIENT-LVL III: CPT | Mod: PBBFAC,,, | Performed by: ORTHOPAEDIC SURGERY

## 2022-06-01 PROCEDURE — 1159F PR MEDICATION LIST DOCUMENTED IN MEDICAL RECORD: ICD-10-PCS | Mod: CPTII,S$GLB,, | Performed by: ORTHOPAEDIC SURGERY

## 2022-06-01 PROCEDURE — 20610 DRAIN/INJ JOINT/BURSA W/O US: CPT | Mod: RT,S$GLB,, | Performed by: ORTHOPAEDIC SURGERY

## 2022-06-01 PROCEDURE — 3008F BODY MASS INDEX DOCD: CPT | Mod: CPTII,S$GLB,, | Performed by: ORTHOPAEDIC SURGERY

## 2022-06-01 PROCEDURE — 99203 OFFICE O/P NEW LOW 30 MIN: CPT | Mod: 25,S$GLB,, | Performed by: ORTHOPAEDIC SURGERY

## 2022-06-01 PROCEDURE — 20610 PR DRAIN/INJECT LARGE JOINT/BURSA: ICD-10-PCS | Mod: RT,S$GLB,, | Performed by: ORTHOPAEDIC SURGERY

## 2022-06-01 RX ORDER — TRIAMCINOLONE ACETONIDE 40 MG/ML
40 INJECTION, SUSPENSION INTRA-ARTICULAR; INTRAMUSCULAR
Status: COMPLETED | OUTPATIENT
Start: 2022-06-01 | End: 2022-06-01

## 2022-06-01 RX ADMIN — TRIAMCINOLONE ACETONIDE 40 MG: 40 INJECTION, SUSPENSION INTRA-ARTICULAR; INTRAMUSCULAR at 01:06

## 2022-06-01 NOTE — PROGRESS NOTES
Subjective:      Patient ID: Mkcenna Peraza is a 46 y.o. female.    Chief Complaint: Consult and Shoulder Pain (right )      HPI  Mckenna Peraza is a  46 y.o. female presenting today for right shoulder pain.  There was not a history of trauma.  Onset of symptoms began several months ago  Symptoms may have been brought on by working out in the gym or activities at work  She is having pain with overhead lifting  No numbness or tingling reported  .      Review of patient's allergies indicates:  No Known Allergies      Current Outpatient Medications   Medication Sig Dispense Refill    clobetasoL (TEMOVATE) 0.05 % external solution Apply topically once daily.      hydrOXYchloroQUINE (PLAQUENIL) 200 mg tablet Take 1 and 1/2 tablets (300 mg total) by mouth once daily. 135 tablet 0    hydrOXYzine HCL (ATARAX) 10 MG Tab Take 10 mg by mouth every 6 (six) hours as needed.      cyclobenzaprine (FLEXERIL) 10 MG tablet Take 1 tablet (10 mg total) by mouth nightly as needed for Muscle spasms (low back pain). (Patient not taking: Reported on 2022) 45 tablet 1    ferrous sulfate (FEOSOL) 325 mg (65 mg iron) Tab tablet Take 1 tablet by mouth 2 (two) times daily.      triamcinolone acetonide 0.1% (KENALOG) 0.1 % ointment Apply 1 application topically 2 (two) times daily.       Current Facility-Administered Medications   Medication Dose Route Frequency Provider Last Rate Last Admin    levonorgestreL 20 mcg/24 hours (5 yrs) 52 mg IUD 1 Intra Uterine Device  1 each Intrauterine 1 time in Clinic/HOD Tila Estevez MD           Past Medical History:   Diagnosis Date    Abnormal Pap smear of cervix        Past Surgical History:   Procedure Laterality Date    CERVICAL BIOPSY  W/ LOOP ELECTRODE EXCISION       SECTION      five    HERNIA REPAIR      LEFT OOPHORECTOMY         Review of Systems:  ROS    OBJECTIVE:     PHYSICAL EXAM:  Height: 5' (152.4 cm) Weight: 68.4 kg (150 lb 12.7 oz)  Vitals:    22 1313    Weight: 68.4 kg (150 lb 12.7 oz)   Height: 5' (1.524 m)   PainSc:   3   PainLoc: Shoulder     Well developed, well nourished female in no acute distress  Alert and oriented x 3  HEENT- Normal exam  Lungs- Clear to auscultation  Heart- Regular rate and rhythm  Abdomen- Soft nontender  Extremity exam- examination right shoulder mild tenderness anteriorly over the biceps tendon  Range of motion right shoulder full  Mildly positive impingement sign  Strength intact  Neurologic exam normal      RADIOGRAPHS:  AP lateral x-ray bilateral shoulders demonstrate no significant abnormalities  Comments: I have personally reviewed the imaging and I agree with the above radiologist's report.    ASSESSMENT/PLAN:     IMPRESSION:  Right shoulder impingement bursitis    PLAN:  I explained the nature of the problem to the patient  Recommended injection today  After pause for time-out identified the right shoulder injected with Kenalog 40 mg 2 cc xylocaine sterile technique  Tolerated the procedure well without complication  Recommend she start on leave twice a day with food avoid overhead lifting especially in the gym.    Follow-up 4-6 weeks if symptoms persist we may get an MRI scan right shoulder       - We talked at length about the anatomy and pathophysiology of   Encounter Diagnosis   Name Primary?    Chronic right shoulder pain            Disclaimer: This note has been generated using voice-recognition software. There may be typographical errors that have been missed during proof-reading.

## 2022-06-14 ENCOUNTER — OFFICE VISIT (OUTPATIENT)
Dept: OBSTETRICS AND GYNECOLOGY | Facility: CLINIC | Age: 47
End: 2022-06-14
Payer: COMMERCIAL

## 2022-06-14 VITALS
BODY MASS INDEX: 27.52 KG/M2 | WEIGHT: 145.75 LBS | HEIGHT: 61 IN | DIASTOLIC BLOOD PRESSURE: 71 MMHG | SYSTOLIC BLOOD PRESSURE: 120 MMHG

## 2022-06-14 DIAGNOSIS — Z01.419 ENCOUNTER FOR ANNUAL ROUTINE GYNECOLOGICAL EXAMINATION: Primary | ICD-10-CM

## 2022-06-14 DIAGNOSIS — Z30.432 ENCOUNTER FOR REMOVAL OF INTRAUTERINE CONTRACEPTIVE DEVICE (IUD): ICD-10-CM

## 2022-06-14 DIAGNOSIS — Z12.31 BREAST CANCER SCREENING BY MAMMOGRAM: ICD-10-CM

## 2022-06-14 PROCEDURE — 3078F PR MOST RECENT DIASTOLIC BLOOD PRESSURE < 80 MM HG: ICD-10-PCS | Mod: CPTII,S$GLB,, | Performed by: OBSTETRICS & GYNECOLOGY

## 2022-06-14 PROCEDURE — 3078F DIAST BP <80 MM HG: CPT | Mod: CPTII,S$GLB,, | Performed by: OBSTETRICS & GYNECOLOGY

## 2022-06-14 PROCEDURE — 3008F PR BODY MASS INDEX (BMI) DOCUMENTED: ICD-10-PCS | Mod: CPTII,S$GLB,, | Performed by: OBSTETRICS & GYNECOLOGY

## 2022-06-14 PROCEDURE — 3074F PR MOST RECENT SYSTOLIC BLOOD PRESSURE < 130 MM HG: ICD-10-PCS | Mod: CPTII,S$GLB,, | Performed by: OBSTETRICS & GYNECOLOGY

## 2022-06-14 PROCEDURE — 99396 PREV VISIT EST AGE 40-64: CPT | Mod: S$GLB,,, | Performed by: OBSTETRICS & GYNECOLOGY

## 2022-06-14 PROCEDURE — 99396 PR PREVENTIVE VISIT,EST,40-64: ICD-10-PCS | Mod: S$GLB,,, | Performed by: OBSTETRICS & GYNECOLOGY

## 2022-06-14 PROCEDURE — 1159F MED LIST DOCD IN RCRD: CPT | Mod: CPTII,S$GLB,, | Performed by: OBSTETRICS & GYNECOLOGY

## 2022-06-14 PROCEDURE — 3074F SYST BP LT 130 MM HG: CPT | Mod: CPTII,S$GLB,, | Performed by: OBSTETRICS & GYNECOLOGY

## 2022-06-14 PROCEDURE — 1159F PR MEDICATION LIST DOCUMENTED IN MEDICAL RECORD: ICD-10-PCS | Mod: CPTII,S$GLB,, | Performed by: OBSTETRICS & GYNECOLOGY

## 2022-06-14 PROCEDURE — 3008F BODY MASS INDEX DOCD: CPT | Mod: CPTII,S$GLB,, | Performed by: OBSTETRICS & GYNECOLOGY

## 2022-06-14 NOTE — PROGRESS NOTES
Chief Complaint: Well Woman Exam     HPI:      Mckenna Peraza is a 46 y.o.  who presents for annual exam. She is currently complaining of worsening PMS since Mirena IUD was placed in 2020.  Desires removal today.    Ms. Peraza is currently sexually active with a single male partner. She declines STD screening today. Patient does not have regular monthly menses. Reports spotting every 2 weeks. No LMP recorded. Patient has had an implant. She is currently using bilateral tubal ligation for contraception.    Previous Pap:  no abnormalities (2020)  Previous Mammogram:   Results for orders placed during the hospital encounter of 10/25/21    Mammo Digital Screening Bilat w/ Paul    Narrative  Result:  Mammo Digital Screening Bilat w/ Paul    History:  Patient is 46 y.o. and is seen for a screening mammogram.      Films Compared:  Compared to: 10/20/2020 Mammo Digital Screening Bilat w/ Paul    Findings:  This procedure was performed using tomosynthesis.  Computer-aided detection was utilized in the interpretation of this examination.    The breasts are heterogeneously dense, which may obscure small masses. There is no evidence of suspicious masses, microcalcifications or architectural distortion.    Impression  No mammographic evidence of malignancy.    BI-RADS Category 1: Negative    Recommendation:  Routine screening mammogram in 1 year is recommended.    Your estimated lifetime risk of breast cancer (to age 85) based on Tyrer-Cuzick risk assessment model is Tyrer-Cuzick: 6.69 %. According to the American Cancer Society, patients with a lifetime breast cancer risk of 20% or higher might benefit from supplemental screening tests.     Most Recent Dexa: 2021 - normal  Colonoscopy:     COVID: Complete  Gardasil:Has never had     Patient Active Problem List   Diagnosis    Multiple joint pain    Myalgia    Iron deficiency anemia due to chronic blood loss    Eczema    Systemic lupus  "erythematosus    Ventral hernia without obstruction or gangrene    Menorrhagia with regular cycle    Chronic right shoulder pain    Pain of left sacroiliac joint       Past Medical History:   Diagnosis Date    Abnormal Pap smear of cervix        Past Surgical History:   Procedure Laterality Date    CERVICAL BIOPSY  W/ LOOP ELECTRODE EXCISION       SECTION      five    HERNIA REPAIR      LEFT OOPHORECTOMY         OB History        5    Para   5    Term   5            AB        Living   5       SAB        IAB        Ectopic        Multiple        Live Births               Obstetric Comments   14/R/4-5  Hx abnormal pap, s/p LEEP 2011  Hx chlamydia  CD x 5, BTL             ROS:     Review of Systems   Constitutional: Negative for activity change, appetite change and fatigue.   Respiratory: Negative for shortness of breath.    Cardiovascular: Negative for chest pain.   Gastrointestinal: Negative for abdominal pain, constipation and diarrhea.   Endocrine: Negative for cold intolerance and heat intolerance.   Genitourinary: Negative for dysuria, frequency, menstrual irregularity, pelvic pain and vaginal discharge.   Integumentary:  Negative for breast mass, breast discharge and breast tenderness.   Psychiatric/Behavioral: Negative for dysphoric mood. The patient is not nervous/anxious.    Breast: Negative for mass and tenderness      Physical Exam:      PHYSICAL EXAM:  /71   Ht 5' 1" (1.549 m)   Wt 66.1 kg (145 lb 11.6 oz)   BMI 27.53 kg/m²   Body mass index is 27.53 kg/m².     APPEARANCE: Well nourished, well developed, in no acute distress.  PSYCH: Appropriate mood and affect.  SKIN: No acne or hirsutism  NECK: Neck symmetric without masses or thyromegaly  NODES: No inguinal, axillary, or supraclavicular lymph node enlargement  CHEST: Normal respiratory effort.  ABDOMEN: Soft.  No tenderness or masses.   BREASTS: Symmetrical, no skin changes or visible lesions.  No palpable masses " or nipple discharge bilaterally.  PELVIC: Normal external genitalia without lesions.  Normal hair distribution.  Adequate perineal body, normal urethral meatus.  Vagina moist and well rugated without lesions or discharge.  Cervix pink, without lesions, discharge or tenderness.  No significant cystocele or rectocele.  Bimanual exam shows uterus to be normal size, regular, mobile and nontender.  Adnexa without masses or tenderness.    EXTREMITIES: No edema.    IUD Removal:   After verbal consent was obtained, speculum was placed. Ring forceps were used to grasp the strings and the IUD was removed without difficulty. The patient tolerated the procedure well. Speculum removed.       Assessment:     1. Encounter for annual routine gynecological examination     2. Encounter for removal of intrauterine contraceptive device (IUD)     3. Breast cancer screening by mammogram           Plan:     1. Clinical breast exam performed.  2. Pap UTD.  3. Mammogram UTD.  4. DEXA UTD.  5. Colonoscopy UTD.  6. IUD removal. Patient advised to notify clinic of heavy menses.  Follow up in about 1 year (around 6/14/2023) for annual well woman exam or as needed.    Counseling:     Patient was counseled today on current ASCCP pap guidelines, the recommendation for yearly pelvic exams, healthy diet and exercise routines, breast self awareness and annual mammograms. She is to see her PCP for other health maintenance.       Use of the [employer] Patient Portal discussed and encouraged during today's visit.         Tila Estevez MD  [facility]  06/14/2022

## 2022-06-14 NOTE — PATIENT INSTRUCTIONS
Please check out the American College of Obst[employer]ians [employer]necologists PATIENT WEBSITE.  The site has education materials, patient stories, expert views, and a portal for you to ask questions.       [email or web address]      As always, please let me know if you have any questions.     Dr. Tila Estevez

## 2022-06-28 NOTE — PROGRESS NOTES
"PATIENT: Mckenna Peraza  MRN: [id number]  DATE: 6/29/2022    Diagnosis:   1. Iron deficiency anemia due to chronic blood loss    2. Menorrhagia with regular cycle      Chief Complaint: iron deficiency anemia    Subjective:    History of Present Illness: Ms. Peraza is a 46 y.o. female who presented in October 2020 for evaluation and management of iron deficiency anemia. She was referred by Dr. Zambrano.    Telemedicine visit:  The patient location is: home  The chief complaint leading to consultation is: iron deficiency anemia.    Visit type: audiovisual    Face to Face time with patient: 5 minutes  10 minutes of total time spent on the encounter, which includes face to face time and non-face to face time preparing to see the patient (eg, review of tests), Obtaining and/or reviewing separately obtained history, Documenting clinical information in the electronic or other health record, Independently interpreting results (not separately reported) and communicating results to the patient/family/caregiver, or Care coordination (not separately reported).     Each patient to whom he or she provides medical services by telemedicine is:  (1) informed of the relationship between the physician and patient and the respective role of any other health care provider with respect to management of the patient; and (2) notified that he or she may decline to receive medical services by telemedicine and may withdraw from such care at any time.    Notes: see below      - labs in October 2020 reveal a microcytic anemia with intermittent thrombocytosis.  - iron studies (10/19/20) reveal a decreased ferritin/iron/saturated iron.  - she received four doses of iron sucrose in November 2020    Interval history:  - she presents for a follow-up appointment for her iron deficiency anemia.  - she had the IUD removed a few weeks ago. She reports not having periods but having "PMS symptoms" that were frustrating her, prompting the removal of the " IUD. Her periods have resumed.  - She denies shortness of breath, chest pain, nausea, vomiting, diarrhea, constipation.        Past medical, surgical, family, and social histories have been reviewed and updated below.    Past Medical History:   Past Medical History:   Diagnosis Date    Abnormal Pap smear of cervix        Past Surgical History:   Past Surgical History:   Procedure Laterality Date    CERVICAL BIOPSY  W/ LOOP ELECTRODE EXCISION       SECTION      five    HERNIA REPAIR      LEFT OOPHORECTOMY         Family History:   Family History   Problem Relation Age of Onset    Diabetes Mother     Heart disease Mother     Diabetes Brother     Diabetes Brother     Breast cancer Neg Hx     Ovarian cancer Neg Hx     Colon cancer Neg Hx     Endometrial cancer Neg Hx        Social History:  reports that she has never smoked. She has never used smokeless tobacco. She reports that she does not drink alcohol and does not use drugs.    Allergies:  Review of patient's allergies indicates:  No Known Allergies    Medications:  Current Outpatient Medications   Medication Sig Dispense Refill    clobetasoL (TEMOVATE) 0.05 % external solution Apply topically once daily.      cyclobenzaprine (FLEXERIL) 10 MG tablet Take 1 tablet (10 mg total) by mouth nightly as needed for Muscle spasms (low back pain). 45 tablet 1    ferrous sulfate (FEOSOL) 325 mg (65 mg iron) Tab tablet Take 1 tablet by mouth 2 (two) times daily.      hydrOXYchloroQUINE (PLAQUENIL) 200 mg tablet Take 1 and 1/2 tablets (300 mg total) by mouth once daily. 135 tablet 0    hydrOXYzine HCL (ATARAX) 10 MG Tab Take 10 mg by mouth every 6 (six) hours as needed.      triamcinolone acetonide 0.1% (KENALOG) 0.1 % ointment Apply 1 application topically 2 (two) times daily.       Current Facility-Administered Medications   Medication Dose Route Frequency Provider Last Rate Last Admin    levonorgestreL 20 mcg/24 hours (5 yrs) 52 mg IUD 1 Intra  "Uterine Device  1 each Intrauterine 1 time in Clinic/HOD Tila Estevez MD           Review of Systems   Constitutional: Negative for fatigue.   HENT: Negative for sore throat.    Eyes: Negative for visual disturbance.   Respiratory: Negative for cough and shortness of breath.    Cardiovascular: Negative for chest pain.   Gastrointestinal: Negative for abdominal pain, constipation, diarrhea, nausea and vomiting.   Genitourinary: Negative for dysuria and menstrual problem.   Musculoskeletal: Negative for back pain.   Skin: Negative for rash.   Neurological: Negative for headaches.   Hematological: Negative for adenopathy.   Psychiatric/Behavioral: The patient is not nervous/anxious.        ECOG Performance Status:   ECOG SCORE 0       Objective:      Vitals: There were no vitals filed for this visit.  BMI: There is no height or weight on file to calculate BMI.  Deferred since telemedicine visit    Physical Exam  Deferred since telemedicine visit    Laboratory Data:  Labs have been reviewed.    Lab Results   Component Value Date    WBC 4.51 06/28/2022    HGB 13.7 06/28/2022    HCT 41.3 06/28/2022    MCV 99 (H) 06/28/2022     06/28/2022         Imaging:    Assessment:       1. Iron deficiency anemia due to chronic blood loss    2. Menorrhagia with regular cycle           Plan:     1. Iron deficiency anemia due to chronic blood loss / reactive thrombocytosis  - I have reviewed her chart.  - labs in October 2020 reveal a microcytic anemia with intermittent thrombocytosis.  - iron studies (10/19/20) reveal a decreased ferritin/iron/saturated iron.  - given the severity of her iron deficiency anemia, I recommended iron sucrose weekly x 4 doses.  - she received four doses of iron sucrose in November 2020  - she had the IUD removed a few weeks ago. She reports not having periods but having "PMS symptoms" that were frustrating her, prompting the removal of the IUD.  - Labs have been reviewed. Iron studies are " "stable.  - since her periods have resumed, we will continue to monitor labs every 6 months.  - return to clinic in 6 months with repeat labs.     2. Menorrhagia with regular cycles.  - the cause of her iron deficiency anemia.  - she had the IUD removed a few weeks ago. She reports not having periods but having "PMS symptoms" that were frustrating her, prompting the removal of the IUD.  - since her periods have resumed, we will continue to monitor labs every 6 months.  - defer management to gynecology.    - return to clinic in 6 months with repeat labs.    Len Celis M.D.  Hematology/Oncology  [facility] - [address], [address]  [place] [address]  Phone: [phone]  Fax: [phone]  "

## 2022-06-29 ENCOUNTER — OFFICE VISIT (OUTPATIENT)
Dept: HEMATOLOGY/ONCOLOGY | Facility: CLINIC | Age: 47
End: 2022-06-29
Payer: COMMERCIAL

## 2022-06-29 DIAGNOSIS — D50.0 IRON DEFICIENCY ANEMIA DUE TO CHRONIC BLOOD LOSS: Primary | ICD-10-CM

## 2022-06-29 DIAGNOSIS — N92.0 MENORRHAGIA WITH REGULAR CYCLE: ICD-10-CM

## 2022-06-29 PROCEDURE — 1159F MED LIST DOCD IN RCRD: CPT | Mod: CPTII,95,, | Performed by: INTERNAL MEDICINE

## 2022-06-29 PROCEDURE — 99213 OFFICE O/P EST LOW 20 MIN: CPT | Mod: 95,,, | Performed by: INTERNAL MEDICINE

## 2022-06-29 PROCEDURE — 1160F PR REVIEW ALL MEDS BY PRESCRIBER/CLIN PHARMACIST DOCUMENTED: ICD-10-PCS | Mod: CPTII,95,, | Performed by: INTERNAL MEDICINE

## 2022-06-29 PROCEDURE — 1160F RVW MEDS BY RX/DR IN RCRD: CPT | Mod: CPTII,95,, | Performed by: INTERNAL MEDICINE

## 2022-06-29 PROCEDURE — 1159F PR MEDICATION LIST DOCUMENTED IN MEDICAL RECORD: ICD-10-PCS | Mod: CPTII,95,, | Performed by: INTERNAL MEDICINE

## 2022-06-29 PROCEDURE — 99213 PR OFFICE/OUTPT VISIT, EST, LEVL III, 20-29 MIN: ICD-10-PCS | Mod: 95,,, | Performed by: INTERNAL MEDICINE

## 2022-06-29 NOTE — Clinical Note
1. Schedule labs cbc, ferritin , iron/tibc in 6 months at [facility]. 2. Schedule virtual visit day after labs.  Thanks!

## 2022-08-02 ENCOUNTER — LAB VISIT (OUTPATIENT)
Dept: LAB | Facility: HOSPITAL | Age: 47
End: 2022-08-02
Payer: COMMERCIAL

## 2022-08-02 ENCOUNTER — OFFICE VISIT (OUTPATIENT)
Dept: RHEUMATOLOGY | Facility: CLINIC | Age: 47
End: 2022-08-02
Payer: COMMERCIAL

## 2022-08-02 VITALS
DIASTOLIC BLOOD PRESSURE: 74 MMHG | BODY MASS INDEX: 28.26 KG/M2 | HEART RATE: 71 BPM | WEIGHT: 149.69 LBS | HEIGHT: 61 IN | SYSTOLIC BLOOD PRESSURE: 118 MMHG

## 2022-08-02 DIAGNOSIS — M32.9 SYSTEMIC LUPUS ERYTHEMATOSUS, UNSPECIFIED SLE TYPE, UNSPECIFIED ORGAN INVOLVEMENT STATUS: Primary | ICD-10-CM

## 2022-08-02 DIAGNOSIS — M32.9 SYSTEMIC LUPUS ERYTHEMATOSUS, UNSPECIFIED SLE TYPE, UNSPECIFIED ORGAN INVOLVEMENT STATUS: ICD-10-CM

## 2022-08-02 LAB
ALBUMIN SERPL BCP-MCNC: 4.1 G/DL (ref 3.5–5.2)
ALP SERPL-CCNC: 54 U/L (ref 55–135)
ALT SERPL W/O P-5'-P-CCNC: 58 U/L (ref 10–44)
ANION GAP SERPL CALC-SCNC: 7 MMOL/L (ref 8–16)
AST SERPL-CCNC: 39 U/L (ref 10–40)
BASOPHILS # BLD AUTO: 0.01 K/UL (ref 0–0.2)
BASOPHILS NFR BLD: 0.2 % (ref 0–1.9)
BILIRUB SERPL-MCNC: 0.4 MG/DL (ref 0.1–1)
BUN SERPL-MCNC: 7 MG/DL (ref 6–20)
C3 SERPL-MCNC: 120 MG/DL (ref 50–180)
C4 SERPL-MCNC: 29 MG/DL (ref 11–44)
CALCIUM SERPL-MCNC: 9.4 MG/DL (ref 8.7–10.5)
CHLORIDE SERPL-SCNC: 103 MMOL/L (ref 95–110)
CK SERPL-CCNC: 49 U/L (ref 20–180)
CO2 SERPL-SCNC: 28 MMOL/L (ref 23–29)
CREAT SERPL-MCNC: 0.7 MG/DL (ref 0.5–1.4)
CRP SERPL-MCNC: 1.2 MG/L (ref 0–8.2)
DIFFERENTIAL METHOD: ABNORMAL
EOSINOPHIL # BLD AUTO: 0 K/UL (ref 0–0.5)
EOSINOPHIL NFR BLD: 0.6 % (ref 0–8)
ERYTHROCYTE [DISTWIDTH] IN BLOOD BY AUTOMATED COUNT: 12.4 % (ref 11.5–14.5)
ERYTHROCYTE [SEDIMENTATION RATE] IN BLOOD BY PHOTOMETRIC METHOD: 10 MM/HR (ref 0–36)
EST. GFR  (NO RACE VARIABLE): >60 ML/MIN/1.73 M^2
GLUCOSE SERPL-MCNC: 79 MG/DL (ref 70–110)
HCT VFR BLD AUTO: 41.7 % (ref 37–48.5)
HGB BLD-MCNC: 13.7 G/DL (ref 12–16)
IMM GRANULOCYTES # BLD AUTO: 0.03 K/UL (ref 0–0.04)
IMM GRANULOCYTES NFR BLD AUTO: 0.6 % (ref 0–0.5)
LYMPHOCYTES # BLD AUTO: 1.6 K/UL (ref 1–4.8)
LYMPHOCYTES NFR BLD: 30.8 % (ref 18–48)
MCH RBC QN AUTO: 33.1 PG (ref 27–31)
MCHC RBC AUTO-ENTMCNC: 32.9 G/DL (ref 32–36)
MCV RBC AUTO: 101 FL (ref 82–98)
MONOCYTES # BLD AUTO: 0.8 K/UL (ref 0.3–1)
MONOCYTES NFR BLD: 14.9 % (ref 4–15)
NEUTROPHILS # BLD AUTO: 2.7 K/UL (ref 1.8–7.7)
NEUTROPHILS NFR BLD: 52.9 % (ref 38–73)
NRBC BLD-RTO: 0 /100 WBC
PLATELET # BLD AUTO: 261 K/UL (ref 150–450)
PMV BLD AUTO: 11.6 FL (ref 9.2–12.9)
POTASSIUM SERPL-SCNC: 4.5 MMOL/L (ref 3.5–5.1)
PROT SERPL-MCNC: 7.5 G/DL (ref 6–8.4)
RBC # BLD AUTO: 4.14 M/UL (ref 4–5.4)
SODIUM SERPL-SCNC: 138 MMOL/L (ref 136–145)
WBC # BLD AUTO: 5.09 K/UL (ref 3.9–12.7)

## 2022-08-02 PROCEDURE — 3008F PR BODY MASS INDEX (BMI) DOCUMENTED: ICD-10-PCS | Mod: CPTII,S$GLB,, | Performed by: INTERNAL MEDICINE

## 2022-08-02 PROCEDURE — 85025 COMPLETE CBC W/AUTO DIFF WBC: CPT | Performed by: INTERNAL MEDICINE

## 2022-08-02 PROCEDURE — 3074F SYST BP LT 130 MM HG: CPT | Mod: CPTII,S$GLB,, | Performed by: INTERNAL MEDICINE

## 2022-08-02 PROCEDURE — 86140 C-REACTIVE PROTEIN: CPT | Performed by: INTERNAL MEDICINE

## 2022-08-02 PROCEDURE — 36415 COLL VENOUS BLD VENIPUNCTURE: CPT | Performed by: INTERNAL MEDICINE

## 2022-08-02 PROCEDURE — 82550 ASSAY OF CK (CPK): CPT | Performed by: INTERNAL MEDICINE

## 2022-08-02 PROCEDURE — 3008F BODY MASS INDEX DOCD: CPT | Mod: CPTII,S$GLB,, | Performed by: INTERNAL MEDICINE

## 2022-08-02 PROCEDURE — 3074F PR MOST RECENT SYSTOLIC BLOOD PRESSURE < 130 MM HG: ICD-10-PCS | Mod: CPTII,S$GLB,, | Performed by: INTERNAL MEDICINE

## 2022-08-02 PROCEDURE — 1159F MED LIST DOCD IN RCRD: CPT | Mod: CPTII,S$GLB,, | Performed by: INTERNAL MEDICINE

## 2022-08-02 PROCEDURE — 86225 DNA ANTIBODY NATIVE: CPT | Performed by: INTERNAL MEDICINE

## 2022-08-02 PROCEDURE — 99999 PR PBB SHADOW E&M-EST. PATIENT-LVL III: CPT | Mod: PBBFAC,,, | Performed by: INTERNAL MEDICINE

## 2022-08-02 PROCEDURE — 99999 PR PBB SHADOW E&M-EST. PATIENT-LVL III: ICD-10-PCS | Mod: PBBFAC,,, | Performed by: INTERNAL MEDICINE

## 2022-08-02 PROCEDURE — 86160 COMPLEMENT ANTIGEN: CPT | Mod: 59 | Performed by: INTERNAL MEDICINE

## 2022-08-02 PROCEDURE — 80053 COMPREHEN METABOLIC PANEL: CPT | Performed by: INTERNAL MEDICINE

## 2022-08-02 PROCEDURE — 85652 RBC SED RATE AUTOMATED: CPT | Performed by: INTERNAL MEDICINE

## 2022-08-02 PROCEDURE — 99213 OFFICE O/P EST LOW 20 MIN: CPT | Mod: S$GLB,,, | Performed by: INTERNAL MEDICINE

## 2022-08-02 PROCEDURE — 1159F PR MEDICATION LIST DOCUMENTED IN MEDICAL RECORD: ICD-10-PCS | Mod: CPTII,S$GLB,, | Performed by: INTERNAL MEDICINE

## 2022-08-02 PROCEDURE — 86160 COMPLEMENT ANTIGEN: CPT | Performed by: INTERNAL MEDICINE

## 2022-08-02 PROCEDURE — 3078F DIAST BP <80 MM HG: CPT | Mod: CPTII,S$GLB,, | Performed by: INTERNAL MEDICINE

## 2022-08-02 PROCEDURE — 3078F PR MOST RECENT DIASTOLIC BLOOD PRESSURE < 80 MM HG: ICD-10-PCS | Mod: CPTII,S$GLB,, | Performed by: INTERNAL MEDICINE

## 2022-08-02 PROCEDURE — 99213 PR OFFICE/OUTPT VISIT, EST, LEVL III, 20-29 MIN: ICD-10-PCS | Mod: S$GLB,,, | Performed by: INTERNAL MEDICINE

## 2022-08-02 NOTE — PROGRESS NOTES
Answers for HPI/ROS submitted by the patient on 7/29/2022  fever: No  eye redness: Yes  mouth sores: No  headaches: Yes  shortness of breath: No  chest pain: No  trouble swallowing: No  diarrhea: No  constipation: No  unexpected weight change: No  genital sore: No  dysuria: No  During the last 3 days, have you had a skin rash?: Yes  Bruises or bleeds easily: No  cough: No

## 2022-08-02 NOTE — PROGRESS NOTES
"Subjective:       Patient ID: Mckenna Peraza is a 46 y.o. female.    Chief Complaint: Disease Management    46 year old female presents for follow up. Patient has been following with rheumatology since late 2020 with concerns for lupus. Patient initially presented with diffuse inflammatory arthralgias and was found to have a positive REAGAN at 1:640 (speckled), with positive SSA and + IgM APA.  Patient has been managed on plaquenil since early 2021 and previous visits are notable for minimal symptoms on this medication. Today patient reports that she has had moderate bilateral pain in her hands, but reports that the pain is more in the muscles than in the joints. She reports 1+ hours of mourning stiffness, which improves with activity. She denies allopecia, fevers, facial rash, fatigue, ulcerations, etc. She states that the pain has not stopped her from working. Denies joint swelling.     Review of Systems   Constitutional: Negative for fever and unexpected weight change.   HENT: Negative for mouth sores and trouble swallowing.    Eyes: Negative for redness.   Respiratory: Negative for cough and shortness of breath.    Cardiovascular: Negative for chest pain.   Gastrointestinal: Negative for constipation and diarrhea.   Genitourinary: Negative for dysuria and genital sores.   Neurological: Positive for headaches.   Hematological: Does not bruise/bleed easily.         Objective:   /74   Pulse 71   Ht 5' 1" (1.549 m)   Wt 67.9 kg (149 lb 11.1 oz)   BMI 28.28 kg/m²      Physical Exam   Constitutional:  Non-toxic appearance. She does not appear ill. No distress.   HENT:   Head: Normocephalic and atraumatic.   Nose: Nose normal. No rhinorrhea or nasal congestion.   Mouth/Throat: Mucous membranes are moist. No oropharyngeal exudate or posterior oropharyngeal erythema. Oropharynx is clear.   Eyes: Pupils are equal, round, and reactive to light. Right eye exhibits no discharge. Left eye exhibits no discharge. No " scleral icterus.   Cardiovascular: Normal rate, normal heart sounds and normal pulses. Exam reveals no gallop and no friction rub.   No murmur heard.  Pulmonary/Chest: Effort normal and breath sounds normal. No stridor. No respiratory distress. She has no wheezes. She has no rhonchi. She has no rales. She exhibits no tenderness.   Abdominal: Soft. Normal appearance and bowel sounds are normal. She exhibits no distension and no mass. There is no abdominal tenderness. There is no rebound and no guarding. No hernia.   Musculoskeletal:         General: No swelling, tenderness, deformity or signs of injury. Normal range of motion.      Cervical back: Normal range of motion and neck supple. No rigidity or tenderness.      Right lower leg: No edema.      Left lower leg: No edema.   Neurological: She is alert.   Skin: Skin is warm and dry. No bruising, no lesion and no rash noted. She is not diaphoretic. No erythema. No jaundice or pallor.   Vitals reviewed.        10/19/2020 11/12/2020 11/24/2021   Tender (PEREZ-28) 21 / 28  0 / 28  0 / 28    Swollen (PEREZ-28) 19 / 28  3 / 28  0 / 28    Provider Global -- -- 0 mm   Patient Global -- 30 mm 0 mm   ESR 29 mm/hr 29 mm/hr 3 mm/hr   CRP 7.4 mg/L 7.4 mg/L 1.0 mg/L   PEREZ-28 (ESR) -- 3.26 (Moderate disease activity) 0.77 (Remission)   PEREZ-28 (CRP) -- 2.63 (Low disease activity) 1.21 (Remission)   CDAI Score -- -- 0         Assessment:       1. Systemic lupus erythematosus, unspecified SLE type, unspecified organ involvement status          Patient initially presented with diffuse arthralgias, found to have an REAGAN of 1:640 and +SSA Ab as well as positive antiphospholipid antibody.   2019 EULAR/ACR SLE: 8. This patients presentation likely represents early lupus. She has been off prednisone and Dxa is normal.   Now having pain in her fingers (not joints) and with recent labs mostly unremarkable.   -Continue  mg daily, she needs an eye exam and says she will contact her  ophthalmologist   -Will recheck ESR/CRP/C3-4/dsDNA  -RTC 4 mo      Plan:       Problem List Items Addressed This Visit        Immunology/Multi System    Systemic lupus erythematosus - Primary    Relevant Orders    CK (Completed)    C3 COMPLEMENT    C4 COMPLEMENT    CBC W/ AUTO DIFFERENTIAL (Completed)    COMPREHENSIVE METABOLIC PANEL (Completed)    Sedimentation rate (Completed)    C-REACTIVE PROTEIN (Completed)    ANTI-DNA ANTIBODY, DOUBLE-STRANDED    Urinalysis    Protein / creatinine ratio, urine        Melecio Ribera PGY4

## 2022-08-03 ENCOUNTER — OFFICE VISIT (OUTPATIENT)
Dept: ORTHOPEDICS | Facility: CLINIC | Age: 47
End: 2022-08-03
Payer: COMMERCIAL

## 2022-08-03 VITALS — HEIGHT: 61 IN | BODY MASS INDEX: 28.26 KG/M2 | WEIGHT: 149.69 LBS

## 2022-08-03 DIAGNOSIS — M32.9 SYSTEMIC LUPUS ERYTHEMATOSUS, UNSPECIFIED SLE TYPE, UNSPECIFIED ORGAN INVOLVEMENT STATUS: ICD-10-CM

## 2022-08-03 DIAGNOSIS — M25.511 CHRONIC RIGHT SHOULDER PAIN: Primary | ICD-10-CM

## 2022-08-03 DIAGNOSIS — G89.29 CHRONIC RIGHT SHOULDER PAIN: Primary | ICD-10-CM

## 2022-08-03 LAB — DSDNA AB SER-ACNC: NORMAL [IU]/ML

## 2022-08-03 PROCEDURE — 1159F MED LIST DOCD IN RCRD: CPT | Mod: CPTII,S$GLB,, | Performed by: ORTHOPAEDIC SURGERY

## 2022-08-03 PROCEDURE — 99999 PR PBB SHADOW E&M-EST. PATIENT-LVL III: CPT | Mod: PBBFAC,,, | Performed by: ORTHOPAEDIC SURGERY

## 2022-08-03 PROCEDURE — 1159F PR MEDICATION LIST DOCUMENTED IN MEDICAL RECORD: ICD-10-PCS | Mod: CPTII,S$GLB,, | Performed by: ORTHOPAEDIC SURGERY

## 2022-08-03 PROCEDURE — 99213 PR OFFICE/OUTPT VISIT, EST, LEVL III, 20-29 MIN: ICD-10-PCS | Mod: S$GLB,,, | Performed by: ORTHOPAEDIC SURGERY

## 2022-08-03 PROCEDURE — 99213 OFFICE O/P EST LOW 20 MIN: CPT | Mod: S$GLB,,, | Performed by: ORTHOPAEDIC SURGERY

## 2022-08-03 PROCEDURE — 3008F PR BODY MASS INDEX (BMI) DOCUMENTED: ICD-10-PCS | Mod: CPTII,S$GLB,, | Performed by: ORTHOPAEDIC SURGERY

## 2022-08-03 PROCEDURE — 3008F BODY MASS INDEX DOCD: CPT | Mod: CPTII,S$GLB,, | Performed by: ORTHOPAEDIC SURGERY

## 2022-08-03 PROCEDURE — 99999 PR PBB SHADOW E&M-EST. PATIENT-LVL III: ICD-10-PCS | Mod: PBBFAC,,, | Performed by: ORTHOPAEDIC SURGERY

## 2022-08-03 RX ORDER — HYDROXYCHLOROQUINE SULFATE 200 MG/1
200 TABLET, FILM COATED ORAL DAILY
COMMUNITY
End: 2022-12-05 | Stop reason: SDUPTHER

## 2022-08-03 NOTE — PROGRESS NOTES
"Subjective:      Patient ID: Mckenna Peraza is a 46 y.o. female.  Chief Complaint: Follow-up (1 mth - rt shoulder )      HPI  Mckenna Peraza is a  46 y.o. female presenting today for follow up of right shoulder impingement tendinosis.  She reports that she is much improved since the injection last visit no further symptoms.    Review of patient's allergies indicates:  No Known Allergies      Current Outpatient Medications   Medication Sig Dispense Refill    hydrOXYchloroQUINE (PLAQUENIL) 200 mg tablet Take 200 mg by mouth once daily.      clobetasoL (TEMOVATE) 0.05 % external solution Apply topically once daily.      ferrous sulfate (FEOSOL) 325 mg (65 mg iron) Tab tablet Take 1 tablet by mouth 2 (two) times daily.      hydrOXYzine HCL (ATARAX) 10 MG Tab Take 10 mg by mouth every 6 (six) hours as needed.      triamcinolone acetonide 0.1% (KENALOG) 0.1 % ointment Apply 1 application topically 2 (two) times daily.       Current Facility-Administered Medications   Medication Dose Route Frequency Provider Last Rate Last Admin    levonorgestreL 20 mcg/24 hours (5 yrs) 52 mg IUD 1 Intra Uterine Device  1 each Intrauterine 1 time in Clinic/HOD Tila Estevez MD           Past Medical History:   Diagnosis Date    Abnormal Pap smear of cervix        Past Surgical History:   Procedure Laterality Date    CERVICAL BIOPSY  W/ LOOP ELECTRODE EXCISION       SECTION      five    HERNIA REPAIR      LEFT OOPHORECTOMY         OBJECTIVE:   PHYSICAL EXAM:  Height: 5' 1" (154.9 cm) Weight: 67.9 kg (149 lb 11.1 oz)  Vitals:    22 1401   Weight: 67.9 kg (149 lb 11.1 oz)   Height: 5' 1" (1.549 m)   PainSc:   3   PainLoc: Generalized     Ortho/SPM Exam  Examination right shoulder no tenderness no swelling no bruising  Range of motion full  Negative impingement sign good strength  Neurologic exam intact    RADIOGRAPHS:  None  Comments: I have personally reviewed the imaging and I agree with the above " radiologist's report.    ASSESSMENT/PLAN:     IMPRESSION:  Right shoulder impingement bursitis improved    PLAN:  I recommend she return to regular activities but avoid overhead lifting  Keep an eye on symptoms    FOLLOW UP:  As needed if symptoms recur    Disclaimer: This note has been generated using voice-recognition software. There may be typographical errors that have been missed during proof-reading.

## 2022-08-03 NOTE — PROGRESS NOTES
I have personally taken the history and examined the patient to verify the fellow's notation, and I agree with the impression and plan stated.      She has some pain of uncertain etiology and clinical significance but we will obtain some additional lab to see if she is developing progressive inflammatory myositis or other complication of previously early/incomplete lupus. Meantime advised symptomatic Rx with NSAID.

## 2022-08-04 RX ORDER — HYDROXYCHLOROQUINE SULFATE 200 MG/1
300 TABLET, FILM COATED ORAL DAILY
Qty: 135 TABLET | Refills: 0 | Status: SHIPPED | OUTPATIENT
Start: 2022-08-04 | End: 2022-10-28 | Stop reason: SDUPTHER

## 2022-10-28 DIAGNOSIS — M32.9 SYSTEMIC LUPUS ERYTHEMATOSUS, UNSPECIFIED SLE TYPE, UNSPECIFIED ORGAN INVOLVEMENT STATUS: ICD-10-CM

## 2022-10-31 RX ORDER — HYDROXYCHLOROQUINE SULFATE 200 MG/1
300 TABLET, FILM COATED ORAL DAILY
Qty: 135 TABLET | Refills: 0 | Status: SHIPPED | OUTPATIENT
Start: 2022-10-31 | End: 2022-12-05

## 2022-12-05 ENCOUNTER — OFFICE VISIT (OUTPATIENT)
Dept: RHEUMATOLOGY | Facility: CLINIC | Age: 47
End: 2022-12-05
Payer: COMMERCIAL

## 2022-12-05 VITALS — HEIGHT: 61 IN | BODY MASS INDEX: 28.89 KG/M2 | WEIGHT: 153 LBS

## 2022-12-05 DIAGNOSIS — M32.19 OTHER SYSTEMIC LUPUS ERYTHEMATOSUS WITH OTHER ORGAN INVOLVEMENT: ICD-10-CM

## 2022-12-05 DIAGNOSIS — M32.9 SYSTEMIC LUPUS ERYTHEMATOSUS, UNSPECIFIED SLE TYPE, UNSPECIFIED ORGAN INVOLVEMENT STATUS: ICD-10-CM

## 2022-12-05 PROBLEM — M79.10 MYALGIA: Status: RESOLVED | Noted: 2020-10-20 | Resolved: 2022-12-05

## 2022-12-05 PROBLEM — M25.50 MULTIPLE JOINT PAIN: Status: RESOLVED | Noted: 2020-10-15 | Resolved: 2022-12-05

## 2022-12-05 PROCEDURE — 99214 PR OFFICE/OUTPT VISIT, EST, LEVL IV, 30-39 MIN: ICD-10-PCS | Mod: S$GLB,,, | Performed by: INTERNAL MEDICINE

## 2022-12-05 PROCEDURE — 99999 PR PBB SHADOW E&M-EST. PATIENT-LVL III: CPT | Mod: PBBFAC,,, | Performed by: INTERNAL MEDICINE

## 2022-12-05 PROCEDURE — 1160F PR REVIEW ALL MEDS BY PRESCRIBER/CLIN PHARMACIST DOCUMENTED: ICD-10-PCS | Mod: CPTII,S$GLB,, | Performed by: INTERNAL MEDICINE

## 2022-12-05 PROCEDURE — 1160F RVW MEDS BY RX/DR IN RCRD: CPT | Mod: CPTII,S$GLB,, | Performed by: INTERNAL MEDICINE

## 2022-12-05 PROCEDURE — 3008F PR BODY MASS INDEX (BMI) DOCUMENTED: ICD-10-PCS | Mod: CPTII,S$GLB,, | Performed by: INTERNAL MEDICINE

## 2022-12-05 PROCEDURE — 3008F BODY MASS INDEX DOCD: CPT | Mod: CPTII,S$GLB,, | Performed by: INTERNAL MEDICINE

## 2022-12-05 PROCEDURE — 1159F PR MEDICATION LIST DOCUMENTED IN MEDICAL RECORD: ICD-10-PCS | Mod: CPTII,S$GLB,, | Performed by: INTERNAL MEDICINE

## 2022-12-05 PROCEDURE — 99214 OFFICE O/P EST MOD 30 MIN: CPT | Mod: S$GLB,,, | Performed by: INTERNAL MEDICINE

## 2022-12-05 PROCEDURE — 99999 PR PBB SHADOW E&M-EST. PATIENT-LVL III: ICD-10-PCS | Mod: PBBFAC,,, | Performed by: INTERNAL MEDICINE

## 2022-12-05 PROCEDURE — 1159F MED LIST DOCD IN RCRD: CPT | Mod: CPTII,S$GLB,, | Performed by: INTERNAL MEDICINE

## 2022-12-05 RX ORDER — HYDROXYCHLOROQUINE SULFATE 200 MG/1
200 TABLET, FILM COATED ORAL DAILY
Qty: 90 TABLET | Refills: 1 | Status: SHIPPED | OUTPATIENT
Start: 2022-12-05 | End: 2023-05-15

## 2022-12-05 ASSESSMENT — ROUTINE ASSESSMENT OF PATIENT INDEX DATA (RAPID3)
PATIENT GLOBAL ASSESSMENT SCORE: 0
MDHAQ FUNCTION SCORE: 0
FATIGUE SCORE: 0
PSYCHOLOGICAL DISTRESS SCORE: 3.3
AM STIFFNESS SCORE: 0, NO
TOTAL RAPID3 SCORE: 0.3
PAIN SCORE: 0.9

## 2022-12-05 ASSESSMENT — SYSTEMIC LUPUS ERYTHEMATOSUS DISEASE ACTIVITY INDEX (SLEDAI): TOTAL_SCORE: 0

## 2022-12-05 NOTE — PROGRESS NOTES
Rapid3 Question Responses and Scores 12/3/2022   MDHAQ Score 0   Psychologic Score 3.3   Pain Score 0.5   When you awakened in the morning OVER THE LAST WEEK, did you feel stiff? No   If Yes, please indicate the number of hours until you are as limber as you will be for the day -   Fatigue Score 0   Global Health Score 0   RAPID3 Score 0.17

## 2022-12-05 NOTE — ASSESSMENT & PLAN NOTE
She is symptoms free on 300 mg/d hydroxychloroquine and has normal pe and labs    Will reduce hydroxychloroquine to 200 mg/d  Will repeat labs including SSA and APA in 6 mo and reassess then

## 2022-12-29 NOTE — PROGRESS NOTES
PATIENT: Mckenna Peraza  MRN: [id number]  DATE: 12/30/2022    Diagnosis:   1. Iron deficiency anemia due to chronic blood loss    2. Menorrhagia with regular cycle    3. Other systemic lupus erythematosus with other organ involvement      Chief Complaint: Anemia    Subjective:    History of Present Illness: Ms. Peraza is a 47 y.o. female who presented in October 2020 for evaluation and management of iron deficiency anemia. She was referred by Dr. Zambrano.    Telemedicine visit:  The patient location is: home  The chief complaint leading to consultation is: iron deficiency anemia.    Visit type: audiovisual    Face to Face time with patient: 5 minutes  10 minutes of total time spent on the encounter, which includes face to face time and non-face to face time preparing to see the patient (eg, review of tests), Obtaining and/or reviewing separately obtained history, Documenting clinical information in the electronic or other health record, Independently interpreting results (not separately reported) and communicating results to the patient/family/caregiver, or Care coordination (not separately reported).     Each patient to whom he or she provides medical services by telemedicine is:  (1) informed of the relationship between the physician and patient and the respective role of any other health care provider with respect to management of the patient; and (2) notified that he or she may decline to receive medical services by telemedicine and may withdraw from such care at any time.    Notes: see below      - labs in October 2020 reveal a microcytic anemia with intermittent thrombocytosis.  - iron studies (10/19/20) reveal a decreased ferritin/iron/saturated iron.  - she received four doses of iron sucrose in November 2020    Interval history:  - she presents for a follow-up appointment for her iron deficiency anemia.  - today, she is doing well. She just finished a heavy period a few days ago. She denies fatigue,  shortness of breath, chest pain, nausea, vomiting, diarrhea, constipation.  - she does not want more IV iron at this time.        Past medical, surgical, family, and social histories have been reviewed and updated below.    Past Medical History:   Past Medical History:   Diagnosis Date    Abnormal Pap smear of cervix        Past Surgical History:   Past Surgical History:   Procedure Laterality Date    CERVICAL BIOPSY  W/ LOOP ELECTRODE EXCISION       SECTION      [age]    HERNIA REPAIR      LEFT OOPHORECTOMY         Family History:   Family History   Problem Relation Age of Onset    Diabetes Mother     Heart disease Mother     Diabetes Brother     Diabetes Brother     Breast cancer Neg Hx     Ovarian cancer Neg Hx     Colon cancer Neg Hx     Endometrial cancer Neg Hx        Social History:  reports that she has never smoked. She has never used smokeless tobacco. She reports that she does not drink alcohol and does not use drugs.    Allergies:  Review of patient's allergies indicates:  No Known Allergies    Medications:  Current Outpatient Medications   Medication Sig Dispense Refill    clobetasoL (TEMOVATE) 0.05 % external solution Apply topically once daily.      hydrOXYchloroQUINE (PLAQUENIL) 200 mg tablet Take 1 tablet (200 mg total) by mouth once daily. 90 tablet 1    hydrOXYzine HCL (ATARAX) 10 MG Tab Take 10 mg by mouth every 6 (six) hours as needed.      triamcinolone acetonide 0.1% (KENALOG) 0.1 % ointment Apply 1 application topically 2 (two) times daily.       Current Facility-Administered Medications   Medication Dose Route Frequency Provider Last Rate Last Admin    levonorgestreL 20 mcg/24 hours (5 yrs) 52 mg IUD 1 Intra Uterine Device  1 each Intrauterine 1 time in Clinic/HOD Tila Estevez MD           Review of Systems   Constitutional:  Negative for fatigue.   HENT:  Negative for sore throat.    Eyes:  Negative for visual disturbance.   Respiratory:  Negative for cough and shortness of  breath.    Cardiovascular:  Negative for chest pain.   Gastrointestinal:  Negative for abdominal pain, constipation, diarrhea, nausea and vomiting.   Genitourinary:  Negative for dysuria and menstrual problem.   Musculoskeletal:  Negative for back pain.   Skin:  Negative for rash.   Neurological:  Negative for headaches.   Hematological:  Negative for adenopathy.   Psychiatric/Behavioral:  The patient is not nervous/anxious.      ECOG Performance Status:   ECOG SCORE 0       Objective:      Vitals: There were no vitals filed for this visit.  BMI: There is no height or weight on file to calculate BMI.  Deferred since telemedicine visit    Physical Exam  Deferred since telemedicine visit    Laboratory Data:  Labs have been reviewed.    Lab Results   Component Value Date    WBC 5.85 12/29/2022    HGB 12.9 12/29/2022    HCT 39.1 12/29/2022    MCV 96 12/29/2022     12/29/2022         Imaging:    Assessment:       1. Iron deficiency anemia due to chronic blood loss    2. Menorrhagia with regular cycle    3. Other systemic lupus erythematosus with other organ involvement           Plan:     1. Iron deficiency anemia due to chronic blood loss / reactive thrombocytosis  - I have reviewed her chart.  - labs in October 2020 reveal a microcytic anemia with intermittent thrombocytosis.  - iron studies (10/19/20) reveal a decreased ferritin/iron/saturated iron.  - given the severity of her iron deficiency anemia, I recommended iron sucrose weekly x 4 doses.  - she received four doses of iron sucrose in November 2020  - her periods have resumed since having her IUD removed.  - Labs have been reviewed. Hemoglobin is 12.9 g/dL. Ferritin has decreased from 67 ng/mL to 14 ng/mL.  - I offered to set her up for more IV iron, but she does not want more IV iron at this time.  - I sent a prescription for ferrous sulfate daily to her pharmacy  - return to clinic in 6 months with repeat labs.     2. Menorrhagia with regular cycles.  -  the cause of her iron deficiency anemia.  - her periods have resumed since having her IUD removed.  - since her periods have resumed, we will continue to monitor labs every 6 months.  - defer management to gynecology.    - return to clinic in 6 months with repeat labs.    Len Celis M.D.  Hematology/Oncology  [facility] - [address], [address]  [place] [address]  Phone: [phone]  Fax: [phone]

## 2022-12-30 ENCOUNTER — OFFICE VISIT (OUTPATIENT)
Dept: HEMATOLOGY/ONCOLOGY | Facility: CLINIC | Age: 47
End: 2022-12-30
Payer: COMMERCIAL

## 2022-12-30 DIAGNOSIS — N92.0 MENORRHAGIA WITH REGULAR CYCLE: ICD-10-CM

## 2022-12-30 DIAGNOSIS — M32.19 OTHER SYSTEMIC LUPUS ERYTHEMATOSUS WITH OTHER ORGAN INVOLVEMENT: ICD-10-CM

## 2022-12-30 DIAGNOSIS — D50.0 IRON DEFICIENCY ANEMIA DUE TO CHRONIC BLOOD LOSS: Primary | ICD-10-CM

## 2022-12-30 PROCEDURE — 99214 PR OFFICE/OUTPT VISIT, EST, LEVL IV, 30-39 MIN: ICD-10-PCS | Mod: 95,,, | Performed by: INTERNAL MEDICINE

## 2022-12-30 PROCEDURE — 1159F PR MEDICATION LIST DOCUMENTED IN MEDICAL RECORD: ICD-10-PCS | Mod: CPTII,95,, | Performed by: INTERNAL MEDICINE

## 2022-12-30 PROCEDURE — 99214 OFFICE O/P EST MOD 30 MIN: CPT | Mod: 95,,, | Performed by: INTERNAL MEDICINE

## 2022-12-30 PROCEDURE — 1160F PR REVIEW ALL MEDS BY PRESCRIBER/CLIN PHARMACIST DOCUMENTED: ICD-10-PCS | Mod: CPTII,95,, | Performed by: INTERNAL MEDICINE

## 2022-12-30 PROCEDURE — 1160F RVW MEDS BY RX/DR IN RCRD: CPT | Mod: CPTII,95,, | Performed by: INTERNAL MEDICINE

## 2022-12-30 PROCEDURE — 1159F MED LIST DOCD IN RCRD: CPT | Mod: CPTII,95,, | Performed by: INTERNAL MEDICINE

## 2022-12-30 RX ORDER — FERROUS SULFATE 325(65) MG
325 TABLET ORAL DAILY
Qty: 90 TABLET | Refills: 3 | Status: SHIPPED | OUTPATIENT
Start: 2022-12-30 | End: 2023-11-07

## 2022-12-30 NOTE — Clinical Note
1. Schedule labs cbc, ferritin, iron/tibc in [place] in 6 months 2. Schedule f/u virtual visit 1 day after labs.  Thanks!

## 2023-03-23 ENCOUNTER — PATIENT MESSAGE (OUTPATIENT)
Dept: ADMINISTRATIVE | Facility: HOSPITAL | Age: 48
End: 2023-03-23
Payer: COMMERCIAL

## 2023-03-23 DIAGNOSIS — Z12.11 SCREENING FOR COLON CANCER: ICD-10-CM

## 2023-04-04 ENCOUNTER — OFFICE VISIT (OUTPATIENT)
Dept: DERMATOLOGY | Facility: CLINIC | Age: 48
End: 2023-04-04
Payer: COMMERCIAL

## 2023-04-04 ENCOUNTER — TELEPHONE (OUTPATIENT)
Dept: DERMATOLOGY | Facility: CLINIC | Age: 48
End: 2023-04-04
Payer: COMMERCIAL

## 2023-04-04 DIAGNOSIS — L30.9 ECZEMA, UNSPECIFIED TYPE: Primary | ICD-10-CM

## 2023-04-04 DIAGNOSIS — M32.19 OTHER SYSTEMIC LUPUS ERYTHEMATOSUS WITH OTHER ORGAN INVOLVEMENT: ICD-10-CM

## 2023-04-04 PROCEDURE — 99204 OFFICE O/P NEW MOD 45 MIN: CPT | Mod: 95,,, | Performed by: DERMATOLOGY

## 2023-04-04 PROCEDURE — 99204 PR OFFICE/OUTPT VISIT, NEW, LEVL IV, 45-59 MIN: ICD-10-PCS | Mod: 95,,, | Performed by: DERMATOLOGY

## 2023-04-04 PROCEDURE — 1160F RVW MEDS BY RX/DR IN RCRD: CPT | Mod: CPTII,95,, | Performed by: DERMATOLOGY

## 2023-04-04 PROCEDURE — 1159F PR MEDICATION LIST DOCUMENTED IN MEDICAL RECORD: ICD-10-PCS | Mod: CPTII,95,, | Performed by: DERMATOLOGY

## 2023-04-04 PROCEDURE — 1160F PR REVIEW ALL MEDS BY PRESCRIBER/CLIN PHARMACIST DOCUMENTED: ICD-10-PCS | Mod: CPTII,95,, | Performed by: DERMATOLOGY

## 2023-04-04 PROCEDURE — 1159F MED LIST DOCD IN RCRD: CPT | Mod: CPTII,95,, | Performed by: DERMATOLOGY

## 2023-04-04 RX ORDER — PIMECROLIMUS 10 MG/G
CREAM TOPICAL 2 TIMES DAILY
Qty: 30 G | Refills: 2 | Status: SHIPPED | OUTPATIENT
Start: 2023-04-04 | End: 2023-11-07

## 2023-04-04 RX ORDER — DESONIDE 0.5 MG/G
CREAM TOPICAL
Qty: 15 G | Refills: 0 | Status: SHIPPED | OUTPATIENT
Start: 2023-04-04 | End: 2023-11-07

## 2023-04-04 RX ORDER — CLOBETASOL PROPIONATE 0.46 MG/ML
SOLUTION TOPICAL
Qty: 50 ML | Refills: 3 | Status: SHIPPED | OUTPATIENT
Start: 2023-04-04 | End: 2023-11-07

## 2023-04-04 RX ORDER — HYDROXYZINE HYDROCHLORIDE 10 MG/1
10 TABLET, FILM COATED ORAL 3 TIMES DAILY PRN
Qty: 30 TABLET | Refills: 0 | Status: SHIPPED | OUTPATIENT
Start: 2023-04-04 | End: 2023-06-21

## 2023-04-04 RX ORDER — BETAMETHASONE DIPROPIONATE 0.5 MG/G
CREAM TOPICAL 2 TIMES DAILY
Qty: 45 G | Refills: 1 | Status: SHIPPED | OUTPATIENT
Start: 2023-04-04 | End: 2023-11-07

## 2023-04-04 NOTE — PATIENT INSTRUCTIONS
Sunscreens  For connective tissue diseases, it is best to use sunscreens that have physical blockers/mineral sunscreen ingredients in them (zinc or titanium). Some good drugstore brands are Neutrogena and La Roche Posay. Pricier ones that I recommend are Colorescience, Supergoop, ISDIN.  Men may prefer the sunscreen sticks. Some SPF 50 options below:    Neutrogena Sheer Zinc Dry Touch Sunscreen SPF 50    La Roche Posay Anthelios Mineral Zinc Oxide Sunscreen SPF 50    Colorescience Sunforgettable Sport Stick SPF 50 (goes on white but becomes transparent)    Supergoop 100% Mineral Sunscreen Stick SPF 50    ISDIN Eryfotona Actinica SPF 50

## 2023-04-04 NOTE — PROGRESS NOTES
Subjective:      Patient ID:  Mckenna Peraza is a 47 y.o. female who presents for No chief complaint on file.    The patient location is: [place]  The chief complaint leading to consultation is: eczema flare    Visit type: audiovisual    Face to Face time with patient: 15 minutes  20 minutes of total time spent on the encounter, which includes face to face time and non-face to face time preparing to see the patient (eg, review of tests), Obtaining and/or reviewing separately obtained history, Documenting clinical information in the electronic or other health record, Independently interpreting results (not separately reported) and communicating results to the patient/family/caregiver, or Care coordination (not separately reported).         Each patient to whom he or she provides medical services by telemedicine is:  (1) informed of the relationship between the physician and patient and the respective role of any other health care provider with respect to management of the patient; and (2) notified that he or she may decline to receive medical services by telemedicine and may withdraw from such care at any time.    Notes:       History of Present Illness: The patient presents with chief complaint of eczema flare.  Location: scalp, ears, cheek, upper back, R arm, thighs  Duration: current flare x 6 days; has had eczema x 8 years; reports she flares monthly  Signs/Symptoms: red itchy painful rash     Prior treatments:   TAC 0.1% ointment - not helping  Topical steroid solution - is out  Atarax - is out  Dupixent x 6 months - during 2021; reports her skin symptoms resolved while taking it, but it was stopped because she got sick and then she was diagnosed with lupus  Hydrocortisone    Reports she previously would get oral prednisone for flares from previous dermatologist; denies HTN, DM, glaucoma (reports it has been over a year since she has taken this)    She thinks the flares seem to correlate with her menstrual  periods    h/o SLE on plaquenil 200 mg daily (dose reduced 12/2022), sees rheum (Dr. Cameron)      Review of Systems   Skin:  Positive for itching and rash.     Objective:   Physical Exam   Constitutional: She appears well-developed and well-nourished. No distress.   Neurological: She is alert and oriented to person, place, and time. She is not disoriented.   Psychiatric: She has a normal mood and affect.   Skin:   Areas Examined (abnormalities noted in diagram):   Scalp / Hair Palpated and Inspected  Head / Face Inspection Performed  Back Inspection Performed  LUE Inspection Performed  RLE Inspected  LLE Inspection Performed               Diagram Legend     Erythematous scaling macule/papule c/w actinic keratosis       Vascular papule c/w angioma      Pigmented verrucoid papule/plaque c/w seborrheic keratosis      Yellow umbilicated papule c/w sebaceous hyperplasia      Irregularly shaped tan macule c/w lentigo     1-2 mm smooth white papules consistent with Milia      Movable subcutaneous cyst with punctum c/w epidermal inclusion cyst      Subcutaneous movable cyst c/w pilar cyst      Firm pink to brown papule c/w dermatofibroma      Pedunculated fleshy papule(s) c/w skin tag(s)      Evenly pigmented macule c/w junctional nevus     Mildly variegated pigmented, slightly irregular-bordered macule c/w mildly atypical nevus      Flesh colored to evenly pigmented papule c/w intradermal nevus       Pink pearly papule/plaque c/w basal cell carcinoma      Erythematous hyperkeratotic cursted plaque c/w SCC      Surgical scar with no sign of skin cancer recurrence      Open and closed comedones      Inflammatory papules and pustules      Verrucoid papule consistent consistent with wart     Erythematous eczematous patches and plaques     Dystrophic onycholytic nail with subungual debris c/w onychomycosis     Umbilicated papule    Erythematous-base heme-crusted tan verrucoid plaque consistent with inflamed seborrheic  keratosis     Erythematous Silvery Scaling Plaque c/w Psoriasis     See annotation                Assessment / Plan:        Eczema, unspecified type  H/o Other systemic lupus erythematosus with other organ involvement  - eczematous vs cutaneous lupus; exam limited 2/2 photo/video quality. Recommend in person evaluation. Will message [facility] derm to see if she can get on priority list or be seen in acute derm clinic for this flare  -     betamethasone dipropionate 0.05 % cream; Apply topically 2 (two) times daily. To affected areas on body for 2-4 weeks per course only as needed for rash  Dispense: 45 g; Refill: 1  -     pimecrolimus (ELIDEL) 1 % cream; Apply topically 2 (two) times daily. To affected areas on face as needed for rash  Dispense: 30 g; Refill: 2  -     desonide (DESOWEN) 0.05 % cream; To affected areas on face BID as needed for rash for only up to 1 week per course  Dispense: 15 g; Refill: 0  -     clobetasoL (TEMOVATE) 0.05 % external solution; Use on scalp one - two times daily as needed for scaling or itching for up to 2-4 weeks per course  Dispense: 50 mL; Refill: 3  -     hydrOXYzine HCL (ATARAX) 10 MG Tab; Take 1 tablet (10 mg total) by mouth 3 (three) times daily as needed (itching). Caution, will make you drowsy.  Dispense: 30 tablet; Refill: 0  - aggressive sun protection recommended, gave written SPF recs  - she requests considering oral prednisone; we discussed that if in 1-2 weeks she has no improvement with topicals, she can message for a pred taper    Side effects of prednisone including osteoporosis/osteopenia, hyperglycemia, weight gain, bloating and hypertension reviewed with the patient. The patient acknowledged understanding.        Side effect profile reviewed for topical steroids including atrophy, telangiectasia and striae development with prolonged usage.  Patient acknowledged understanding.      Side effect profile reviewed for topical steroids including atrophy,  telangiectasia and striae development with prolonged usage.  Patient acknowledged understanding.               RTC in person within 4 weeks

## 2023-04-04 NOTE — TELEPHONE ENCOUNTER
Spoke to pt. Sched pt with acute derm per refer. Pt agreed to be seen 5/1. Pt thanked me  ----- Message from Samantha Mason MD sent at 4/4/2023  9:54 AM CDT -----  Regarding: acute derm clinic within 2-4 weeks for AD vs cutaneous lupus flare  acute derm clinic within 2-4 weeks for AD vs cutaneous lupus flare

## 2023-04-19 LAB — HEMOCCULT STL QL IA: NEGATIVE

## 2023-05-01 ENCOUNTER — OFFICE VISIT (OUTPATIENT)
Dept: DERMATOLOGY | Facility: CLINIC | Age: 48
End: 2023-05-01
Payer: COMMERCIAL

## 2023-05-01 DIAGNOSIS — L30.9 DERMATITIS, UNSPECIFIED: ICD-10-CM

## 2023-05-01 DIAGNOSIS — D23.9 DERMATOFIBROMA: ICD-10-CM

## 2023-05-01 DIAGNOSIS — M32.19 OTHER SYSTEMIC LUPUS ERYTHEMATOSUS WITH OTHER ORGAN INVOLVEMENT: Primary | ICD-10-CM

## 2023-05-01 PROCEDURE — 88312 SPECIAL STAINS GROUP 1: CPT | Mod: 26,,, | Performed by: DERMATOLOGY

## 2023-05-01 PROCEDURE — 88313 SPECIAL STAINS GROUP 2: CPT | Mod: 26,,, | Performed by: DERMATOLOGY

## 2023-05-01 PROCEDURE — 99999 PR PBB SHADOW E&M-EST. PATIENT-LVL II: ICD-10-PCS | Mod: PBBFAC,,,

## 2023-05-01 PROCEDURE — 88312 SPECIAL STAINS GROUP 1: CPT | Performed by: DERMATOLOGY

## 2023-05-01 PROCEDURE — 99213 OFFICE O/P EST LOW 20 MIN: CPT | Mod: 25,S$GLB,, | Performed by: DERMATOLOGY

## 2023-05-01 PROCEDURE — 88305 TISSUE EXAM BY PATHOLOGIST: CPT | Performed by: DERMATOLOGY

## 2023-05-01 PROCEDURE — 99999 PR PBB SHADOW E&M-EST. PATIENT-LVL II: CPT | Mod: PBBFAC,,,

## 2023-05-01 PROCEDURE — 11102 TANGNTL BX SKIN SINGLE LES: CPT | Mod: S$GLB,,, | Performed by: DERMATOLOGY

## 2023-05-01 PROCEDURE — 88313 PR  SPECIAL STAINS,GROUP II: ICD-10-PCS | Mod: 26,,, | Performed by: DERMATOLOGY

## 2023-05-01 PROCEDURE — 1160F RVW MEDS BY RX/DR IN RCRD: CPT | Mod: CPTII,S$GLB,, | Performed by: DERMATOLOGY

## 2023-05-01 PROCEDURE — 1160F PR REVIEW ALL MEDS BY PRESCRIBER/CLIN PHARMACIST DOCUMENTED: ICD-10-PCS | Mod: CPTII,S$GLB,, | Performed by: DERMATOLOGY

## 2023-05-01 PROCEDURE — 1159F PR MEDICATION LIST DOCUMENTED IN MEDICAL RECORD: ICD-10-PCS | Mod: CPTII,S$GLB,, | Performed by: DERMATOLOGY

## 2023-05-01 PROCEDURE — 88305 TISSUE EXAM BY PATHOLOGIST: CPT | Mod: 26,,, | Performed by: DERMATOLOGY

## 2023-05-01 PROCEDURE — 1159F MED LIST DOCD IN RCRD: CPT | Mod: CPTII,S$GLB,, | Performed by: DERMATOLOGY

## 2023-05-01 PROCEDURE — 99213 PR OFFICE/OUTPT VISIT, EST, LEVL III, 20-29 MIN: ICD-10-PCS | Mod: 25,S$GLB,, | Performed by: DERMATOLOGY

## 2023-05-01 PROCEDURE — 88305 TISSUE EXAM BY PATHOLOGIST: ICD-10-PCS | Mod: 26,,, | Performed by: DERMATOLOGY

## 2023-05-01 PROCEDURE — 88313 SPECIAL STAINS GROUP 2: CPT | Performed by: DERMATOLOGY

## 2023-05-01 PROCEDURE — 88312 PR  SPECIAL STAINS,GROUP I: ICD-10-PCS | Mod: 26,,, | Performed by: DERMATOLOGY

## 2023-05-01 PROCEDURE — 11102 PR TANGENTIAL BIOPSY, SKIN, SINGLE LESION: ICD-10-PCS | Mod: S$GLB,,, | Performed by: DERMATOLOGY

## 2023-05-01 NOTE — PROGRESS NOTES
"  Subjective:      Patient ID:  Mckenna Peraza is a 47 y.o. female who presents for No chief complaint on file.    Ms. Andres is a 48 yo female with history of lupus presenting today for acute rash x 2 months. The rash is located on her upper back, elbows, face, and arms. It was initially itchy but now is more of a stinging and burning. She had a televisit appointment in [place] with Dr. Mason who resumed her on topicals, including desonide for her face, clobetasol solution for scalp and cream for body. She reports the rash is since clearing up and drying out. She says this looks similar to her "eczema" rash. In terms of her lupus, she reports a long standing 20+ year history of eczema previously treated with topical steroids and oral prednisone with intermittent flares. At one point patient was placed on dupixent and remained clear for 6 months. She then reported waking up one mornging in severe pain and rash all over, and was subsequently diagnosed with lupus. She follows with rheumatology and is on plaquenil 200 mg daily. She tolerates medication, gets regular eye exams. Also concerned about bump on left leg that has been there x years, nontender and non itchy.      Review of Systems   Constitutional: Negative.    HENT: Negative.     Eyes: Negative.    Respiratory: Negative.     Cardiovascular: Negative.    Breast: negative.    Gastrointestinal: Negative.    Genitourinary: Negative.    Musculoskeletal: Negative.    Skin:  Positive for itching and rash.   Neurological: Negative.    Psychiatric/Behavioral: Negative.     All other systems reviewed and are negative.  Endocrine: Negative.    Hematologic/Lymphatic: Negative.    Allergic/Immunologic: Negative.      Objective:   Physical Exam   Constitutional: She appears well-developed and well-nourished. No distress.   Neurological: She is alert and oriented to person, place, and time. She is not disoriented.   Psychiatric: She has a normal mood and affect. "   Skin:             Diagram Legend     Erythematous scaling macule/papule c/w actinic keratosis       Vascular papule c/w angioma      Pigmented verrucoid papule/plaque c/w seborrheic keratosis      Yellow umbilicated papule c/w sebaceous hyperplasia      Irregularly shaped tan macule c/w lentigo     1-2 mm smooth white papules consistent with Milia      Movable subcutaneous cyst with punctum c/w epidermal inclusion cyst      Subcutaneous movable cyst c/w pilar cyst      Firm pink to brown papule c/w dermatofibroma      Pedunculated fleshy papule(s) c/w skin tag(s)      Evenly pigmented macule c/w junctional nevus     Mildly variegated pigmented, slightly irregular-bordered macule c/w mildly atypical nevus      Flesh colored to evenly pigmented papule c/w intradermal nevus       Pink pearly papule/plaque c/w basal cell carcinoma      Erythematous hyperkeratotic cursted plaque c/w SCC      Surgical scar with no sign of skin cancer recurrence      Open and closed comedones      Inflammatory papules and pustules      Verrucoid papule consistent consistent with wart     Erythematous eczematous patches and plaques     Dystrophic onycholytic nail with subungual debris c/w onychomycosis     Umbilicated papule    Erythematous-base heme-crusted tan verrucoid plaque consistent with inflamed seborrheic keratosis     Erythematous Silvery Scaling Plaque c/w Psoriasis     See annotation      Biopsy back        Assessment / Plan:           1. Other systemic lupus erythematosus with other organ involvement        2. Dermatitis, unspecified  Specimen to Pathology, Dermatology      3. Dermatofibroma            1. Other systemic lupus erythematosus with other organ involvement  Patient with history of lupus on plaquenil 200 mg daily presents for acute rash of upper back, chest, arms. Rash is photodistributed with erythematous-violaceous thin papules concerning for cutaneous lupus. Shave biopsy performed today. Patient to continue  topicals, no refills needed, and will re-evaluate pending path. If lupus, patient can likely increase her plaquenil and we will message her rheumatologist regarding this.  - Continue plaquenil 200 mg daily  - Continue desonide 0.05% cream to face twice daily  - Continue pimecrolimus 1% cream applied twice daily  - Continue clobetasol 0.05% solution to scalp twice daily  - Continue betamethasone 0.05% cream applied to trunk twice daily for up to 2-4 weeks at a time  - Counseled on extensive sun protection SPF minimum greater than 30 and sun avoidance      2. Dermatitis, unspecified  - Specimen to Pathology, Dermatology    Shave biopsy procedure note:    Shave biopsy performed after verbal consent including risk of infection, scar, recurrence, need for additional treatment of site. Area prepped with alcohol, anesthetized with approximately 1.0cc of 1% lidocaine with epinephrine. Lesional tissue shaved with razor blade. Hemostasis achieved with application of aluminum chloride followed by hyfrecation. No complications. Dressing applied. Wound care explained.    3. Dermatofibroma  This is a benign scar-like lesion secondary to minor trauma. No treatment required.       RTC 2 weeks SR      Renea Acevedo MD  [facility] Dermatology PGY-II

## 2023-05-01 NOTE — PATIENT INSTRUCTIONS

## 2023-05-11 LAB
FINAL PATHOLOGIC DIAGNOSIS: NORMAL
GROSS: NORMAL
Lab: NORMAL
MICROSCOPIC EXAM: NORMAL

## 2023-05-12 NOTE — PROGRESS NOTES
Skin,  back, punch biopsy:   -Interface and perivascular dermatitis, see comment     Comment: The findings most represent subacute cutaneous lupus, however another connective tissue disease cannot be ruled out. There is absence of follicular plugging, adherent scale and epidermal atrophy which is normally seen in the discoid variant of   lupus.

## 2023-05-15 ENCOUNTER — CLINICAL SUPPORT (OUTPATIENT)
Dept: DERMATOLOGY | Facility: CLINIC | Age: 48
End: 2023-05-15
Payer: COMMERCIAL

## 2023-05-15 ENCOUNTER — LAB VISIT (OUTPATIENT)
Dept: LAB | Facility: HOSPITAL | Age: 48
End: 2023-05-15
Attending: INTERNAL MEDICINE
Payer: COMMERCIAL

## 2023-05-15 ENCOUNTER — OFFICE VISIT (OUTPATIENT)
Dept: RHEUMATOLOGY | Facility: CLINIC | Age: 48
End: 2023-05-15
Payer: COMMERCIAL

## 2023-05-15 VITALS
WEIGHT: 156.31 LBS | BODY MASS INDEX: 29.51 KG/M2 | DIASTOLIC BLOOD PRESSURE: 74 MMHG | SYSTOLIC BLOOD PRESSURE: 115 MMHG | HEIGHT: 61 IN | HEART RATE: 76 BPM

## 2023-05-15 DIAGNOSIS — M32.9 SYSTEMIC LUPUS ERYTHEMATOSUS, UNSPECIFIED SLE TYPE, UNSPECIFIED ORGAN INVOLVEMENT STATUS: ICD-10-CM

## 2023-05-15 DIAGNOSIS — M32.19 OTHER SYSTEMIC LUPUS ERYTHEMATOSUS WITH OTHER ORGAN INVOLVEMENT: Primary | ICD-10-CM

## 2023-05-15 DIAGNOSIS — M32.19 OTHER SYSTEMIC LUPUS ERYTHEMATOSUS WITH OTHER ORGAN INVOLVEMENT: ICD-10-CM

## 2023-05-15 DIAGNOSIS — L30.8 OTHER ECZEMA: ICD-10-CM

## 2023-05-15 DIAGNOSIS — Z48.02 VISIT FOR SUTURE REMOVAL: Primary | ICD-10-CM

## 2023-05-15 LAB — RESEARCH LAB: NORMAL

## 2023-05-15 PROCEDURE — 99999 PR PBB SHADOW E&M-EST. PATIENT-LVL III: ICD-10-PCS | Mod: PBBFAC,,, | Performed by: INTERNAL MEDICINE

## 2023-05-15 PROCEDURE — 99999 PR PBB SHADOW E&M-EST. PATIENT-LVL I: CPT | Mod: PBBFAC,,,

## 2023-05-15 PROCEDURE — 1160F RVW MEDS BY RX/DR IN RCRD: CPT | Mod: CPTII,S$GLB,, | Performed by: INTERNAL MEDICINE

## 2023-05-15 PROCEDURE — 99999 PR PBB SHADOW E&M-EST. PATIENT-LVL III: CPT | Mod: PBBFAC,,, | Performed by: INTERNAL MEDICINE

## 2023-05-15 PROCEDURE — 99024 POSTOP FOLLOW-UP VISIT: CPT | Mod: S$GLB,,, | Performed by: DERMATOLOGY

## 2023-05-15 PROCEDURE — 99214 OFFICE O/P EST MOD 30 MIN: CPT | Mod: S$GLB,,, | Performed by: INTERNAL MEDICINE

## 2023-05-15 PROCEDURE — 99999 PR PBB SHADOW E&M-EST. PATIENT-LVL I: ICD-10-PCS | Mod: PBBFAC,,,

## 2023-05-15 PROCEDURE — 99214 PR OFFICE/OUTPT VISIT, EST, LEVL IV, 30-39 MIN: ICD-10-PCS | Mod: S$GLB,,, | Performed by: INTERNAL MEDICINE

## 2023-05-15 PROCEDURE — 99024 PR POST-OP FOLLOW-UP VISIT: ICD-10-PCS | Mod: S$GLB,,, | Performed by: DERMATOLOGY

## 2023-05-15 PROCEDURE — 86147 CARDIOLIPIN ANTIBODY EA IG: CPT | Performed by: INTERNAL MEDICINE

## 2023-05-15 PROCEDURE — 36415 COLL VENOUS BLD VENIPUNCTURE: CPT | Performed by: INTERNAL MEDICINE

## 2023-05-15 PROCEDURE — 3008F PR BODY MASS INDEX (BMI) DOCUMENTED: ICD-10-PCS | Mod: CPTII,S$GLB,, | Performed by: INTERNAL MEDICINE

## 2023-05-15 PROCEDURE — 3078F DIAST BP <80 MM HG: CPT | Mod: CPTII,S$GLB,, | Performed by: INTERNAL MEDICINE

## 2023-05-15 PROCEDURE — 1159F PR MEDICATION LIST DOCUMENTED IN MEDICAL RECORD: ICD-10-PCS | Mod: CPTII,S$GLB,, | Performed by: INTERNAL MEDICINE

## 2023-05-15 PROCEDURE — 3074F PR MOST RECENT SYSTOLIC BLOOD PRESSURE < 130 MM HG: ICD-10-PCS | Mod: CPTII,S$GLB,, | Performed by: INTERNAL MEDICINE

## 2023-05-15 PROCEDURE — 1159F MED LIST DOCD IN RCRD: CPT | Mod: CPTII,S$GLB,, | Performed by: INTERNAL MEDICINE

## 2023-05-15 PROCEDURE — 1160F PR REVIEW ALL MEDS BY PRESCRIBER/CLIN PHARMACIST DOCUMENTED: ICD-10-PCS | Mod: CPTII,S$GLB,, | Performed by: INTERNAL MEDICINE

## 2023-05-15 PROCEDURE — 3074F SYST BP LT 130 MM HG: CPT | Mod: CPTII,S$GLB,, | Performed by: INTERNAL MEDICINE

## 2023-05-15 PROCEDURE — 3078F PR MOST RECENT DIASTOLIC BLOOD PRESSURE < 80 MM HG: ICD-10-PCS | Mod: CPTII,S$GLB,, | Performed by: INTERNAL MEDICINE

## 2023-05-15 PROCEDURE — 3008F BODY MASS INDEX DOCD: CPT | Mod: CPTII,S$GLB,, | Performed by: INTERNAL MEDICINE

## 2023-05-15 RX ORDER — HYDROXYCHLOROQUINE SULFATE 200 MG/1
300 TABLET, FILM COATED ORAL DAILY
Qty: 135 TABLET | Refills: 1 | Status: SHIPPED | OUTPATIENT
Start: 2023-05-15 | End: 2023-11-25 | Stop reason: SDUPTHER

## 2023-05-15 ASSESSMENT — ROUTINE ASSESSMENT OF PATIENT INDEX DATA (RAPID3)
PSYCHOLOGICAL DISTRESS SCORE: 3.3
PAIN SCORE: 1
FATIGUE SCORE: 1
TOTAL RAPID3 SCORE: 0.67
PATIENT GLOBAL ASSESSMENT SCORE: 1
MDHAQ FUNCTION SCORE: 0
AM STIFFNESS SCORE: 0, NO

## 2023-05-15 NOTE — PROGRESS NOTES
Rapid3 Question Responses and Scores 5/14/2023   MDHAQ Score 0   Psychologic Score 3.3   Pain Score 1   When you awakened in the morning OVER THE LAST WEEK, did you feel stiff? No   If Yes, please indicate the number of hours until you are as limber as you will be for the day -   Fatigue Score 1   Global Health Score 1   RAPID3 Score 0.67      Answers submitted by the patient for this visit:  Rheumatology Questionnaire (Submitted on 5/14/2023)  fever: No  eye redness: No  mouth sores: No  headaches: No  shortness of breath: No  chest pain: No  trouble swallowing: No  diarrhea: No  constipation: No  unexpected weight change: No  genital sore: No  dysuria: No  During the last 3 days, have you had a skin rash?: Yes  Bruises or bleeds easily: No  cough: No

## 2023-05-15 NOTE — PROGRESS NOTES
"Subjective:       Patient ID: Mckenna Peraza is a 47 y.o. female.    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   HENT:  Negative for mouth sores and trouble swallowing.    Eyes:  Negative for redness.   Respiratory:  Negative for cough and shortness of breath.    Cardiovascular:  Negative for chest pain.   Gastrointestinal:  Negative for constipation and diarrhea.   Genitourinary:  Negative for dysuria and genital sores.   Skin:  Positive for rash.   Neurological:  Negative for headaches.   Hematological:  Does not bruise/bleed easily.       Rapid3 Question Responses and Scores 5/14/2023   MDHAQ Score 0   Psychologic Score 3.3   Pain Score 1   When you awakened in the morning OVER THE LAST WEEK, did you feel stiff? No   If Yes, please indicate the number of hours until you are as limber as you will be for the day -   Fatigue Score 1   Global Health Score 1   RAPID3 Score 0.67      Objective:   /74   Pulse 76   Ht 5' 1" (1.549 m)   Wt 70.9 kg (156 lb 4.9 oz)   BMI 29.53 kg/m²      Physical Exam      Assessment:       1. Other systemic lupus erythematosus with other organ involvement    2. Systemic lupus erythematosus, unspecified SLE type, unspecified organ involvement status    3. Other eczema            Plan:       Problem List Items Addressed This Visit          Active Problems    Systemic lupus erythematosus - Primary     Now with rash of SCLE but no other active symptoms of SLE; will obtain lab to complete assessment of lupus but SLEDAI likely 2 for rash    Advise increase hydroxychloroquine back to 300 " mg/d to relieve and control SCLE rash    Continue annual eye check for hydroxychloroquine    She agreed to participate in lupus study in which she will donate 2 tubes of blood and stool sample    RTC me 6 mo           Relevant Medications    hydrOXYchloroQUINE (PLAQUENIL) 200 mg tablet    Other Relevant Orders    Cardiolipin antibody    Protein/Creatinine Ratio, Urine    Urinalysis    Eczema     Eczema apparently represents a manifestation of SCLE that necessitates increasing hydroxychloroquine

## 2023-05-15 NOTE — ASSESSMENT & PLAN NOTE
Eczema apparently represents a manifestation of SCLE that necessitates increasing hydroxychloroquine

## 2023-05-15 NOTE — ASSESSMENT & PLAN NOTE
Now with rash of SCLE but no other active symptoms of SLE; will obtain lab to complete assessment of lupus but SLEDAI likely 2 for rash    Advise increase hydroxychloroquine back to 300 mg/d to relieve and control SCLE rash    Continue annual eye check for hydroxychloroquine    She agreed to participate in lupus study in which she will donate 2 tubes of blood and stool sample    RTC me 6 mo

## 2023-05-19 LAB
CARDIOLIPIN IGG SER IA-ACNC: <9.4 GPL (ref 0–14.99)
CARDIOLIPIN IGM SER IA-ACNC: <9.4 MPL (ref 0–12.49)

## 2023-05-24 NOTE — PROGRESS NOTES
CC: 47 y.o.female patient is here for suture removal.     HPI: Patient is s/p punch biopsy/excision of back on 05/01/2023.  Patient reports no problems.    WOUND PE:  Sutures intact.  Wound healing well.  Good approximation of skin edges.  No signs or symptoms of infection.    IMPRESSION:  Skin, back, punch biopsy: -Interface and perivascular dermatitis, see comment    PLAN:  Sutures removed today.  Continue wound care.    RTC: In 6 month(s) for skin check or sooner should any new concerns arise

## 2023-06-21 ENCOUNTER — OFFICE VISIT (OUTPATIENT)
Dept: PRIMARY CARE CLINIC | Facility: CLINIC | Age: 48
End: 2023-06-21
Payer: COMMERCIAL

## 2023-06-21 VITALS
BODY MASS INDEX: 28.64 KG/M2 | WEIGHT: 151.69 LBS | OXYGEN SATURATION: 100 % | HEIGHT: 61 IN | HEART RATE: 83 BPM | DIASTOLIC BLOOD PRESSURE: 68 MMHG | SYSTOLIC BLOOD PRESSURE: 118 MMHG

## 2023-06-21 DIAGNOSIS — Z00.00 WELLNESS EXAMINATION: Primary | ICD-10-CM

## 2023-06-21 DIAGNOSIS — R05.9 COUGH, UNSPECIFIED TYPE: ICD-10-CM

## 2023-06-21 DIAGNOSIS — D50.0 IRON DEFICIENCY ANEMIA DUE TO CHRONIC BLOOD LOSS: ICD-10-CM

## 2023-06-21 DIAGNOSIS — M32.19 OTHER SYSTEMIC LUPUS ERYTHEMATOSUS WITH OTHER ORGAN INVOLVEMENT: ICD-10-CM

## 2023-06-21 PROCEDURE — 99213 PR OFFICE/OUTPT VISIT, EST, LEVL III, 20-29 MIN: ICD-10-PCS | Mod: 25,S$GLB,, | Performed by: INTERNAL MEDICINE

## 2023-06-21 PROCEDURE — 3078F PR MOST RECENT DIASTOLIC BLOOD PRESSURE < 80 MM HG: ICD-10-PCS | Mod: CPTII,S$GLB,, | Performed by: INTERNAL MEDICINE

## 2023-06-21 PROCEDURE — 1159F PR MEDICATION LIST DOCUMENTED IN MEDICAL RECORD: ICD-10-PCS | Mod: CPTII,S$GLB,, | Performed by: INTERNAL MEDICINE

## 2023-06-21 PROCEDURE — 3008F BODY MASS INDEX DOCD: CPT | Mod: CPTII,S$GLB,, | Performed by: INTERNAL MEDICINE

## 2023-06-21 PROCEDURE — 99213 OFFICE O/P EST LOW 20 MIN: CPT | Mod: 25,S$GLB,, | Performed by: INTERNAL MEDICINE

## 2023-06-21 PROCEDURE — 99999 PR PBB SHADOW E&M-EST. PATIENT-LVL III: CPT | Mod: PBBFAC,,, | Performed by: INTERNAL MEDICINE

## 2023-06-21 PROCEDURE — 3078F DIAST BP <80 MM HG: CPT | Mod: CPTII,S$GLB,, | Performed by: INTERNAL MEDICINE

## 2023-06-21 PROCEDURE — 3008F PR BODY MASS INDEX (BMI) DOCUMENTED: ICD-10-PCS | Mod: CPTII,S$GLB,, | Performed by: INTERNAL MEDICINE

## 2023-06-21 PROCEDURE — 99396 PR PREVENTIVE VISIT,EST,40-64: ICD-10-PCS | Mod: S$GLB,,, | Performed by: INTERNAL MEDICINE

## 2023-06-21 PROCEDURE — 3074F PR MOST RECENT SYSTOLIC BLOOD PRESSURE < 130 MM HG: ICD-10-PCS | Mod: CPTII,S$GLB,, | Performed by: INTERNAL MEDICINE

## 2023-06-21 PROCEDURE — 99396 PREV VISIT EST AGE 40-64: CPT | Mod: S$GLB,,, | Performed by: INTERNAL MEDICINE

## 2023-06-21 PROCEDURE — 99999 PR PBB SHADOW E&M-EST. PATIENT-LVL III: ICD-10-PCS | Mod: PBBFAC,,, | Performed by: INTERNAL MEDICINE

## 2023-06-21 PROCEDURE — 3074F SYST BP LT 130 MM HG: CPT | Mod: CPTII,S$GLB,, | Performed by: INTERNAL MEDICINE

## 2023-06-21 PROCEDURE — 1159F MED LIST DOCD IN RCRD: CPT | Mod: CPTII,S$GLB,, | Performed by: INTERNAL MEDICINE

## 2023-06-21 RX ORDER — METHYLPREDNISOLONE 4 MG/1
TABLET ORAL
Qty: 21 EACH | Refills: 0 | Status: SHIPPED | OUTPATIENT
Start: 2023-06-21 | End: 2023-07-12

## 2023-06-21 NOTE — PATIENT INSTRUCTIONS
OTC antihistamine (Zyrtec, Claritin, Allegra, or Xyxal) and a steroid nasal spray such as flonase

## 2023-06-21 NOTE — PROGRESS NOTES
[facility] Note    Chief Complaint      Chief Complaint   Patient presents with    Annual Exam    Cough     X 3 weeks turned into dry cough and keeping her up at night      History of Present Illness      Mckenna Peraza is a 47 y.o. female who presents today for chief complaint annual wellness .     HPI   Last seen 4.22 annual   Cbc iron studues in Dec due for cmp lipids   SLE sees rheum is on HCQ off of prednisone +REAGAN/SSA, low +IgM APA x1, tried to reduce but had flare of cutaneous lupus proven on biopsy. She prev thought this was eczema   PROSPER sees Dr Vimal CARRION she is still on PO iron, she has labs next week. Menorrhagia she has since removal; of IUD   Pap: 10..20 Tila Herb   cologuard 4.22    Cough x3 weeks is dry but is keeping her up at night and botehring her, is improved. She has taken otc cough suppressant. See relevant sections below for relavant PE and ROS related to this complaint      Active Problem List with Overview Notes    Diagnosis Date Noted    Chronic right shoulder pain 04/12/2022    Pain of left sacroiliac joint 04/12/2022    Menorrhagia with regular cycle 12/30/2020    Ventral hernia without obstruction or gangrene 11/17/2020    Systemic lupus erythematosus 11/12/2020     Pt with initial presentation of diffuse arthralgias  - Found to have REAGAN 1:640 and +SSA antibody as well as positive anitphospholipid antibody  - SLE (2019 EULAR/ACR): 8 [Positive RAEGAN, Synovitis and APS antibody]  - 2020 started hydroxychloroquine for presentation is consistent with early SLE  - 2023 developed SCLE when on lower dose of hydroxychloroquine       Iron deficiency anemia due to chronic blood loss 10/27/2020    Eczema 10/27/2020       Health Maintenance   Topic Date Due    TETANUS VACCINE  Never done    Mammogram  11/02/2023    Lipid Panel  04/14/2027    Hepatitis C Screening  Completed       Past Medical History:   Diagnosis Date    Abnormal Pap smear of cervix        Past Surgical  History:   Procedure Laterality Date    CERVICAL BIOPSY  W/ LOOP ELECTRODE EXCISION       SECTION      [age]    HERNIA REPAIR      LEFT OOPHORECTOMY         family history includes Diabetes in her brother, brother, and mother; Heart disease in her mother.    Social History     Tobacco Use    Smoking status: Never     Passive exposure: Never    Smokeless tobacco: Never   Substance Use Topics    Alcohol use: Not Currently     Comment: Only drink about 4 to 5 times a year    Drug use: Never       Review of Systems   Constitutional:  Negative for chills, fever, malaise/fatigue and weight loss.   Respiratory:  Negative for cough, sputum production, shortness of breath and wheezing.    Cardiovascular:  Negative for chest pain, palpitations, orthopnea and leg swelling.   Gastrointestinal:  Negative for constipation, diarrhea, nausea and vomiting.   Genitourinary:  Negative for dysuria, frequency, hematuria and urgency.      Outpatient Encounter Medications as of 2023   Medication Sig Dispense Refill    betamethasone dipropionate 0.05 % cream Apply topically 2 (two) times daily. To affected areas on body for 2-4 weeks per course only as needed for rash 45 g 1    clobetasoL (TEMOVATE) 0.05 % external solution Use on scalp one - two times daily as needed for scaling or itching for up to 2-4 weeks per course 50 mL 3    desonide (DESOWEN) 0.05 % cream To affected areas on face BID as needed for rash for only up to 1 week per course 15 g 0    ferrous sulfate (FEOSOL) 325 mg (65 mg iron) Tab tablet Take 1 tablet (325 mg total) by mouth once daily. 90 tablet 3    hydrOXYchloroQUINE (PLAQUENIL) 200 mg tablet Take 1.5 tablets (300 mg total) by mouth once daily. 135 tablet 1    pimecrolimus (ELIDEL) 1 % cream Apply topically 2 (two) times daily. To affected areas on face as needed for rash 30 g 2    triamcinolone acetonide 0.1% (KENALOG) 0.1 % ointment Apply 1 application topically 2 (two) times daily.       "methylPREDNISolone (MEDROL DOSEPACK) 4 mg tablet use as directed 21 each 0    [DISCONTINUED] clobetasoL (TEMOVATE) 0.05 % external solution Apply topically once daily.      [DISCONTINUED] hydrOXYzine HCL (ATARAX) 10 MG Tab Take 1 tablet (10 mg total) by mouth 3 (three) times daily as needed (itching). Caution, will make you drowsy. (Patient not taking: Reported on 6/21/2023) 30 tablet 0     Facility-Administered Encounter Medications as of 6/21/2023   Medication Dose Route Frequency Provider Last Rate Last Admin    levonorgestreL 20 mcg/24 hours (5 yrs) 52 mg IUD 1 Intra Uterine Device  1 each Intrauterine 1 time in Clinic/HOD Tila Estevez MD            Review of patient's allergies indicates:  No Known Allergies        Physical Exam      Vital Signs  Pulse: 83  SpO2: 100 %  BP: 118/68  BP Location: Right arm  Patient Position: Sitting  Height and Weight  Height: 5' 1" (154.9 cm)  Weight: 68.8 kg (151 lb 10.8 oz)  BSA (Calculated - sq m): 1.72 sq meters  BMI (Calculated): 28.7  Weight in (lb) to have BMI = 25: 132]    Physical Exam  Vitals reviewed.   Constitutional:       General: She is not in acute distress.     Appearance: She is well-developed. She is not diaphoretic.   HENT:      Head: Normocephalic and atraumatic.      Right Ear: External ear normal. Tympanic membrane is bulging.      Left Ear: External ear normal. Tympanic membrane is bulging.      Nose: Nose normal.      Mouth/Throat:      Pharynx: Oropharyngeal exudate and posterior oropharyngeal erythema present. No pharyngeal swelling.   Eyes:      General:         Right eye: No discharge.         Left eye: No discharge.      Conjunctiva/sclera: Conjunctivae normal.   Cardiovascular:      Rate and Rhythm: Normal rate and regular rhythm.      Heart sounds: Normal heart sounds.   Pulmonary:      Effort: Pulmonary effort is normal. No respiratory distress.      Breath sounds: Normal breath sounds.   Musculoskeletal:         General: Normal range of " motion.      Cervical back: Normal range of motion.   Skin:     Coloration: Skin is not pale.      Findings: No rash.   Neurological:      Mental Status: She is alert and oriented to person, place, and time.   Psychiatric:         Behavior: Behavior normal.         Thought Content: Thought content normal.        Laboratory:  CBC:  No results for input(s): WBC, RBC, HGB, HCT, PLT, MCV, MCH, MCHC in the last 2160 hours.  CMP:  No results for input(s): GLU, CALCIUM, ALBUMIN, PROT, NA, K, CO2, CL, BUN, ALKPHOS, ALT, AST, BILITOT in the last 2160 hours.    Invalid input(s): CREATININ  URINALYSIS:  No results for input(s): COLORU, CLARITYU, SPECGRAV, PHUR, PROTEINUA, GLUCOSEU, BILIRUBINCON, BLOODU, WBCU, RBCU, BACTERIA, MUCUS, NITRITE, LEUKOCYTESUR, UROBILINOGEN, HYALINECASTS in the last 2160 hours.   LIPIDS:  No results for input(s): TSH, HDL, CHOL, TRIG, LDLCALC, CHOLHDL, NONHDLCHOL, TOTALCHOLEST in the last 2160 hours.  TSH:  No results for input(s): TSH in the last 2160 hours.  A1C:  No results for input(s): HGBA1C in the last 2160 hours.    Assessment/Plan     Mckenna Peraza is a 47 y.o.female with:    1. Wellness examination  -     Lipid Panel; Future; Expected date: 06/21/2023  -     Comprehensive Metabolic Panel; Future; Expected date: 06/21/2023    2. Cough, unspecified type  -     methylPREDNISolone (MEDROL DOSEPACK) 4 mg tablet; use as directed  Dispense: 21 each; Refill: 0    3. Other systemic lupus erythematosus with other organ involvement  Overview:  Pt with initial presentation of diffuse arthralgias  - Found to have REAGAN 1:640 and +SSA antibody as well as positive anitphospholipid antibody  - SLE (2019 EULAR/ACR): 8 [Positive REAGAN, Synovitis and APS antibody]  - 2020 started hydroxychloroquine for presentation is consistent with early SLE  - 2023 developed SCLE when on lower dose of hydroxychloroquine     Assessment & Plan:  Per rheum       4. Iron deficiency anemia due to chronic blood loss  Assessment &  Plan:  Improved   Follow up with HO            Use of the [employer] Patient Portal discussed and encouraged during today's visit  -Continue current medications and maintain follow up with specialists.  Return to clinic in 1 year prn .  Future Appointments   Date Time Provider Department Center   6/26/2023  9:30 AM LAB, [facility]   6/27/2023  9:40 AM Len Celis MD [facility] HEM ONC [facility]   8/2/2023  9:30 AM iTla Estevez MD [facility] [id number]       Faby Zambrano MD  6/21/2023 9:43 AM    Primary Care Internal Medicine

## 2023-06-26 NOTE — PROGRESS NOTES
PATIENT: Mckenna Peraza  MRN: [id number]  DATE: 6/27/2023    Diagnosis:   1. Iron deficiency anemia due to chronic blood loss    2. Menorrhagia with regular cycle    3. Other systemic lupus erythematosus with other organ involvement      Chief Complaint: iron deficiency anemia    Subjective:    History of Present Illness: Ms. Peraza is a 47 y.o. female who presented in October 2020 for evaluation and management of iron deficiency anemia. She was referred by Dr. Zambrano.    Telemedicine visit:  The patient location is: home  The chief complaint leading to consultation is: iron deficiency anemia.    Visit type: audiovisual    Face to Face time with patient: 5 minutes  10 minutes of total time spent on the encounter, which includes face to face time and non-face to face time preparing to see the patient (eg, review of tests), Obtaining and/or reviewing separately obtained history, Documenting clinical information in the electronic or other health record, Independently interpreting results (not separately reported) and communicating results to the patient/family/caregiver, or Care coordination (not separately reported).     Each patient to whom he or she provides medical services by telemedicine is:  (1) informed of the relationship between the physician and patient and the respective role of any other health care provider with respect to management of the patient; and (2) notified that he or she may decline to receive medical services by telemedicine and may withdraw from such care at any time.    Notes: see below      - labs in October 2020 reveal a microcytic anemia with intermittent thrombocytosis.  - iron studies (10/19/20) reveal a decreased ferritin/iron/saturated iron.  - she received four doses of iron sucrose in November 2020    Interval history:  - she presents for a follow-up appointment for her iron deficiency anemia.  - since last visit, she has been taking ferrous sulfate daily.  - today, she is doing  well. She notes fatigue right before a period. Otherwise, she denies shortness of breath, chest pain, nausea, vomiting, diarrhea, constipation.      Past medical, surgical, family, and social histories have been reviewed and updated below.    Past Medical History:   Past Medical History:   Diagnosis Date    Abnormal Pap smear of cervix        Past Surgical History:   Past Surgical History:   Procedure Laterality Date    CERVICAL BIOPSY  W/ LOOP ELECTRODE EXCISION       SECTION      [age]    HERNIA REPAIR      LEFT OOPHORECTOMY         Family History:   Family History   Problem Relation Age of Onset    Diabetes Mother     Heart disease Mother     Diabetes Brother     Diabetes Brother     Breast cancer Neg Hx     Ovarian cancer Neg Hx     Colon cancer Neg Hx     Endometrial cancer Neg Hx        Social History:  reports that she has never smoked. She has never been exposed to tobacco smoke. She has never used smokeless tobacco. She reports that she does not currently use alcohol. She reports that she does not use drugs.    Allergies:  Review of patient's allergies indicates:  No Known Allergies    Medications:  Current Outpatient Medications   Medication Sig Dispense Refill    betamethasone dipropionate 0.05 % cream Apply topically 2 (two) times daily. To affected areas on body for 2-4 weeks per course only as needed for rash 45 g 1    clobetasoL (TEMOVATE) 0.05 % external solution Use on scalp one - two times daily as needed for scaling or itching for up to 2-4 weeks per course 50 mL 3    desonide (DESOWEN) 0.05 % cream To affected areas on face BID as needed for rash for only up to 1 week per course 15 g 0    ferrous sulfate (FEOSOL) 325 mg (65 mg iron) Tab tablet Take 1 tablet (325 mg total) by mouth once daily. 90 tablet 3    hydrOXYchloroQUINE (PLAQUENIL) 200 mg tablet Take 1.5 tablets (300 mg total) by mouth once daily. 135 tablet 1    methylPREDNISolone (MEDROL DOSEPACK) 4 mg tablet follow package  directions 21 each 0    pimecrolimus (ELIDEL) 1 % cream Apply topically 2 (two) times daily. To affected areas on face as needed for rash 30 g 2    triamcinolone acetonide 0.1% (KENALOG) 0.1 % ointment Apply 1 application topically 2 (two) times daily.       Current Facility-Administered Medications   Medication Dose Route Frequency Provider Last Rate Last Admin    levonorgestreL 20 mcg/24 hours (5 yrs) 52 mg IUD 1 Intra Uterine Device  1 each Intrauterine 1 time in Clinic/HOD Tila Estevez MD           Review of Systems   Constitutional:  Negative for unexpected weight change.   HENT:  Negative for sore throat.    Eyes:  Negative for visual disturbance.   Respiratory:  Negative for cough and shortness of breath.    Cardiovascular:  Negative for chest pain.   Gastrointestinal:  Negative for abdominal pain, constipation, diarrhea, nausea and vomiting.   Genitourinary:  Negative for dysuria and menstrual problem.   Musculoskeletal:  Negative for back pain.   Skin:  Negative for rash.   Neurological:  Negative for headaches.   Hematological:  Negative for adenopathy.   Psychiatric/Behavioral:  The patient is not nervous/anxious.      ECOG Performance Status:   ECOG SCORE 0       Objective:      Vitals: There were no vitals filed for this visit.  BMI: There is no height or weight on file to calculate BMI.  Deferred since telemedicine visit    Physical Exam  Deferred since telemedicine visit    Laboratory Data:  Labs have been reviewed.    Lab Results   Component Value Date    WBC 9.47 06/26/2023    WBC 9.47 06/26/2023    HGB 13.9 06/26/2023    HGB 13.9 06/26/2023    HCT 42.0 06/26/2023    HCT 42.0 06/26/2023    MCV 98 06/26/2023    MCV 98 06/26/2023     06/26/2023     06/26/2023         Imaging:    Assessment:       1. Iron deficiency anemia due to chronic blood loss    2. Menorrhagia with regular cycle    3. Other systemic lupus erythematosus with other organ involvement           Plan:     1. Iron  deficiency anemia due to chronic blood loss / reactive thrombocytosis  - I have reviewed her chart.  - labs in October 2020 reveal a microcytic anemia with intermittent thrombocytosis.  - iron studies (10/19/20) reveal a decreased ferritin/iron/saturated iron.  - given the severity of her iron deficiency anemia, I recommended iron sucrose weekly x 4 doses.  - she received four doses of iron sucrose in November 2020  - her periods have resumed since having her IUD removed.  - Labs have been reviewed. Hemoglobin is 13.9 g/dL. Ferritin has increased from 14 ng/mL to 21 ng/mL.  - I offered to set her up for more IV iron, but she does not want more IV iron at this time.  - continue ferrous sulfate  - return to clinic in 6 months with repeat labs.     2. Menorrhagia with regular cycles.  - the cause of her iron deficiency anemia.  - her periods have resumed since having her IUD removed.  - since her periods have resumed, we decided to monitor labs every 6 months.  - defer management to gynecology.    3. Lupus  - stable. On hydroxychloroquine  - defer management to rheumatology.    - return to clinic in 6 months with repeat labs.    Len Celis M.D.  Hematology/Oncology  [facility] - [address], [address]  [place] [address]  Phone: [phone]  Fax: [phone]

## 2023-06-27 ENCOUNTER — OFFICE VISIT (OUTPATIENT)
Dept: HEMATOLOGY/ONCOLOGY | Facility: CLINIC | Age: 48
End: 2023-06-27
Payer: COMMERCIAL

## 2023-06-27 DIAGNOSIS — N92.0 MENORRHAGIA WITH REGULAR CYCLE: ICD-10-CM

## 2023-06-27 DIAGNOSIS — D50.0 IRON DEFICIENCY ANEMIA DUE TO CHRONIC BLOOD LOSS: Primary | ICD-10-CM

## 2023-06-27 DIAGNOSIS — M32.19 OTHER SYSTEMIC LUPUS ERYTHEMATOSUS WITH OTHER ORGAN INVOLVEMENT: ICD-10-CM

## 2023-06-27 PROCEDURE — 1160F RVW MEDS BY RX/DR IN RCRD: CPT | Mod: CPTII,95,, | Performed by: INTERNAL MEDICINE

## 2023-06-27 PROCEDURE — 1159F MED LIST DOCD IN RCRD: CPT | Mod: CPTII,95,, | Performed by: INTERNAL MEDICINE

## 2023-06-27 PROCEDURE — 1160F PR REVIEW ALL MEDS BY PRESCRIBER/CLIN PHARMACIST DOCUMENTED: ICD-10-PCS | Mod: CPTII,95,, | Performed by: INTERNAL MEDICINE

## 2023-06-27 PROCEDURE — 1159F PR MEDICATION LIST DOCUMENTED IN MEDICAL RECORD: ICD-10-PCS | Mod: CPTII,95,, | Performed by: INTERNAL MEDICINE

## 2023-06-27 PROCEDURE — 99213 OFFICE O/P EST LOW 20 MIN: CPT | Mod: 95,,, | Performed by: INTERNAL MEDICINE

## 2023-06-27 PROCEDURE — 99213 PR OFFICE/OUTPT VISIT, EST, LEVL III, 20-29 MIN: ICD-10-PCS | Mod: 95,,, | Performed by: INTERNAL MEDICINE

## 2023-08-14 ENCOUNTER — PATIENT MESSAGE (OUTPATIENT)
Dept: ADMINISTRATIVE | Facility: HOSPITAL | Age: 48
End: 2023-08-14
Payer: COMMERCIAL

## 2023-09-14 ENCOUNTER — HOSPITAL ENCOUNTER (OUTPATIENT)
Dept: RADIOLOGY | Facility: HOSPITAL | Age: 48
Discharge: HOME | End: 2023-09-14
Attending: OBSTETRICS & GYNECOLOGY
Payer: COMMERCIAL

## 2023-09-14 ENCOUNTER — OFFICE VISIT (OUTPATIENT)
Dept: OBSTETRICS AND GYNECOLOGY | Facility: CLINIC | Age: 48
End: 2023-09-14
Payer: COMMERCIAL

## 2023-09-14 VITALS
BODY MASS INDEX: 28.35 KG/M2 | WEIGHT: 150.13 LBS | HEIGHT: 61 IN | DIASTOLIC BLOOD PRESSURE: 71 MMHG | SYSTOLIC BLOOD PRESSURE: 117 MMHG

## 2023-09-14 DIAGNOSIS — Z12.31 BREAST CANCER SCREENING BY MAMMOGRAM: ICD-10-CM

## 2023-09-14 DIAGNOSIS — N63.21 MASS OF UPPER OUTER QUADRANT OF LEFT BREAST: ICD-10-CM

## 2023-09-14 DIAGNOSIS — N93.9 ABNORMAL UTERINE BLEEDING (AUB): ICD-10-CM

## 2023-09-14 DIAGNOSIS — E03.8 SUBCLINICAL HYPOTHYROIDISM: ICD-10-CM

## 2023-09-14 DIAGNOSIS — Z01.419 ENCOUNTER FOR ANNUAL ROUTINE GYNECOLOGICAL EXAMINATION: Primary | ICD-10-CM

## 2023-09-14 DIAGNOSIS — Z97.5 BREAKTHROUGH BLEEDING ASSOCIATED WITH INTRAUTERINE DEVICE (IUD): ICD-10-CM

## 2023-09-14 DIAGNOSIS — N92.1 BREAKTHROUGH BLEEDING ASSOCIATED WITH INTRAUTERINE DEVICE (IUD): ICD-10-CM

## 2023-09-14 PROCEDURE — 3008F BODY MASS INDEX DOCD: CPT | Mod: CPTII,S$GLB,, | Performed by: OBSTETRICS & GYNECOLOGY

## 2023-09-14 PROCEDURE — 1159F MED LIST DOCD IN RCRD: CPT | Mod: CPTII,S$GLB,, | Performed by: OBSTETRICS & GYNECOLOGY

## 2023-09-14 PROCEDURE — 1159F PR MEDICATION LIST DOCUMENTED IN MEDICAL RECORD: ICD-10-PCS | Mod: CPTII,S$GLB,, | Performed by: OBSTETRICS & GYNECOLOGY

## 2023-09-14 PROCEDURE — 3008F PR BODY MASS INDEX (BMI) DOCUMENTED: ICD-10-PCS | Mod: CPTII,S$GLB,, | Performed by: OBSTETRICS & GYNECOLOGY

## 2023-09-14 PROCEDURE — 3078F DIAST BP <80 MM HG: CPT | Mod: CPTII,S$GLB,, | Performed by: OBSTETRICS & GYNECOLOGY

## 2023-09-14 PROCEDURE — 3074F PR MOST RECENT SYSTOLIC BLOOD PRESSURE < 130 MM HG: ICD-10-PCS | Mod: CPTII,S$GLB,, | Performed by: OBSTETRICS & GYNECOLOGY

## 2023-09-14 PROCEDURE — 87624 HPV HI-RISK TYP POOLED RSLT: CPT | Performed by: OBSTETRICS & GYNECOLOGY

## 2023-09-14 PROCEDURE — 99396 PREV VISIT EST AGE 40-64: CPT | Mod: S$GLB,,, | Performed by: OBSTETRICS & GYNECOLOGY

## 2023-09-14 PROCEDURE — 76856 US EXAM PELVIC COMPLETE: CPT | Mod: 26,,, | Performed by: RADIOLOGY

## 2023-09-14 PROCEDURE — 3078F PR MOST RECENT DIASTOLIC BLOOD PRESSURE < 80 MM HG: ICD-10-PCS | Mod: CPTII,S$GLB,, | Performed by: OBSTETRICS & GYNECOLOGY

## 2023-09-14 PROCEDURE — 76830 US PELVIS COMP WITH TRANSVAG NON-OB (XPD): ICD-10-PCS | Mod: 26,,, | Performed by: RADIOLOGY

## 2023-09-14 PROCEDURE — 76856 US EXAM PELVIC COMPLETE: CPT | Mod: TC

## 2023-09-14 PROCEDURE — 88175 CYTOPATH C/V AUTO FLUID REDO: CPT | Performed by: OBSTETRICS & GYNECOLOGY

## 2023-09-14 PROCEDURE — 76830 TRANSVAGINAL US NON-OB: CPT | Mod: 26,,, | Performed by: RADIOLOGY

## 2023-09-14 PROCEDURE — 76856 US PELVIS COMP WITH TRANSVAG NON-OB (XPD): ICD-10-PCS | Mod: 26,,, | Performed by: RADIOLOGY

## 2023-09-14 PROCEDURE — 99396 PR PREVENTIVE VISIT,EST,40-64: ICD-10-PCS | Mod: S$GLB,,, | Performed by: OBSTETRICS & GYNECOLOGY

## 2023-09-14 PROCEDURE — 3074F SYST BP LT 130 MM HG: CPT | Mod: CPTII,S$GLB,, | Performed by: OBSTETRICS & GYNECOLOGY

## 2023-09-14 NOTE — PROGRESS NOTES
Chief Complaint: Well Woman Exam     HPI:      Mckenna Peraza is a 47 y.o.  who presents for annual exam. She is currently complaining of  menstrual irregularity since having Mirena IUD removed last years.  Has experience some frequent vaginal bleeding and breakthrough bleeding . Suspected a ruptured ovarian cyst with pelvic pain that spontaneously resolved. Denies complaints today.    Ms. Peraza is currently sexually active with a single male partner. She declines STD screening today. Patient does not have regular monthly menses. Patient's last menstrual period was 2023. She is currently using BTL for contraception.    Previous Pap:  no abnormalities (2020)  Previous Mammogram:  Results for orders placed during the hospital encounter of 22    Mammo Digital Screening Bilat w/ Paul    Narrative  Result:  Mammo Digital Screening Bilat w/ Paul    History:  Patient is 47 y.o. and is seen for a screening mammogram.      Films Compared:  Compared to: 10/25/2021 Mammo Digital Screening Bilat w/ Paul and 10/20/2020 Mammo Digital Screening Bilat w/ Paul    Findings:  This procedure was performed using tomosynthesis.  Computer-aided detection was utilized in the interpretation of this examination.    The breasts are heterogeneously dense, which may obscure small masses. There is no evidence of suspicious masses, microcalcifications or architectural distortion.    Impression  No mammographic evidence of malignancy.    BI-RADS Category 1: Negative    Recommendation:  Routine screening mammogram in 1 year is recommended.    Your estimated lifetime risk of breast cancer (to age 85) based on Tyrer-Cuzick risk assessment model is 7.12 %.  According to the American Cancer Society, patients with a lifetime breast cancer risk of 20% or higher might benefit from supplemental screening tests. ??     Most Recent Dexa: not indicated  Colon cancer screening UTD    COVID vaccine: completed series  Gardasil:Has  "never had     Patient Active Problem List   Diagnosis    Iron deficiency anemia due to chronic blood loss    Eczema    Systemic lupus erythematosus    Ventral hernia without obstruction or gangrene    Menorrhagia with regular cycle    Chronic right shoulder pain    Pain of left sacroiliac joint       Past Medical History:   Diagnosis Date    Abnormal Pap smear of cervix        Past Surgical History:   Procedure Laterality Date    CERVICAL BIOPSY  W/ LOOP ELECTRODE EXCISION       SECTION      [age]    HERNIA REPAIR      LEFT OOPHORECTOMY         OB History          5    Para   5    Term   5            AB        Living   5         SAB        IAB        Ectopic        Multiple        Live Births               Obstetric Comments   14/R/4-5  Hx abnormal pap, s/p LEEP 2011  Hx chlamydia  CD x 5, BTL               ROS:     Review of Systems   Constitutional:  Negative for activity change, appetite change and fatigue.   Respiratory:  Negative for shortness of breath.    Cardiovascular:  Negative for chest pain.   Gastrointestinal:  Negative for abdominal pain, constipation and diarrhea.   Endocrine: Negative for cold intolerance and heat intolerance.   Genitourinary:  Positive for menstrual irregularity. Negative for dysuria, frequency, hot flashes, pelvic pain, vaginal discharge and vaginal dryness.   Integumentary:  Negative for breast mass, breast discharge and breast tenderness.   Psychiatric/Behavioral:  Negative for dysphoric mood. The patient is not nervous/anxious.    Breast: Negative for mass and tenderness      Physical Exam:      PHYSICAL EXAM:  /71   Ht 5' 1" (1.549 m)   Wt 68.1 kg (150 lb 2.1 oz)   LMP 2023   BMI 28.37 kg/m²   Body mass index is 28.37 kg/m².     APPEARANCE: Well nourished, well developed, in no acute distress.  PSYCH: Appropriate mood and affect.  SKIN: No acne or hirsutism  NECK: Neck symmetric without masses or thyromegaly  NODES: No inguinal, axillary, or " supraclavicular lymph node enlargement  CHEST: Normal respiratory effort.  ABDOMEN: Soft.  No tenderness or masses.   BREASTS: Symmetrical, no skin changes or visible lesions.  No nipple discharge bilaterally. + small, well circumscribed mass in upper outer quadrant of left breast.   PELVIC: Normal external genitalia without lesions.  Normal hair distribution.  Adequate perineal body, normal urethral meatus.  Vagina moist and well rugated without lesions or discharge.  Cervix pink, without lesions, discharge or tenderness.  No significant cystocele or rectocele.  Bimanual exam shows uterus to be normal size, regular, mobile and nontender.  Adnexa without masses or tenderness.    EXTREMITIES: No edema.    Lab Results   Component Value Date    WBC 9.47 06/26/2023    WBC 9.47 06/26/2023    HGB 13.9 06/26/2023    HGB 13.9 06/26/2023    HCT 42.0 06/26/2023    HCT 42.0 06/26/2023    MCV 98 06/26/2023    MCV 98 06/26/2023     06/26/2023     06/26/2023       Lab Results   Component Value Date    IRON 96 06/26/2023    TRANSFERRIN 221 06/26/2023    TIBC 327 06/26/2023    FESATURATED 29 06/26/2023      Lab Results   Component Value Date    FERRITIN 21 06/26/2023     Lab Results   Component Value Date    TSH 0.068 (L) 06/26/2023    FREET4 0.97 06/26/2023         Assessment:     1. Encounter for annual routine gynecological examination  Liquid-Based Pap Smear, Screening    HPV High Risk Genotypes, PCR      2. Breast cancer screening by mammogram  CANCELED: Mammo Digital Screening Bilat w/ Paul      3. Breakthrough bleeding associated with intrauterine device (IUD)        4. Abnormal uterine bleeding (AUB)  POCT urine pregnancy    TSH    US Pelvis Comp with Transvag NON-OB (xpd    T4, FREE      5. Subclinical hypothyroidism  TSH    T4, FREE      6. Mass of upper outer quadrant of left breast  Mammo Digital Diagnostic Bilat with Paul    US Breast Left Limited            Plan:     Clinical breast exam performed.  Pap  collected.  Mammogram ordered.  DEXA at 64.  Colon cancer screening UTD.  Contraception: BTL.  AUB: workup as above  Follow up for results.    Counseling:     Patient was counseled today on current ASCCP pap guidelines, the recommendation for yearly pelvic exams, healthy diet and exercise routines, breast self awareness and annual mammograms. She is to see her PCP for other health maintenance.       Use of the [employer] Patient Portal discussed and encouraged during today's visit.         Tila Estevez MD  [facility]  09/14/2023

## 2023-09-19 LAB
FINAL PATHOLOGIC DIAGNOSIS: NORMAL
HPV HR 12 DNA SPEC QL NAA+PROBE: NEGATIVE
HPV16 AG SPEC QL: NEGATIVE
HPV18 DNA SPEC QL NAA+PROBE: NEGATIVE
Lab: NORMAL

## 2023-10-04 ENCOUNTER — HOSPITAL ENCOUNTER (OUTPATIENT)
Dept: RADIOLOGY | Facility: HOSPITAL | Age: 48
Discharge: HOME | End: 2023-10-04
Attending: OBSTETRICS & GYNECOLOGY
Payer: COMMERCIAL

## 2023-10-04 DIAGNOSIS — N63.21 MASS OF UPPER OUTER QUADRANT OF LEFT BREAST: ICD-10-CM

## 2023-10-04 PROCEDURE — 77066 MAMMO DIGITAL DIAGNOSTIC BILAT WITH TOMO: ICD-10-PCS | Mod: 26,,, | Performed by: RADIOLOGY

## 2023-10-04 PROCEDURE — 77062 MAMMO DIGITAL DIAGNOSTIC BILAT WITH TOMO: ICD-10-PCS | Mod: 26,,, | Performed by: RADIOLOGY

## 2023-10-04 PROCEDURE — 77062 BREAST TOMOSYNTHESIS BI: CPT | Mod: 26,,, | Performed by: RADIOLOGY

## 2023-10-04 PROCEDURE — 77066 DX MAMMO INCL CAD BI: CPT | Mod: TC

## 2023-10-04 PROCEDURE — 77066 DX MAMMO INCL CAD BI: CPT | Mod: 26,,, | Performed by: RADIOLOGY

## 2023-11-07 ENCOUNTER — OFFICE VISIT (OUTPATIENT)
Dept: OBSTETRICS AND GYNECOLOGY | Facility: CLINIC | Age: 48
End: 2023-11-07
Payer: COMMERCIAL

## 2023-11-07 DIAGNOSIS — N93.9 ABNORMAL UTERINE BLEEDING (AUB): Primary | ICD-10-CM

## 2023-11-07 PROCEDURE — 1159F PR MEDICATION LIST DOCUMENTED IN MEDICAL RECORD: ICD-10-PCS | Mod: CPTII,95,, | Performed by: OBSTETRICS & GYNECOLOGY

## 2023-11-07 PROCEDURE — 1160F PR REVIEW ALL MEDS BY PRESCRIBER/CLIN PHARMACIST DOCUMENTED: ICD-10-PCS | Mod: CPTII,95,, | Performed by: OBSTETRICS & GYNECOLOGY

## 2023-11-07 PROCEDURE — 1159F MED LIST DOCD IN RCRD: CPT | Mod: CPTII,95,, | Performed by: OBSTETRICS & GYNECOLOGY

## 2023-11-07 PROCEDURE — 99212 OFFICE O/P EST SF 10 MIN: CPT | Mod: 95,,, | Performed by: OBSTETRICS & GYNECOLOGY

## 2023-11-07 PROCEDURE — 1160F RVW MEDS BY RX/DR IN RCRD: CPT | Mod: CPTII,95,, | Performed by: OBSTETRICS & GYNECOLOGY

## 2023-11-07 PROCEDURE — 99212 PR OFFICE/OUTPT VISIT, EST, LEVL II, 10-19 MIN: ICD-10-PCS | Mod: 95,,, | Performed by: OBSTETRICS & GYNECOLOGY

## 2023-11-07 NOTE — PROGRESS NOTES
The patient location is: home  The chief complaint leading to consultation is: results    Visit type: audiovisual    Face to Face time with patient: 10 minutes  15 minutes of total time spent on the encounter, which includes face to face time and non-face to face time preparing to see the patient (eg, review of tests), Obtaining and/or reviewing separately obtained history, Documenting clinical information in the electronic or other health record, Independently interpreting results (not separately reported) and communicating results to the patient/family/caregiver, or Care coordination (not separately reported).         Each patient to whom he or she provides medical services by telemedicine is:  (1) informed of the relationship between the physician and patient and the respective role of any other health care provider with respect to management of the patient; and (2) notified that he or she may decline to receive medical services by telemedicine and may withdraw from such care at any time.    Notes:   Chief Complaint: U/S Results     HPI:      Mckenna Peraza is a 48 y.o.  who presents today for follow up of U/S results.  LMP: 10/4/2023.     Since visit 2 months ago, has experienced  regular monthly cycle for 4-5 days.  Notes heavy bleeding on first 2 days and using up to 2 pads/tampons per day.  No other issues, problems, or complaints.     Patient has not had lab work drawn.    OB History          5    Para   5    Term   5            AB        Living   5         SAB        IAB        Ectopic        Multiple        Live Births               Obstetric Comments   14/R/4-5  Hx abnormal pap, s/p LEEP   Hx chlamydia  CD x 5, BTL             Past Medical History:   Diagnosis Date    Abnormal Pap smear of cervix      Past Surgical History:   Procedure Laterality Date    CERVICAL BIOPSY  W/ LOOP ELECTRODE EXCISION       SECTION      five    HERNIA REPAIR      LEFT OOPHORECTOMY        Social History     Socioeconomic History    Marital status:    Tobacco Use    Smoking status: Never     Passive exposure: Never    Smokeless tobacco: Never   Substance and Sexual Activity    Alcohol use: Not Currently     Comment: Only drink about 4 to 5 times a year    Drug use: Never    Sexual activity: Yes     Partners: Male     Birth control/protection: I.U.D., None     Comment: Im still pms every 2 weeks,     Social Determinants of Health     Financial Resource Strain: Medium Risk (11/6/2023)    Overall Financial Resource Strain (CARDIA)     Difficulty of Paying Living Expenses: Somewhat hard   Food Insecurity: Food Insecurity Present (11/6/2023)    Hunger Vital Sign     Worried About Running Out of Food in the Last Year: Sometimes true     Ran Out of Food in the Last Year: Sometimes true   Transportation Needs: No Transportation Needs (11/6/2023)    PRAPARE - Transportation     Lack of Transportation (Medical): No     Lack of Transportation (Non-Medical): No   Physical Activity: Sufficiently Active (11/6/2023)    Exercise Vital Sign     Days of Exercise per Week: 5 days     Minutes of Exercise per Session: 40 min   Stress: No Stress Concern Present (11/6/2023)    [employer] - Occupational Stress Questionnaire     Feeling of Stress : Only a little   Recent Concern: Stress - Stress Concern Present (9/12/2023)    [employer] - Occupational Stress Questionnaire     Feeling of Stress : To some extent   Social Connections: Unknown (11/6/2023)    Social Connection and Isolation Panel [NHANES]     Frequency of Communication with Friends and Family: Three times a week     Frequency of Social Gatherings with Friends and Family: Twice a week     Active Member of Clubs or Organizations: Yes     Attends Club or Organization Meetings: More than 4 times per year     Marital Status:    Housing Stability: Low Risk  (11/6/2023)    Housing Stability  Vital Sign     Unable to Pay for Housing in the Last Year: No     Number of Places Lived in the Last Year: 1     Unstable Housing in the Last Year: No     Family History   Problem Relation Age of Onset    Diabetes Mother     Heart disease Mother     Diabetes Brother     Diabetes Brother     Breast cancer Neg Hx     Colon cancer Neg Hx     Endometrial cancer Neg Hx     Ovarian cancer Neg Hx        Current Outpatient Medications:     hydroxychloroquine (PLAQUENIL) 200 mg tablet, Take 1.5 tablets (300 mg total) by mouth once daily., Disp: 135 tablet, Rfl: 1  No current facility-administered medications for this visit.    ROS:     GENERAL: Denies unintentional weight gain or weight loss. Feeling well overall.   SKIN: Denies rash or lesions.   HEENT: Denies headaches, or vision changes.   ABDOMEN: Denies abdominal pain, constipation, diarrhea, nausea, vomiting, change in appetite.  URINARY: Denies frequency, dysuria, hematuria.  NEUROLOGIC: Denies syncope or weakness.   PSYCHIATRIC: Denies depression, anxiety or mood swings.    Physical Exam:      PHYSICAL EXAM:  There were no vitals taken for this visit.  There is no height or weight on file to calculate BMI.     APPEARANCE: Well nourished, well developed, in no acute distress.  PSYCH: Appropriate mood and affect.  SKIN: No acne or hirsutism.    Results for orders placed during the hospital encounter of 09/14/23    US Pelvis Comp with Transvag NON-OB (xpd    Narrative  EXAMINATION:  US PELVIS COMP WITH TRANSVAG NON-OB (XPD)    CLINICAL HISTORY:  Abnormal uterine and vaginal bleeding, unspecified    TECHNIQUE:  Transabdominal sonography of the pelvis was performed, followed by transvaginal sonography to better evaluate the uterus and ovaries.    COMPARISON:  10/27/2020    FINDINGS:  Uterus:    Size: 8.3 x 4.1 x 4.8 cm  Masses: None  Endometrium: Normal in this pre menopausal patient, measuring 3.4 mm.  There are nabothian cysts.    Right ovary:  Size: 2.4 x 2.2 x 1.5  cm  Appearance: 2.1 x 1.2 x 2.0 cm anechoic cyst with small daughter cyst.  Vascular flow: Normal.    Left ovary:  Surgically removed.    Free Fluid:  None.    Impression  No significant sonographic abnormality.      Electronically signed by: Patrick Mathew MD  Date:    2023  Time:    14:30      Assessment/Plan:     48 y.o.     Abnormal uterine bleeding (AUB)          Counselin.  U/S results reviewed with patient.  No uterine or endometrial masses noted. Small simple ovarian cyst, no further follow up needed.  Normal endometrial stripe.  Resolution of normal menses since annual visit.  Recommend completing lab work given history of subclinical hyperthyroidism in the past. If normal, recommend Mirena IUD vs endometrial ablation. If abnormal thyroid testing, will refer back to PCP.  2.  Follow up with PCP for other health maintenance.  3.  RTC for annual exam or sooner if needed.    Use of the [facility] Patient Portal discussed and encouraged during today's visit.         Tila Estevez MD  [facility]  2023

## 2023-11-25 DIAGNOSIS — M32.19 OTHER SYSTEMIC LUPUS ERYTHEMATOSUS WITH OTHER ORGAN INVOLVEMENT: ICD-10-CM

## 2023-11-27 RX ORDER — HYDROXYCHLOROQUINE SULFATE 200 MG/1
300 TABLET, FILM COATED ORAL DAILY
Qty: 135 TABLET | Refills: 0 | Status: SHIPPED | OUTPATIENT
Start: 2023-11-27 | End: 2024-02-18 | Stop reason: SDUPTHER

## 2023-12-27 NOTE — PROGRESS NOTES
PATIENT: Mckenna Peraza  MRN: [id number]  DATE: 12/28/2023    Diagnosis:   1. Iron deficiency anemia due to chronic blood loss    2. Menorrhagia with regular cycle    3. Other systemic lupus erythematosus with other organ involvement      Chief Complaint: Anemia    Subjective:    History of Present Illness: Ms. Peraza is a 48 y.o. female who presented in October 2020 for evaluation and management of iron deficiency anemia. She was referred by Dr. Zambrano.    Telemedicine visit:  The patient location is: home  The chief complaint leading to consultation is: iron deficiency anemia.    Visit type: audiovisual    Face to Face time with patient: 5 minutes  10 minutes of total time spent on the encounter, which includes face to face time and non-face to face time preparing to see the patient (eg, review of tests), Obtaining and/or reviewing separately obtained history, Documenting clinical information in the electronic or other health record, Independently interpreting results (not separately reported) and communicating results to the patient/family/caregiver, or Care coordination (not separately reported).     Each patient to whom he or she provides medical services by telemedicine is:  (1) informed of the relationship between the physician and patient and the respective role of any other health care provider with respect to management of the patient; and (2) notified that he or she may decline to receive medical services by telemedicine and may withdraw from such care at any time.    Notes: see below      - labs in October 2020 reveal a microcytic anemia with intermittent thrombocytosis.  - iron studies (10/19/20) reveal a decreased ferritin/iron/saturated iron.  - she received four doses of iron sucrose in November 2020    Interval history:  - she presents for a follow-up appointment for her iron deficiency anemia.  - today, she is doing well. She is taking ferrous sulfate and vitamin B12 daily.  - she denies  fatigue, shortness of breath, chest pain, nausea, vomiting, diarrhea, constipation.      Past medical, surgical, family, and social histories have been reviewed and updated below.    Past Medical History:   Past Medical History:   Diagnosis Date    Abnormal Pap smear of cervix        Past Surgical History:   Past Surgical History:   Procedure Laterality Date    CERVICAL BIOPSY  W/ LOOP ELECTRODE EXCISION       SECTION      five    HERNIA REPAIR      LEFT OOPHORECTOMY         Family History:   Family History   Problem Relation Age of Onset    Diabetes Mother     Heart disease Mother     Diabetes Brother     Diabetes Brother     Breast cancer Neg Hx     Colon cancer Neg Hx     Endometrial cancer Neg Hx     Ovarian cancer Neg Hx        Social History:  reports that she has never smoked. She has never been exposed to tobacco smoke. She has never used smokeless tobacco. She reports that she does not currently use alcohol. She reports that she does not use drugs.    Allergies:  Review of patient's allergies indicates:  No Known Allergies    Medications:  Current Outpatient Medications   Medication Sig Dispense Refill    hydroxychloroquine (PLAQUENIL) 200 mg tablet Take 1.5 tablets (300 mg total) by mouth once daily. 135 tablet 0     No current facility-administered medications for this visit.       Review of Systems   Constitutional:  Negative for unexpected weight change.   HENT:  Negative for sore throat.    Eyes:  Negative for visual disturbance.   Respiratory:  Negative for cough and shortness of breath.    Cardiovascular:  Negative for chest pain.   Gastrointestinal:  Negative for abdominal pain, constipation, diarrhea, nausea and vomiting.   Genitourinary:  Negative for dysuria and menstrual problem.   Musculoskeletal:  Negative for back pain.   Skin:  Negative for rash.   Neurological:  Negative for headaches.   Hematological:  Negative for adenopathy.   Psychiatric/Behavioral:  The patient is not  nervous/anxious.        ECOG Performance Status:   ECOG SCORE 0       Objective:      Vitals: There were no vitals filed for this visit.  BMI: There is no height or weight on file to calculate BMI.  Deferred since telemedicine visit    Physical Exam  Deferred since telemedicine visit    Laboratory Data:  Labs have been reviewed.    Lab Results   Component Value Date    WBC 5.38 11/20/2023    HGB 14.2 11/20/2023    HCT 42.0 11/20/2023    MCV 99 (H) 11/20/2023     11/20/2023         Imaging:    Assessment:       1. Iron deficiency anemia due to chronic blood loss    2. Menorrhagia with regular cycle    3. Other systemic lupus erythematosus with other organ involvement           Plan:     1. Iron deficiency anemia due to chronic blood loss / reactive thrombocytosis  - I have reviewed her chart.  - labs in October 2020 reveal a microcytic anemia with intermittent thrombocytosis.  - iron studies (10/19/20) reveal a decreased ferritin/iron/saturated iron.  - given the severity of her iron deficiency anemia, I recommended iron sucrose weekly x 4 doses.  - she received four doses of iron sucrose in November 2020  - her periods have resumed since having her IUD removed.  - Labs have been reviewed. Hemoglobin is 14.2 g/dL. Ferritin has increased from 21 ng/mL to 44 ng/mL.  - continue ferrous sulfate  - return to clinic in 6 months with repeat labs.     2. Menorrhagia with regular cycles.  - the cause of her iron deficiency anemia.  - her periods have resumed since having her IUD removed.  - since her periods have resumed, we decided to monitor labs every 6 months.  - defer management to gynecology.    3. Lupus  - stable. On hydroxychloroquine  - defer management to rheumatology.    - return to clinic in 6 months with repeat labs.    Len Celis M.D.  Hematology/Oncology  [facility] - [address], [address]  [place] [address]  Phone: [phone]  Fax: [phone]

## 2023-12-28 ENCOUNTER — OFFICE VISIT (OUTPATIENT)
Dept: HEMATOLOGY/ONCOLOGY | Facility: CLINIC | Age: 48
End: 2023-12-28
Payer: COMMERCIAL

## 2023-12-28 DIAGNOSIS — N92.0 MENORRHAGIA WITH REGULAR CYCLE: ICD-10-CM

## 2023-12-28 DIAGNOSIS — D50.0 IRON DEFICIENCY ANEMIA DUE TO CHRONIC BLOOD LOSS: Primary | ICD-10-CM

## 2023-12-28 DIAGNOSIS — M32.19 OTHER SYSTEMIC LUPUS ERYTHEMATOSUS WITH OTHER ORGAN INVOLVEMENT: ICD-10-CM

## 2023-12-28 PROCEDURE — 1159F PR MEDICATION LIST DOCUMENTED IN MEDICAL RECORD: ICD-10-PCS | Mod: CPTII,95,, | Performed by: INTERNAL MEDICINE

## 2023-12-28 PROCEDURE — 1160F PR REVIEW ALL MEDS BY PRESCRIBER/CLIN PHARMACIST DOCUMENTED: ICD-10-PCS | Mod: CPTII,95,, | Performed by: INTERNAL MEDICINE

## 2023-12-28 PROCEDURE — 99213 PR OFFICE/OUTPT VISIT, EST, LEVL III, 20-29 MIN: ICD-10-PCS | Mod: 95,,, | Performed by: INTERNAL MEDICINE

## 2023-12-28 PROCEDURE — 99213 OFFICE O/P EST LOW 20 MIN: CPT | Mod: 95,,, | Performed by: INTERNAL MEDICINE

## 2023-12-28 PROCEDURE — 1159F MED LIST DOCD IN RCRD: CPT | Mod: CPTII,95,, | Performed by: INTERNAL MEDICINE

## 2023-12-28 PROCEDURE — 1160F RVW MEDS BY RX/DR IN RCRD: CPT | Mod: CPTII,95,, | Performed by: INTERNAL MEDICINE

## 2024-02-18 DIAGNOSIS — M32.19 OTHER SYSTEMIC LUPUS ERYTHEMATOSUS WITH OTHER ORGAN INVOLVEMENT: ICD-10-CM

## 2024-02-19 RX ORDER — HYDROXYCHLOROQUINE SULFATE 200 MG/1
300 TABLET, FILM COATED ORAL DAILY
Qty: 135 TABLET | Refills: 0 | Status: SHIPPED | OUTPATIENT
Start: 2024-02-19 | End: 2024-04-02 | Stop reason: SDUPTHER

## 2024-04-02 DIAGNOSIS — M32.19 OTHER SYSTEMIC LUPUS ERYTHEMATOSUS WITH OTHER ORGAN INVOLVEMENT: ICD-10-CM

## 2024-04-02 RX ORDER — HYDROXYCHLOROQUINE SULFATE 200 MG/1
300 TABLET, FILM COATED ORAL DAILY
Qty: 135 TABLET | Refills: 0 | Status: SHIPPED | OUTPATIENT
Start: 2024-04-02 | End: 2024-06-10

## 2024-06-10 ENCOUNTER — OFFICE VISIT (OUTPATIENT)
Dept: RHEUMATOLOGY | Facility: CLINIC | Age: 49
End: 2024-06-10
Payer: COMMERCIAL

## 2024-06-10 ENCOUNTER — LAB VISIT (OUTPATIENT)
Dept: LAB | Facility: HOSPITAL | Age: 49
End: 2024-06-10
Attending: INTERNAL MEDICINE
Payer: COMMERCIAL

## 2024-06-10 VITALS
SYSTOLIC BLOOD PRESSURE: 117 MMHG | RESPIRATION RATE: 18 BRPM | HEIGHT: 61 IN | BODY MASS INDEX: 29.27 KG/M2 | DIASTOLIC BLOOD PRESSURE: 79 MMHG | HEART RATE: 79 BPM | WEIGHT: 155 LBS

## 2024-06-10 DIAGNOSIS — Z79.899 HIGH RISK MEDICATION USE: Chronic | ICD-10-CM

## 2024-06-10 DIAGNOSIS — L30.8 OTHER ECZEMA: ICD-10-CM

## 2024-06-10 DIAGNOSIS — M32.19 OTHER SYSTEMIC LUPUS ERYTHEMATOSUS WITH OTHER ORGAN INVOLVEMENT: Primary | ICD-10-CM

## 2024-06-10 DIAGNOSIS — M32.19 OTHER SYSTEMIC LUPUS ERYTHEMATOSUS WITH OTHER ORGAN INVOLVEMENT: ICD-10-CM

## 2024-06-10 LAB
BASOPHILS # BLD AUTO: 0.01 K/UL (ref 0–0.2)
BASOPHILS NFR BLD: 0.2 % (ref 0–1.9)
DIFFERENTIAL METHOD BLD: ABNORMAL
EOSINOPHIL # BLD AUTO: 0 K/UL (ref 0–0.5)
EOSINOPHIL NFR BLD: 0.7 % (ref 0–8)
ERYTHROCYTE [DISTWIDTH] IN BLOOD BY AUTOMATED COUNT: 12.9 % (ref 11.5–14.5)
ERYTHROCYTE [SEDIMENTATION RATE] IN BLOOD BY PHOTOMETRIC METHOD: 8 MM/HR (ref 0–36)
HCT VFR BLD AUTO: 39.4 % (ref 37–48.5)
HGB BLD-MCNC: 13.5 G/DL (ref 12–16)
IMM GRANULOCYTES # BLD AUTO: 0.03 K/UL (ref 0–0.04)
IMM GRANULOCYTES NFR BLD AUTO: 0.6 % (ref 0–0.5)
LYMPHOCYTES # BLD AUTO: 2.1 K/UL (ref 1–4.8)
LYMPHOCYTES NFR BLD: 39.3 % (ref 18–48)
MCH RBC QN AUTO: 33.7 PG (ref 27–31)
MCHC RBC AUTO-ENTMCNC: 34.3 G/DL (ref 32–36)
MCV RBC AUTO: 98 FL (ref 82–98)
MONOCYTES # BLD AUTO: 0.7 K/UL (ref 0.3–1)
MONOCYTES NFR BLD: 13.4 % (ref 4–15)
NEUTROPHILS # BLD AUTO: 2.5 K/UL (ref 1.8–7.7)
NEUTROPHILS NFR BLD: 45.8 % (ref 38–73)
NRBC BLD-RTO: 0 /100 WBC
PLATELET # BLD AUTO: 202 K/UL (ref 150–450)
PMV BLD AUTO: 11.5 FL (ref 9.2–12.9)
RBC # BLD AUTO: 4.01 M/UL (ref 4–5.4)
RETICS/RBC NFR AUTO: 1.8 % (ref 0.5–2.5)
WBC # BLD AUTO: 5.37 K/UL (ref 3.9–12.7)

## 2024-06-10 PROCEDURE — 1160F RVW MEDS BY RX/DR IN RCRD: CPT | Mod: CPTII,S$GLB,, | Performed by: INTERNAL MEDICINE

## 2024-06-10 PROCEDURE — 85045 AUTOMATED RETICULOCYTE COUNT: CPT | Performed by: INTERNAL MEDICINE

## 2024-06-10 PROCEDURE — 3074F SYST BP LT 130 MM HG: CPT | Mod: CPTII,S$GLB,, | Performed by: INTERNAL MEDICINE

## 2024-06-10 PROCEDURE — 85652 RBC SED RATE AUTOMATED: CPT | Performed by: INTERNAL MEDICINE

## 2024-06-10 PROCEDURE — 36415 COLL VENOUS BLD VENIPUNCTURE: CPT | Performed by: INTERNAL MEDICINE

## 2024-06-10 PROCEDURE — 85025 COMPLETE CBC W/AUTO DIFF WBC: CPT | Performed by: INTERNAL MEDICINE

## 2024-06-10 PROCEDURE — 99214 OFFICE O/P EST MOD 30 MIN: CPT | Mod: S$GLB,,, | Performed by: INTERNAL MEDICINE

## 2024-06-10 PROCEDURE — 3008F BODY MASS INDEX DOCD: CPT | Mod: CPTII,S$GLB,, | Performed by: INTERNAL MEDICINE

## 2024-06-10 PROCEDURE — 99999 PR PBB SHADOW E&M-EST. PATIENT-LVL III: CPT | Mod: PBBFAC,,, | Performed by: INTERNAL MEDICINE

## 2024-06-10 PROCEDURE — 1159F MED LIST DOCD IN RCRD: CPT | Mod: CPTII,S$GLB,, | Performed by: INTERNAL MEDICINE

## 2024-06-10 PROCEDURE — 3078F DIAST BP <80 MM HG: CPT | Mod: CPTII,S$GLB,, | Performed by: INTERNAL MEDICINE

## 2024-06-10 RX ORDER — HYDROXYCHLOROQUINE SULFATE 200 MG/1
300 TABLET, FILM COATED ORAL DAILY
Qty: 135 TABLET | Refills: 3 | Status: SHIPPED | OUTPATIENT
Start: 2024-06-10

## 2024-06-10 ASSESSMENT — ROUTINE ASSESSMENT OF PATIENT INDEX DATA (RAPID3)
PATIENT GLOBAL ASSESSMENT SCORE: 0.5
TOTAL RAPID3 SCORE: 0.44
FATIGUE SCORE: 4.5
MDHAQ FUNCTION SCORE: 0.1
PSYCHOLOGICAL DISTRESS SCORE: 2.2
PAIN SCORE: 0.5

## 2024-06-10 NOTE — ASSESSMENT & PLAN NOTE
Has had a good year on hydroxychloroquine except for 1 flare up of rash in March / April that has now cleared. She feels well and reports no other extradermal symptoms except for possible mild anemia    Will recheck SLE activity labs today and then if ok will see her in one year.

## 2024-06-10 NOTE — PROGRESS NOTES
"Subjective:       Patient ID: Mckenna Peraza is a 48 y.o. female.    Chief Complaint: Disease Management    HPI    , lives in [place]  Has 2 small children     2020 had a lot of pain and had +REAGAN/SSA, low +IgM APA x1; neg Myomarker panel x SSA  Started hydroxychloroquine and symptoms resolved   [id number]; all C symptoms failure to progress; unilateral ooph and tubal ligation     mg/d     History eczema x 8 years ;   Has had skin lesions that responded to topicals; elbows, back, legs and face; Bx read as most likely SCLE  Saw me May 2023; REAGAN 1:540, homo & speckled, SSA 4.9; neg DNA and Sm, nl C3/C4; neg urine protein/creatinine ratio ; neg anti-phospholipid antibody panel    Had rash in March and April for about 5 week , face/ ears, and L neck/shoulder  Ear looked toasted    She likes to cook and to know where her food comes from  Exercises 30 min 4d/week, plus trampoline   Cleans houses    Review of Systems   Constitutional:  Negative for fever and unexpected weight change.   HENT:  Negative for mouth sores and trouble swallowing.    Eyes:  Negative for redness.   Respiratory:  Positive for cough and shortness of breath.    Cardiovascular:  Negative for chest pain.   Gastrointestinal:  Negative for constipation and diarrhea.   Genitourinary:  Negative for dysuria and genital sores.   Skin:  Negative for rash.   Neurological:  Positive for headaches.   Hematological:  Does not bruise/bleed easily.           2024     4:42 PM   Rapid3 Question Responses and Scores   MDHAQ Score 0.1   Psychologic Score 2.2   Pain Score 0.5   When you awakened in the morning OVER THE LAST WEEK, did you feel stiff? No   Fatigue Score 4.5   Global Health Score 0.5   RAPID3 Score 0.44      Objective:   /79   Pulse 79   Resp 18   Ht 5' 1" (1.549 m)   Wt 70.3 kg (154 lb 15.7 oz)   BMI 29.28 kg/m²      Physical Exam   Eyes: Conjunctivae are normal.   Cardiovascular: Normal rate and regular " rhythm.   Pulmonary/Chest: She has no wheezes. She has no rales.   Lymphadenopathy:     She has no cervical adenopathy.   Skin: No rash noted.         Assessment:       1. Other systemic lupus erythematosus with other organ involvement    2. Other eczema            Plan:       Problem List Items Addressed This Visit          Active Problems    Systemic lupus erythematosus - Primary     Has had a good year on hydroxychloroquine except for 1 flare up of rash in March / April that has now cleared. She feels well and reports no other extradermal symptoms except for possible mild anemia    Will recheck SLE activity labs today and then if ok will see her in one year.             Relevant Medications    hydroxychloroquine (PLAQUENIL) 200 mg tablet    Other Relevant Orders    Reticulocytes    Direct antiglobulin test    Eczema

## 2024-06-22 NOTE — PROGRESS NOTES
PATIENT: Mckenna Peraza  MRN: [id number]  DATE: 6/28/2024    Diagnosis:   1. Iron deficiency anemia due to chronic blood loss    2. Menorrhagia with regular cycle    3. Other systemic lupus erythematosus with other organ involvement      Chief Complaint: Anemia    Subjective:    History of Present Illness: Ms. Peraza is a 48 y.o. female who presented in October 2020 for evaluation and management of iron deficiency anemia. She was referred by Dr. Zambrano.    Telemedicine visit:  The patient location is: home  The chief complaint leading to consultation is: iron deficiency anemia.    Visit type: audiovisual    Face to Face time with patient: 5 minutes  10 minutes of total time spent on the encounter, which includes face to face time and non-face to face time preparing to see the patient (eg, review of tests), Obtaining and/or reviewing separately obtained history, Documenting clinical information in the electronic or other health record, Independently interpreting results (not separately reported) and communicating results to the patient/family/caregiver, or Care coordination (not separately reported).     Each patient to whom he or she provides medical services by telemedicine is:  (1) informed of the relationship between the physician and patient and the respective role of any other health care provider with respect to management of the patient; and (2) notified that he or she may decline to receive medical services by telemedicine and may withdraw from such care at any time.    Notes: see below      - labs in October 2020 reveal a microcytic anemia with intermittent thrombocytosis.  - iron studies (10/19/20) reveal a decreased ferritin/iron/saturated iron.  - she received four doses of iron sucrose in November 2020    Interval history:  - she presents for a follow-up appointment for her iron deficiency anemia.  - today, she is doing well. She denies fatigue, shortness of breath, chest pain, nausea, vomiting,  diarrhea, constipation.      Past medical, surgical, family, and social histories have been reviewed and updated below.    Past Medical History:   Past Medical History:   Diagnosis Date    Abnormal Pap smear of cervix        Past Surgical History:   Past Surgical History:   Procedure Laterality Date    CERVICAL BIOPSY  W/ LOOP ELECTRODE EXCISION       SECTION      [age]    HERNIA REPAIR      LEFT OOPHORECTOMY         Family History:   Family History   Problem Relation Name Age of Onset    Diabetes Mother Khloe Tucker     Heart disease Mother Khloe Tucker     Diabetes Brother      Diabetes Brother Ruperto Varela     Breast cancer Neg Hx      Colon cancer Neg Hx      Endometrial cancer Neg Hx      Ovarian cancer Neg Hx         Social History:  reports that she has never smoked. She has never been exposed to tobacco smoke. She has never used smokeless tobacco. She reports that she does not currently use alcohol. She reports that she does not use drugs.    Allergies:  Review of patient's allergies indicates:  No Known Allergies    Medications:  Current Outpatient Medications   Medication Sig Dispense Refill    hydroxychloroquine (PLAQUENIL) 200 mg tablet Take 1.5 tablets (300 mg total) by mouth once daily. 135 tablet 3     No current facility-administered medications for this visit.       Review of Systems   Constitutional:  Negative for unexpected weight change.   HENT:  Negative for sore throat.    Eyes:  Negative for visual disturbance.   Respiratory:  Negative for cough and shortness of breath.    Cardiovascular:  Negative for chest pain.   Gastrointestinal:  Negative for abdominal pain, constipation, diarrhea, nausea and vomiting.   Genitourinary:  Negative for dysuria and menstrual problem.   Musculoskeletal:  Negative for back pain.   Skin:  Negative for rash.   Neurological:  Negative for headaches.   Hematological:  Negative for adenopathy.   Psychiatric/Behavioral:  The patient is not  nervous/anxious.        ECOG Performance Status:   ECOG SCORE 0       Objective:      Vitals: There were no vitals filed for this visit.  BMI: There is no height or weight on file to calculate BMI.  Deferred since telemedicine visit    Physical Exam  Deferred since telemedicine visit    Laboratory Data:  Labs have been reviewed.    Lab Results   Component Value Date    WBC 5.37 06/10/2024    HGB 13.5 06/10/2024    HCT 39.4 06/10/2024    MCV 98 06/10/2024     06/10/2024         Imaging:    Assessment:       1. Iron deficiency anemia due to chronic blood loss    2. Menorrhagia with regular cycle    3. Other systemic lupus erythematosus with other organ involvement           Plan:     1. Iron deficiency anemia due to chronic blood loss / reactive thrombocytosis  - I have reviewed her chart.  - labs in October 2020 reveal a microcytic anemia with intermittent thrombocytosis.  - iron studies (10/19/20) reveal a decreased ferritin/iron/saturated iron.  - given the severity of her iron deficiency anemia, I recommended iron sucrose weekly x 4 doses.  - she received four doses of iron sucrose in November 2020  - her periods have resumed since having her IUD removed.  - Labs have been reviewed. Hemoglobin is 13.5 g/dL. Ferritin is 38 ng/mL  - continue ferrous sulfate  - return to clinic in 6 months with repeat labs.     2. Menorrhagia with regular cycles.  - the cause of her iron deficiency anemia.  - her periods have resumed since having her IUD removed.  - since her periods have resumed, we decided to monitor labs every 6 months.  - defer management to gynecology.    3. Lupus  - stable. On hydroxychloroquine  - defer management to rheumatology.    - return to clinic in 6 months with repeat labs.    eLn Celis M.D.  Hematology/Oncology  [facility] - [address], [address]  [place] [address]  Phone: [phone]  Fax: [phone]

## 2024-06-28 ENCOUNTER — OFFICE VISIT (OUTPATIENT)
Dept: HEMATOLOGY/ONCOLOGY | Facility: CLINIC | Age: 49
End: 2024-06-28
Payer: COMMERCIAL

## 2024-06-28 DIAGNOSIS — N92.0 MENORRHAGIA WITH REGULAR CYCLE: ICD-10-CM

## 2024-06-28 DIAGNOSIS — D50.0 IRON DEFICIENCY ANEMIA DUE TO CHRONIC BLOOD LOSS: Primary | ICD-10-CM

## 2024-06-28 DIAGNOSIS — M32.19 OTHER SYSTEMIC LUPUS ERYTHEMATOSUS WITH OTHER ORGAN INVOLVEMENT: ICD-10-CM

## 2024-07-15 ENCOUNTER — PATIENT MESSAGE (OUTPATIENT)
Dept: ADMINISTRATIVE | Facility: HOSPITAL | Age: 49
End: 2024-07-15
Payer: COMMERCIAL

## 2024-07-18 ENCOUNTER — PATIENT OUTREACH (OUTPATIENT)
Dept: ADMINISTRATIVE | Facility: HOSPITAL | Age: 49
End: 2024-07-18
Payer: COMMERCIAL

## 2024-07-18 DIAGNOSIS — Z12.31 OTHER SCREENING MAMMOGRAM: Primary | ICD-10-CM

## 2024-07-18 NOTE — PROGRESS NOTES
Health Maintenance Due   Topic Date Due    TETANUS VACCINE  Never done    Hemoglobin A1c (Diabetic Prevention Screening)  Never done    COVID-19 Vaccine (3 - 2023-24 season) 09/01/2023    Mammogram  10/04/2024     Chart review completed. HM Updated. Triggered Links. Immunizations reviewed and updated. Care Everywhere Updated. Care Team Updated.  Patient is due for mammogram. Placed order. Patient outreached via portal in regards to scheduling.

## 2024-07-27 ENCOUNTER — PATIENT MESSAGE (OUTPATIENT)
Dept: PRIMARY CARE CLINIC | Facility: CLINIC | Age: 49
End: 2024-07-27
Payer: COMMERCIAL

## 2024-07-27 DIAGNOSIS — E05.90 SUBCLINICAL HYPERTHYROIDISM: ICD-10-CM

## 2024-07-27 DIAGNOSIS — Z00.00 WELLNESS EXAMINATION: Primary | ICD-10-CM

## 2024-07-30 ENCOUNTER — TELEPHONE (OUTPATIENT)
Dept: PRIMARY CARE CLINIC | Facility: CLINIC | Age: 49
End: 2024-07-30
Payer: COMMERCIAL

## 2024-10-04 ENCOUNTER — OFFICE VISIT (OUTPATIENT)
Dept: PRIMARY CARE CLINIC | Facility: CLINIC | Age: 49
End: 2024-10-04
Payer: COMMERCIAL

## 2024-10-04 ENCOUNTER — PATIENT MESSAGE (OUTPATIENT)
Dept: PRIMARY CARE CLINIC | Facility: CLINIC | Age: 49
End: 2024-10-04

## 2024-10-04 VITALS
DIASTOLIC BLOOD PRESSURE: 76 MMHG | WEIGHT: 158.06 LBS | HEART RATE: 82 BPM | SYSTOLIC BLOOD PRESSURE: 120 MMHG | OXYGEN SATURATION: 98 % | BODY MASS INDEX: 29.84 KG/M2 | HEIGHT: 61 IN

## 2024-10-04 DIAGNOSIS — Z00.00 WELLNESS EXAMINATION: Primary | ICD-10-CM

## 2024-10-04 DIAGNOSIS — M32.19 OTHER SYSTEMIC LUPUS ERYTHEMATOSUS WITH OTHER ORGAN INVOLVEMENT: ICD-10-CM

## 2024-10-04 DIAGNOSIS — D50.0 IRON DEFICIENCY ANEMIA DUE TO CHRONIC BLOOD LOSS: ICD-10-CM

## 2024-10-04 DIAGNOSIS — N92.1 MENORRHAGIA WITH IRREGULAR CYCLE: ICD-10-CM

## 2024-10-04 PROCEDURE — 99999 PR PBB SHADOW E&M-EST. PATIENT-LVL III: CPT | Mod: PBBFAC,,, | Performed by: INTERNAL MEDICINE

## 2024-10-04 NOTE — PROGRESS NOTES
[facility] Note    Chief Complaint      Chief Complaint   Patient presents with    Annual Exam     History of Present Illness      Mckenna Peraza is a 48 y.o. female who presents today for chief complaint annual wellness .     HPI   Last seen 5.23 annual   Has CBC but otherwise due for labs   SLE sees rheum Dr Cameron remains on HCQ off of prednisone +REAGAN/SSA, low +IgM APA x1, tried to reduce but had flare of cutaneous lupus proven on biopsy. She prev thought this was eczema. She had been having flares for several months, unresponsive to steroids, has some spots on her neck today- photosensitive worsened on vacatioin to [place], she feels worse when she is ovulating, She felt poorly on IUD - emotional fluctuations, periods irregular since removal, feels she is perimenopausal, some vasomotor symptoms   Today she says she if feeling really well  She is being seen annually in rheum   She is eating clean  She is wearing only natural fibers  She has joint pain but its manageable   Intermittent r wrist pain     PROSPER sees Dr Vimal CARRION she is off of PO iron at present and has follow up schedule to check iron     HM  Mmg 24 ordered   Pap: 9.23Amanda Herb norton 4.22     She is exercising 4x a week     Active Problem List with Overview Notes    Diagnosis Date Noted    High risk medication use 06/10/2024     Hydroxychloroquine for cutaneous lupus      Chronic right shoulder pain 04/12/2022    Pain of left sacroiliac joint 04/12/2022    Menorrhagia with irregular cycle 12/30/2020    Ventral hernia without obstruction or gangrene 11/17/2020    Systemic lupus erythematosus 11/12/2020     Pt with initial presentation of diffuse arthralgias  - Found to have REAGAN 1:640 and +SSA antibody as well as positive anitphospholipid antibody  - SLE (2019 EULAR/ACR): 8 [Positive REAGAN, Synovitis and APS antibody]  - 2020 started hydroxychloroquine for presentation is consistent with early SLE  - 2023 developed SCLE when on  "lower dose of hydroxychloroquine       Iron deficiency anemia due to chronic blood loss 10/27/2020    Eczema 10/27/2020     Due to Ro/SSA positive  mild SLE         Health Maintenance   Topic Date Due    TETANUS VACCINE  Never done    Mammogram  10/04/2024    Colorectal Cancer Screening  2025    Lipid Panel  2028    Hepatitis C Screening  Completed       Past Medical History:   Diagnosis Date    Abnormal Pap smear of cervix        Past Surgical History:   Procedure Laterality Date    CERVICAL BIOPSY  W/ LOOP ELECTRODE EXCISION       SECTION      five    HERNIA REPAIR      LEFT OOPHORECTOMY         family history includes Diabetes in her brother, brother, and mother; Heart disease in her mother.    Social History     Tobacco Use    Smoking status: Never     Passive exposure: Never    Smokeless tobacco: Never   Substance Use Topics    Alcohol use: Not Currently     Comment: Only drink about 4 to 5 times a year    Drug use: Never       Review of Systems   Constitutional:  Negative for chills, fever, malaise/fatigue and weight loss.   Respiratory:  Negative for cough, sputum production, shortness of breath and wheezing.    Cardiovascular:  Negative for chest pain, palpitations, orthopnea and leg swelling.   Gastrointestinal:  Negative for constipation, diarrhea, nausea and vomiting.   Genitourinary:  Negative for dysuria, frequency, hematuria and urgency.        Outpatient Encounter Medications as of 10/4/2024   Medication Sig Dispense Refill    hydroxychloroquine (PLAQUENIL) 200 mg tablet Take 1.5 tablets (300 mg total) by mouth once daily. 135 tablet 3     No facility-administered encounter medications on file as of 10/4/2024.        Review of patient's allergies indicates:  No Known Allergies        Physical Exam      Vital Signs  Pulse: 82  SpO2: 98 %  BP: 120/76  BP Location: Right arm  Patient Position: Sitting  Height and Weight  Height: 5' 1" (154.9 cm)  Weight: 71.7 kg (158 lb 1.1 oz)  BSA " (Calculated - sq m): 1.76 sq meters  BMI (Calculated): 29.9  Weight in (lb) to have BMI = 25: 132]    Physical Exam  Vitals reviewed.   Constitutional:       General: She is not in acute distress.     Appearance: She is well-developed. She is not diaphoretic.   HENT:      Head: Normocephalic and atraumatic.      Right Ear: External ear normal.      Left Ear: External ear normal.      Nose: Nose normal.   Eyes:      General:         Right eye: No discharge.         Left eye: No discharge.      Conjunctiva/sclera: Conjunctivae normal.   Cardiovascular:      Rate and Rhythm: Normal rate and regular rhythm.      Heart sounds: Normal heart sounds.   Pulmonary:      Effort: Pulmonary effort is normal. No respiratory distress.      Breath sounds: Normal breath sounds.   Musculoskeletal:         General: Normal range of motion.      Cervical back: Normal range of motion.   Skin:     Coloration: Skin is not pale.      Findings: No rash.   Neurological:      Mental Status: She is alert and oriented to person, place, and time.   Psychiatric:         Behavior: Behavior normal.         Thought Content: Thought content normal.          Laboratory & Radiology:  No recent for review         Assessment/Plan     Mckenna Peraza is a 48 y.o.female with:    1. Wellness examination    2. Other systemic lupus erythematosus with other organ involvement  Overview:  Pt with initial presentation of diffuse arthralgias  - Found to have REAGAN 1:640 and +SSA antibody as well as positive anitphospholipid antibody  - SLE (2019 EULAR/ACR): 8 [Positive REAGAN, Synovitis and APS antibody]  - 2020 started hydroxychloroquine for presentation is consistent with early SLE  - 2023 developed SCLE when on lower dose of hydroxychloroquine     Orders:  -     Comprehensive Metabolic Panel; Future; Expected date: 10/04/2024    3. Iron deficiency anemia due to chronic blood loss  Assessment & Plan:  Follow along with HO who is primary managing       4. Menorrhagia  with irregular cycle  Assessment & Plan:  Perimenopause            Use of the [facility] Patient Portal discussed and encouraged during today's visit  -Continue current medications and maintain follow up with specialists.  Return to clinic in 1 year  Future Appointments   Date Time Provider Department Center   12/23/2024  9:30 AM LAB, [facility]   12/26/2024  3:00 PM Len Celis MD [facility] HEM ONC Anisa Zambrano MD  10/4/2024 8:48 AM    [facility]

## 2024-11-19 ENCOUNTER — PATIENT MESSAGE (OUTPATIENT)
Dept: GASTROENTEROLOGY | Facility: CLINIC | Age: 49
End: 2024-11-19
Payer: COMMERCIAL

## 2024-12-21 NOTE — PROGRESS NOTES
PATIENT: Mckenna Peraza  MRN: [id number]  DATE: 12/26/2024    Diagnosis:   1. History of iron deficiency anemia    2. Menorrhagia with regular cycle    3. Other systemic lupus erythematosus with other organ involvement      Chief Complaint: iron deficiency    Subjective:    History of Present Illness: Ms. Peraza is a 49 y.o. female who presented in October 2020 for evaluation and management of iron deficiency anemia. She was referred by Dr. Zambrano.    Telemedicine visit:  The patient location is: home  The chief complaint leading to consultation is: iron deficiency anemia.    Visit type: audiovisual    Face to Face time with patient: 5 minutes  10 minutes of total time spent on the encounter, which includes face to face time and non-face to face time preparing to see the patient (eg, review of tests), Obtaining and/or reviewing separately obtained history, Documenting clinical information in the electronic or other health record, Independently interpreting results (not separately reported) and communicating results to the patient/family/caregiver, or Care coordination (not separately reported).     Each patient to whom he or she provides medical services by telemedicine is:  (1) informed of the relationship between the physician and patient and the respective role of any other health care provider with respect to management of the patient; and (2) notified that he or she may decline to receive medical services by telemedicine and may withdraw from such care at any time.    Notes: see below      - labs in October 2020 reveal a microcytic anemia with intermittent thrombocytosis.  - iron studies (10/19/20) reveal a decreased ferritin/iron/saturated iron.  - she received four doses of iron sucrose in November 2020    Interval history:  - she presents for a follow-up appointment for her iron deficiency anemia.  - today, she is doing well. She denies fatigue, shortness of breath, chest pain, nausea, vomiting, diarrhea,  constipation.  - she states she has not taken oral iron in about a year.    Past medical, surgical, family, and social histories have been reviewed and updated below.    Past Medical History:   Past Medical History:   Diagnosis Date    Abnormal Pap smear of cervix        Past Surgical History:   Past Surgical History:   Procedure Laterality Date    CERVICAL BIOPSY  W/ LOOP ELECTRODE EXCISION       SECTION      five    HERNIA REPAIR      LEFT OOPHORECTOMY         Family History:   Family History   Problem Relation Name Age of Onset    Diabetes Mother Khloe Tucker     Heart disease Mother Khloe Tucker     Diabetes Brother      Diabetes Brother Ruperto Varela     Breast cancer Neg Hx      Colon cancer Neg Hx      Endometrial cancer Neg Hx      Ovarian cancer Neg Hx         Social History:  reports that she has never smoked. She has never been exposed to tobacco smoke. She has never used smokeless tobacco. She reports that she does not currently use alcohol. She reports that she does not use drugs.    Allergies:  Review of patient's allergies indicates:  No Known Allergies    Medications:  Current Outpatient Medications   Medication Sig Dispense Refill    hydroxychloroquine (PLAQUENIL) 200 mg tablet Take 1.5 tablets (300 mg total) by mouth once daily. 135 tablet 3     No current facility-administered medications for this visit.       Review of Systems   Constitutional:  Negative for unexpected weight change.   HENT:  Negative for sore throat.    Eyes:  Negative for visual disturbance.   Respiratory:  Negative for cough and shortness of breath.    Cardiovascular:  Negative for chest pain.   Gastrointestinal:  Negative for abdominal pain, constipation, diarrhea, nausea and vomiting.   Genitourinary:  Negative for dysuria and menstrual problem.   Musculoskeletal:  Negative for back pain.   Skin:  Negative for rash.   Neurological:  Negative for headaches.   Hematological:  Negative for adenopathy.    Psychiatric/Behavioral:  The patient is not nervous/anxious.        ECOG Performance Status:   ECOG SCORE 0       Objective:      Vitals: There were no vitals filed for this visit.  BMI: There is no height or weight on file to calculate BMI.  Deferred since telemedicine visit    Physical Exam  Deferred since telemedicine visit    Laboratory Data:  Labs have been reviewed.    Lab Results   Component Value Date    WBC 4.95 12/23/2024    HGB 13.6 12/23/2024    HCT 39.9 12/23/2024    MCV 96 12/23/2024     12/23/2024         Imaging:    Assessment:       1. Iron deficiency anemia due to chronic blood loss    2. Menorrhagia with regular cycle    3. Other systemic lupus erythematosus with other organ involvement           Plan:     1. Iron deficiency anemia due to chronic blood loss / reactive thrombocytosis  - I have reviewed her chart.  - labs in October 2020 reveal a microcytic anemia with intermittent thrombocytosis.  - iron studies (10/19/20) reveal a decreased ferritin/iron/saturated iron.  - given the severity of her iron deficiency anemia, I recommended iron sucrose weekly x 4 doses.  - she received four doses of iron sucrose in November 2020  - her periods have resumed since having her IUD removed.  - Labs have been reviewed. Hemoglobin is 13.6 g/dL. Ferritin is 36 ng/mL  - she states she has not taken oral iron in about a year.  - return to clinic in 6 months with repeat labs.     2. Menorrhagia with regular cycles.  - the cause of her iron deficiency anemia.  - her periods have resumed since having her IUD removed.  - her menorrhagia has improved.  - defer management to gynecology.    3. Lupus  - stable. On hydroxychloroquine  - defer management to rheumatology.    - return to clinic in 6 months with repeat labs.    Len Celis M.D.  Hematology/Oncology  [facility] - [address], [address]  [place] [address]  Phone: [phone]  Fax: [phone]

## 2024-12-26 ENCOUNTER — OFFICE VISIT (OUTPATIENT)
Dept: HEMATOLOGY/ONCOLOGY | Facility: CLINIC | Age: 49
End: 2024-12-26
Payer: COMMERCIAL

## 2024-12-26 DIAGNOSIS — M32.19 OTHER SYSTEMIC LUPUS ERYTHEMATOSUS WITH OTHER ORGAN INVOLVEMENT: ICD-10-CM

## 2024-12-26 DIAGNOSIS — Z86.2 HISTORY OF IRON DEFICIENCY ANEMIA: Primary | ICD-10-CM

## 2024-12-26 DIAGNOSIS — N92.0 MENORRHAGIA WITH REGULAR CYCLE: ICD-10-CM

## 2025-01-27 NOTE — PROGRESS NOTES
Mckenna Peraza  1975        Subjective     Chief Complaint: Follow-up (Lab work)      History of Present Illness:  Ms. Mckenna Peraza is a 49 y.o. female who presents to clinic for establishing care.  Previously seen by Dr. Faby Zambrano who moved out of town.      Mammo: utd, next due 2025  Pap: utd, next due 2028  C-scope: utd, next due 2025    Feeling well today.  Intermittent heavy periods however lightening.  Intermittent chronic left sided back pain that is relieved with heating pads and PRN NSAID, rarely needs.  Denies chest pain, dyspnea, changes in bowel movements.  Recently started low sugar and carb diet with intermittent fasting.  Exercises regularly.        Review of Systems   Constitutional:  Negative for chills and fever.   HENT:  Negative for sore throat.    Respiratory:  Negative for shortness of breath.    Cardiovascular:  Negative for chest pain.   Gastrointestinal:  Negative for abdominal pain, blood in stool, constipation, diarrhea, nausea and vomiting.   Genitourinary:  Negative for dysuria and hematuria.        PAST HISTORY:     Past Medical History:   Diagnosis Date    Abnormal Pap smear of cervix        Past Surgical History:   Procedure Laterality Date    CERVICAL BIOPSY  W/ LOOP ELECTRODE EXCISION       SECTION      five    HERNIA REPAIR      LEFT OOPHORECTOMY         Family History   Problem Relation Name Age of Onset    Diabetes Mother Khloe Tucker     Heart disease Mother Khloe Tucker     Diabetes Brother      Diabetes Brother Ruperto Varela     Breast cancer Neg Hx      Colon cancer Neg Hx      Endometrial cancer Neg Hx      Ovarian cancer Neg Hx         Social History     Socioeconomic History    Marital status:    Tobacco Use    Smoking status: Never     Passive exposure: Never    Smokeless tobacco: Never   Substance and Sexual Activity    Alcohol use: Not Currently     Comment: Only drink about 4 to 5 times a year    Drug use: Never    Sexual  activity: Yes     Partners: Male     Birth control/protection: I.U.D., None     Comment: Im still pms every 2 weeks,     Social Drivers of Health     Financial Resource Strain: Medium Risk (1/18/2025)    Overall Financial Resource Strain (CARDIA)     Difficulty of Paying Living Expenses: Somewhat hard   Food Insecurity: Food Insecurity Present (1/18/2025)    Hunger Vital Sign     Worried About Running Out of Food in the Last Year: Sometimes true     Ran Out of Food in the Last Year: Sometimes true   Transportation Needs: No Transportation Needs (12/27/2023)    PRAPARE - Transportation     Lack of Transportation (Medical): No     Lack of Transportation (Non-Medical): No   Physical Activity: Sufficiently Active (1/18/2025)    Exercise Vital Sign     Days of Exercise per Week: 6 days     Minutes of Exercise per Session: 50 min   Stress: Stress Concern Present (1/18/2025)    [employer] [place] [employer] - Occupational Stress Questionnaire     Feeling of Stress : To some extent   Housing Stability: Low Risk  (12/27/2023)    Housing Stability Vital Sign     Unable to Pay for Housing in the Last Year: No     Number of Places Lived in the Last Year: 1     Unstable Housing in the Last Year: No       MEDICATIONS & ALLERGIES:     Current Outpatient Medications on File Prior to Visit   Medication Sig    hydroxychloroquine (PLAQUENIL) 200 mg tablet Take 1.5 tablets (300 mg total) by mouth once daily.     No current facility-administered medications on file prior to visit.       Review of patient's allergies indicates:  No Known Allergies    OBJECTIVE:     Vital Signs:  Vitals:    01/28/25 0959   BP: 134/80   BP Location: Right arm   Patient Position: Sitting   Pulse: 67   Resp: 18   Temp: 97.7 °F (36.5 °C)   TempSrc: Oral   SpO2: 99%   Weight: 68.5 kg (151 lb 0.2 oz)   Height: 5' (1.524 m)       Body mass index is 29.49 kg/m².     Physical Exam:  Physical Exam  Vitals and nursing note reviewed.   Constitutional:       " General: She is not in acute distress.     Appearance: She is not ill-appearing.   HENT:      Head: Normocephalic and atraumatic.      Mouth/Throat:      Mouth: Mucous membranes are moist.      Pharynx: Oropharynx is clear. No oropharyngeal exudate or posterior oropharyngeal erythema.   Eyes:      Extraocular Movements: Extraocular movements intact.      Conjunctiva/sclera: Conjunctivae normal.   Cardiovascular:      Rate and Rhythm: Normal rate and regular rhythm.   Pulmonary:      Effort: Pulmonary effort is normal. No respiratory distress.      Breath sounds: Normal breath sounds. No wheezing or rales.   Chest:      Chest wall: No tenderness.   Abdominal:      Palpations: Abdomen is soft.      Tenderness: There is no abdominal tenderness. There is no guarding.   Musculoskeletal:         General: Normal range of motion.      Cervical back: Normal range of motion and neck supple. No tenderness.      Right lower leg: No edema.      Left lower leg: No edema.   Lymphadenopathy:      Cervical: No cervical adenopathy.   Skin:     General: Skin is warm and dry.   Neurological:      Mental Status: She is alert and oriented to person, place, and time.            Laboratory  Lab Results   Component Value Date    WBC 4.95 12/23/2024    HGB 13.6 12/23/2024    HCT 39.9 12/23/2024    MCV 96 12/23/2024     12/23/2024     Lab Results   Component Value Date     12/23/2024     12/23/2024    K 4.2 12/23/2024     12/23/2024    CO2 23 12/23/2024    BUN 11 12/23/2024    CREATININE 0.8 12/23/2024    CALCIUM 8.9 12/23/2024     No results found for: "INR", "PROTIME"  No results found for: "HGBA1C"  No results for input(s): "POCTGLUCOSE" in the last 72 hours.      Health Maintenance         Date Due Completion Date    TETANUS VACCINE Never done ---    Hemoglobin A1c (Diabetic Prevention Screening) Never done ---    Influenza Vaccine (1) 09/01/2024 9/14/2023    COVID-19 Vaccine (3 - 2024-25 season) 09/01/2024 " 12/9/2021    Shingles Vaccine (1 of 2) Never done ---    Colorectal Cancer Screening 04/28/2025 4/11/2023    Mammogram 11/06/2025 11/6/2024    Cervical Cancer Screening 09/14/2028 9/14/2023    Lipid Panel 12/23/2029 12/23/2024    RSV Vaccine (Age 60+ and Pregnant patients) (1 - 1-dose 75+ series) 10/19/2049 ---            ASSESSMENT & PLAN:   49 y.o. female who was seen today in clinic for establishing care    Encounter to establish care  -     COMPREHENSIVE METABOLIC PANEL; Future; Expected date: 04/28/2025    Other systemic lupus erythematosus with other organ involvement    Iron deficiency anemia due to chronic blood loss    Menorrhagia with irregular cycle    Elevated ALT measurement  -     COMPREHENSIVE METABOLIC PANEL; Future; Expected date: 04/28/2025         1. Encounter to establish care    2. Other systemic lupus erythematosus with other organ involvement    3. Iron deficiency anemia due to chronic blood loss    4. Menorrhagia with irregular cycle    5. Elevated ALT measurement        Recent labs 1 month ago reviewed.  Flu vaccine declined.  Recommend shingles vaccine, not available in this clinic.  Chronic, stable.  Continue home meds.  Follows with rheumatology  3/4.  Chronic, stable.  Recently stopped PO supplementation.  Has follow up with hematology and follows with OBGYN.  5.  Noted on recent labs.  Discussed importance of diet and exercise.  Not taking other OTC meds.  Only on Plaquenil.  Recently started strict diet.  Will recheck in 3 months.  Consider imaging vs possible plaquenil SE.      RTC in 6 months or sooner if needed    Christopher Hernandez MD  [facility]

## 2025-01-28 ENCOUNTER — OFFICE VISIT (OUTPATIENT)
Dept: INTERNAL MEDICINE | Facility: CLINIC | Age: 50
End: 2025-01-28
Payer: COMMERCIAL

## 2025-01-28 VITALS
TEMPERATURE: 98 F | SYSTOLIC BLOOD PRESSURE: 134 MMHG | HEART RATE: 67 BPM | RESPIRATION RATE: 18 BRPM | OXYGEN SATURATION: 99 % | WEIGHT: 151 LBS | HEIGHT: 60 IN | DIASTOLIC BLOOD PRESSURE: 80 MMHG | BODY MASS INDEX: 29.64 KG/M2

## 2025-01-28 DIAGNOSIS — N92.1 MENORRHAGIA WITH IRREGULAR CYCLE: ICD-10-CM

## 2025-01-28 DIAGNOSIS — M32.19 OTHER SYSTEMIC LUPUS ERYTHEMATOSUS WITH OTHER ORGAN INVOLVEMENT: ICD-10-CM

## 2025-01-28 DIAGNOSIS — Z76.89 ENCOUNTER TO ESTABLISH CARE: Primary | ICD-10-CM

## 2025-01-28 DIAGNOSIS — R74.01 ELEVATED ALT MEASUREMENT: ICD-10-CM

## 2025-01-28 DIAGNOSIS — D50.0 IRON DEFICIENCY ANEMIA DUE TO CHRONIC BLOOD LOSS: ICD-10-CM

## 2025-01-28 PROBLEM — L30.9 ECZEMA: Status: RESOLVED | Noted: 2020-10-27 | Resolved: 2025-01-28

## 2025-01-28 PROCEDURE — 1159F MED LIST DOCD IN RCRD: CPT | Mod: CPTII,S$GLB,,

## 2025-01-28 PROCEDURE — 99214 OFFICE O/P EST MOD 30 MIN: CPT | Mod: S$GLB,,,

## 2025-01-28 PROCEDURE — 3079F DIAST BP 80-89 MM HG: CPT | Mod: CPTII,S$GLB,,

## 2025-01-28 PROCEDURE — 1160F RVW MEDS BY RX/DR IN RCRD: CPT | Mod: CPTII,S$GLB,,

## 2025-01-28 PROCEDURE — 3008F BODY MASS INDEX DOCD: CPT | Mod: CPTII,S$GLB,,

## 2025-01-28 PROCEDURE — 99999 PR PBB SHADOW E&M-EST. PATIENT-LVL III: CPT | Mod: PBBFAC,,,

## 2025-01-28 PROCEDURE — 3075F SYST BP GE 130 - 139MM HG: CPT | Mod: CPTII,S$GLB,,

## 2025-03-03 ENCOUNTER — PATIENT MESSAGE (OUTPATIENT)
Dept: ADMINISTRATIVE | Facility: HOSPITAL | Age: 50
End: 2025-03-03
Payer: COMMERCIAL

## 2025-03-05 ENCOUNTER — PATIENT OUTREACH (OUTPATIENT)
Dept: ADMINISTRATIVE | Facility: HOSPITAL | Age: 50
End: 2025-03-05
Payer: COMMERCIAL

## 2025-03-05 DIAGNOSIS — Z12.11 SCREENING FOR COLORECTAL CANCER: Primary | ICD-10-CM

## 2025-03-05 DIAGNOSIS — Z12.12 SCREENING FOR COLORECTAL CANCER: Primary | ICD-10-CM

## 2025-05-14 ENCOUNTER — RESULTS FOLLOW-UP (OUTPATIENT)
Dept: INTERNAL MEDICINE | Facility: CLINIC | Age: 50
End: 2025-05-14

## 2025-06-20 NOTE — PROGRESS NOTES
PATIENT: Mckenna Peraza  MRN: [id number]  DATE: 6/26/2025    Diagnosis:   1. History of iron deficiency anemia    2. Menorrhagia with regular cycle    3. Other systemic lupus erythematosus with other organ involvement      Chief Complaint: history of iron deficiency    Subjective:    History of Present Illness: Ms. Peraza is a 49 y.o. female who presented in October 2020 for evaluation and management of iron deficiency anemia. She was referred by Dr. Zambrano.    Telemedicine visit:  The patient location is: home  The chief complaint leading to consultation is: iron deficiency anemia.    Visit type: audiovisual    Face to Face time with patient: 5 minutes  10 minutes of total time spent on the encounter, which includes face to face time and non-face to face time preparing to see the patient (eg, review of tests), Obtaining and/or reviewing separately obtained history, Documenting clinical information in the electronic or other health record, Independently interpreting results (not separately reported) and communicating results to the patient/family/caregiver, or Care coordination (not separately reported).     Each patient to whom he or she provides medical services by telemedicine is:  (1) informed of the relationship between the physician and patient and the respective role of any other health care provider with respect to management of the patient; and (2) notified that he or she may decline to receive medical services by telemedicine and may withdraw from such care at any time.    Notes: see below      - labs in October 2020 reveal a microcytic anemia with intermittent thrombocytosis.  - iron studies (10/19/20) reveal a decreased ferritin/iron/saturated iron.  - she received four doses of iron sucrose in November 2020    Interval history:  - she presents for a follow-up appointment for her iron deficiency anemia.  - today, she is doing well. She denies fatigue, shortness of breath, chest pain, nausea, vomiting,  diarrhea, constipation.     Past medical, surgical, family, and social histories have been reviewed and updated below.    Past Medical History:   Past Medical History:   Diagnosis Date    Abnormal Pap smear of cervix        Past Surgical History:   Past Surgical History:   Procedure Laterality Date    CERVICAL BIOPSY  W/ LOOP ELECTRODE EXCISION       SECTION      five    HERNIA REPAIR      LEFT OOPHORECTOMY         Family History:   Family History   Problem Relation Name Age of Onset    Diabetes Mother Khloe Tucker     Heart disease Mother Khloe Tucker     Diabetes Brother      Diabetes Brother Ruperto Varela     Breast cancer Neg Hx      Colon cancer Neg Hx      Endometrial cancer Neg Hx      Ovarian cancer Neg Hx         Social History:  reports that she has never smoked. She has never been exposed to tobacco smoke. She has never used smokeless tobacco. She reports that she does not currently use alcohol. She reports that she does not use drugs.    Allergies:  Review of patient's allergies indicates:  No Known Allergies    Medications:  Current Outpatient Medications   Medication Sig Dispense Refill    hydroxychloroquine (PLAQUENIL) 200 mg tablet Take 1.5 tablets (300 mg total) by mouth once daily. 135 tablet 3     No current facility-administered medications for this visit.       Review of Systems   Constitutional:  Negative for unexpected weight change.   HENT:  Negative for sore throat.    Eyes:  Negative for visual disturbance.   Respiratory:  Negative for cough and shortness of breath.    Cardiovascular:  Negative for chest pain.   Gastrointestinal:  Negative for abdominal pain, constipation, diarrhea, nausea and vomiting.   Genitourinary:  Negative for dysuria and menstrual problem.   Musculoskeletal:  Negative for back pain.   Skin:  Negative for rash.   Neurological:  Negative for headaches.   Hematological:  Negative for adenopathy.   Psychiatric/Behavioral:  The patient is not  nervous/anxious.        ECOG Performance Status:   ECOG SCORE 0       Objective:      Vitals: There were no vitals filed for this visit.  BMI: There is no height or weight on file to calculate BMI.  Deferred since telemedicine visit    Physical Exam  Deferred since telemedicine visit    Laboratory Data:  Labs have been reviewed.    Lab Results   Component Value Date    WBC 5.61 05/02/2025    HGB 14.1 05/02/2025    HCT 42.0 05/02/2025    MCV 98 05/02/2025     05/02/2025         Imaging:    Assessment:       1. Iron deficiency anemia due to chronic blood loss    2. Menorrhagia with regular cycle    3. Other systemic lupus erythematosus with other organ involvement           Plan:     1. Iron deficiency anemia due to chronic blood loss / reactive thrombocytosis  - I have reviewed her chart.  - labs in October 2020 reveal a microcytic anemia with intermittent thrombocytosis.  - iron studies (10/19/20) reveal a decreased ferritin/iron/saturated iron.  - given the severity of her iron deficiency anemia, I recommended iron sucrose weekly x 4 doses.  - she received four doses of iron sucrose in November 2020  - her periods have resumed since having her IUD removed.  - Labs have been reviewed. Hemoglobin is 14.1 g/dL. Ferritin increased to 50 ng/mL  - repeat labs in 6,12 months.  - return to clinic in 6 months with repeat labs.     2. Menorrhagia with regular cycles.  - the cause of her iron deficiency anemia.  - her periods have resumed since having her IUD removed.  - her menorrhagia has improved.  - defer management to gynecology.    3. Lupus  - symptoms are stable. On hydroxychloroquine  - defer management to rheumatology.    - repeat labs in 6,12 months.  - return to clinic in 6 months with repeat labs.     Len Celis M.D.  Hematology/Oncology  [facility] - [address], [address]  [place] [address]  Phone: [phone]  Fax: [phone]

## 2025-06-26 ENCOUNTER — OFFICE VISIT (OUTPATIENT)
Dept: HEMATOLOGY/ONCOLOGY | Facility: CLINIC | Age: 50
End: 2025-06-26
Payer: COMMERCIAL

## 2025-06-26 DIAGNOSIS — Z86.2 HISTORY OF IRON DEFICIENCY ANEMIA: Primary | ICD-10-CM

## 2025-06-26 DIAGNOSIS — M32.19 OTHER SYSTEMIC LUPUS ERYTHEMATOSUS WITH OTHER ORGAN INVOLVEMENT: ICD-10-CM

## 2025-06-26 DIAGNOSIS — N92.0 MENORRHAGIA WITH REGULAR CYCLE: ICD-10-CM

## 2025-06-26 NOTE — Clinical Note
1. Schedule labs cbc, ferritin, iron/tibc in [place] in 6 and 12 months  2. Schedule f/u virtual visit 1 day after labs in one year.   Thanks!

## 2025-07-08 ENCOUNTER — OFFICE VISIT (OUTPATIENT)
Dept: RHEUMATOLOGY | Facility: CLINIC | Age: 50
End: 2025-07-08
Payer: COMMERCIAL

## 2025-07-08 ENCOUNTER — TELEPHONE (OUTPATIENT)
Dept: RHEUMATOLOGY | Facility: CLINIC | Age: 50
End: 2025-07-08

## 2025-07-08 DIAGNOSIS — M25.511 CHRONIC RIGHT SHOULDER PAIN: ICD-10-CM

## 2025-07-08 DIAGNOSIS — M32.19 OTHER SYSTEMIC LUPUS ERYTHEMATOSUS WITH OTHER ORGAN INVOLVEMENT: Primary | ICD-10-CM

## 2025-07-08 DIAGNOSIS — G89.29 CHRONIC RIGHT SHOULDER PAIN: ICD-10-CM

## 2025-07-08 DIAGNOSIS — Z79.899 HIGH RISK MEDICATION USE: Chronic | ICD-10-CM

## 2025-07-08 PROCEDURE — G2211 COMPLEX E/M VISIT ADD ON: HCPCS | Mod: 95,,, | Performed by: INTERNAL MEDICINE

## 2025-07-08 PROCEDURE — 98006 SYNCH AUDIO-VIDEO EST MOD 30: CPT | Mod: 95,,, | Performed by: INTERNAL MEDICINE

## 2025-07-08 ASSESSMENT — ROUTINE ASSESSMENT OF PATIENT INDEX DATA (RAPID3)
PATIENT GLOBAL ASSESSMENT SCORE: 0.5
FATIGUE SCORE: 0.5
TOTAL RAPID3 SCORE: 0.33
PSYCHOLOGICAL DISTRESS SCORE: 3.3
MDHAQ FUNCTION SCORE: 0
PAIN SCORE: 0.5

## 2025-07-08 NOTE — ASSESSMENT & PLAN NOTE
No new or recurrent SLE symptoms at this time and recent labs have been unrevealing    She should have additional labs including complement levels and urine studies to complete her evaluation, but will continue hydroxychloroquine monotherapy for now and if labs are ok then return to clinic me 6 months with labs

## 2025-07-08 NOTE — PROGRESS NOTES
The patient location is: [place]  The chief complaint leading to consultation is: SLE f/u    Visit type: audiovisual    Face to Face time with patient: 10  20 minutes of total time spent on the encounter, which includes face to face time and non-face to face time preparing to see the patient (eg, review of tests), Obtaining and/or reviewing separately obtained history, Documenting clinical information in the electronic or other health record, Independently interpreting results (not separately reported) and communicating results to the patient/family/caregiver, or Care coordination (not separately reported).     Each patient to whom he or she provides medical services by telemedicine is:  (1) informed of the relationship between the physician and patient and the respective role of any other health care provider with respect to management of the patient; and (2) notified that he or she may decline to receive medical services by telemedicine and may withdraw from such care at any time.    Notes:             Subjective:       Patient ID: Mckenna Peraza is a 49 y.o. female.    Chief Complaint: No chief complaint on file.    HPI    , lives in [place]  Has 2 small children     2020 had a lot of pain and had +REAGAN/SSA, low +IgM APA x1; neg Myomarker panel x SSA  Started hydroxychloroquine and symptoms resolved   [id number]; all C symptoms failure to progress; unilateral ooph and tubal ligation      mg/d     History eczema x 8 years ;   Has had skin lesions that responded to topicals; elbows, back, legs and face; Bx read as most likely SCLE  Saw me May 2023; REAGAN 1:540, homo & speckled, SSA 4.9; neg DNA and Sm, nl C3/C4; neg urine protein/creatinine ratio ; neg anti-phospholipid antibody panel     Had rash in March and 2024 for about 5 week , face/ ears, and L neck/shoulder    She likes to cook and to know where her food comes from  Exercises 30 min 4d/week, plus trampoline   Cleans  houses    Doing well ; no physical issues; exercising   R shoulder pain about 4 month ago that is fine now  Avoids sun  Went to eye doctor last year; needs to make appointment    Review of Systems   Constitutional:  Negative for fever and unexpected weight change.   HENT:  Negative for mouth sores and trouble swallowing.    Eyes:  Negative for redness.   Respiratory:  Negative for cough and shortness of breath.    Cardiovascular:  Negative for chest pain.   Gastrointestinal:  Negative for constipation and diarrhea.   Genitourinary:  Negative for dysuria and genital sores.   Skin:  Positive for rash.   Neurological:  Positive for headaches.   Hematological:  Does not bruise/bleed easily.           7/1/2025     8:44 PM   Rapid3 Question Responses and Scores   MDHAQ Score 0   Psychologic Score 3.3   Pain Score 0.5   When you awakened in the morning OVER THE LAST WEEK, did you feel stiff? No   Fatigue Score 0.5   Global Health Score 0.5   RAPID3 Score 0.33      Objective:   There were no vitals taken for this visit.     Physical Exam      Assessment:       1. Other systemic lupus erythematosus with other organ involvement    2. High risk medication use    3. Chronic right shoulder pain            Plan:       Problem List Items Addressed This Visit          Active Problems    High risk medication use (Chronic)    She had hydroxychloroquine eye check last year but needs follow up and says she will schedule it.          Systemic lupus erythematosus - Primary    No new or recurrent SLE symptoms at this time and recent labs have been unrevealing    She should have additional labs including complement levels and urine studies to complete her evaluation, but will continue hydroxychloroquine monotherapy for now and if labs are ok then return to clinic me 6 months with labs         Relevant Orders    Hydroxychloroquine (Plaquenil), blood    Chronic right shoulder pain    This is less symptomatic now

## 2025-07-08 NOTE — ASSESSMENT & PLAN NOTE
She had hydroxychloroquine eye check last year but needs follow up and says she will schedule it.

## 2025-07-29 NOTE — PROGRESS NOTES
Mckenna Peraza  1975        Subjective     Chief Complaint: Follow-up (6 months)      History of Present Illness:  Ms. Mckenna Peraza is a 49 y.o. female who presents to clinic for follow up      Mammo: utd, next due 2025  Pap: utd, next due 2028  C-scope: utd, next due 2028      Feeling well today.  Reports having heavy menstrual cycles in past requiring IV iron infusions years ago then transitioned to PO, has been off all iron supplements for a few years with stable Hgb.  Denies chest pain, dyspnea, lightheadedness, dizziness.      Review of Systems   Constitutional:  Negative for chills and fever.   Respiratory:  Negative for shortness of breath.    Cardiovascular:  Negative for chest pain and palpitations.   Gastrointestinal:  Negative for abdominal pain, blood in stool, constipation, diarrhea, nausea and vomiting.   Genitourinary:  Negative for dysuria and hematuria.   Neurological:  Negative for dizziness and headaches.        PAST HISTORY:     Past Medical History:   Diagnosis Date    Abnormal Pap smear of cervix        Past Surgical History:   Procedure Laterality Date    CERVICAL BIOPSY  W/ LOOP ELECTRODE EXCISION       SECTION      five    HERNIA REPAIR      LEFT OOPHORECTOMY         Family History   Problem Relation Name Age of Onset    Diabetes Mother Khloe Tucker     Heart disease Mother Khloe Tucker     Diabetes Brother      Diabetes Brother Ruperto Varela     Breast cancer Neg Hx      Colon cancer Neg Hx      Endometrial cancer Neg Hx      Ovarian cancer Neg Hx         Social History     Socioeconomic History    Marital status:    Tobacco Use    Smoking status: Never     Passive exposure: Never    Smokeless tobacco: Never   Substance and Sexual Activity    Alcohol use: Not Currently     Comment: Only drink about 4 to 5 times a year    Drug use: Never    Sexual activity: Yes     Partners: Male     Birth control/protection: I.U.D., None     Comment: Im still pms every  2 weeks,     Social Drivers of Health     Financial Resource Strain: Medium Risk (1/18/2025)    Overall Financial Resource Strain (CARDIA)     Difficulty of Paying Living Expenses: Somewhat hard   Food Insecurity: Food Insecurity Present (1/18/2025)    Hunger Vital Sign     Worried About Running Out of Food in the Last Year: Sometimes true     Ran Out of Food in the Last Year: Sometimes true   Transportation Needs: No Transportation Needs (12/27/2023)    PRAPARE - Transportation     Lack of Transportation (Medical): No     Lack of Transportation (Non-Medical): No   Physical Activity: Sufficiently Active (1/18/2025)    Exercise Vital Sign     Days of Exercise per Week: 6 days     Minutes of Exercise per Session: 50 min   Stress: Stress Concern Present (1/18/2025)    [employer] [place] [employer] - Occupational Stress Questionnaire     Feeling of Stress : To some extent   Housing Stability: Low Risk  (12/27/2023)    Housing Stability Vital Sign     Unable to Pay for Housing in the Last Year: No     Number of Places Lived in the Last Year: 1     Unstable Housing in the Last Year: No       MEDICATIONS & ALLERGIES:     Current Outpatient Medications on File Prior to Visit   Medication Sig    hydroxychloroquine (PLAQUENIL) 200 mg tablet Take 1.5 tablets (300 mg total) by mouth once daily.     No current facility-administered medications on file prior to visit.       Review of patient's allergies indicates:  No Known Allergies    OBJECTIVE:     Vital Signs:  Vitals:    07/31/25 1019   BP: 138/78   BP Location: Right arm   Patient Position: Sitting   Pulse: 81   Resp: 18   Temp: 98.6 °F (37 °C)   TempSrc: Oral   SpO2: 97%   Weight: 67.2 kg (148 lb 2.4 oz)   Height: 5' (1.524 m)       Body mass index is 28.93 kg/m².     Physical Exam:  Physical Exam  Vitals and nursing note reviewed.   Constitutional:       General: She is not in acute distress.     Appearance: She is not ill-appearing.   HENT:      Head:  "Normocephalic and atraumatic.      Mouth/Throat:      Mouth: Mucous membranes are moist.      Pharynx: Oropharynx is clear. No oropharyngeal exudate or posterior oropharyngeal erythema.   Eyes:      Extraocular Movements: Extraocular movements intact.      Conjunctiva/sclera: Conjunctivae normal.   Cardiovascular:      Rate and Rhythm: Normal rate and regular rhythm.   Pulmonary:      Effort: Pulmonary effort is normal. No respiratory distress.      Breath sounds: Normal breath sounds. No wheezing or rales.   Chest:      Chest wall: No tenderness.   Abdominal:      Palpations: Abdomen is soft.      Tenderness: There is no abdominal tenderness. There is no guarding.   Musculoskeletal:         General: Normal range of motion.      Cervical back: Normal range of motion and neck supple. No tenderness.      Right lower leg: No edema.      Left lower leg: No edema.   Lymphadenopathy:      Cervical: No cervical adenopathy.   Skin:     General: Skin is warm and dry.   Neurological:      Mental Status: She is alert and oriented to person, place, and time.            Laboratory  Lab Results   Component Value Date    WBC 5.61 05/02/2025    HGB 14.1 05/02/2025    HCT 42.0 05/02/2025    MCV 98 05/02/2025     05/02/2025     Lab Results   Component Value Date     07/11/2025     07/11/2025    K 4.5 07/11/2025     07/11/2025    CO2 26 07/11/2025    BUN 11 07/11/2025    CREATININE 0.7 07/11/2025    CALCIUM 8.7 07/11/2025     No results found for: "INR", "PROTIME"  No results found for: "HGBA1C"  No results for input(s): "POCTGLUCOSE" in the last 72 hours.      Health Maintenance         Date Due Completion Date    TETANUS VACCINE Never done ---    Hemoglobin A1c (Diabetic Prevention Screening) Never done ---    COVID-19 Vaccine (3 - 2024-25 season) 09/01/2024 12/9/2021    Shingles Vaccine (1 of 2) Never done ---    Influenza Vaccine (1) 09/01/2025 1/28/2025 (Declined)    Override on 1/28/2025: Declined    " Mammogram 11/06/2025 11/6/2024    Colorectal Cancer Screening 05/08/2028 5/8/2025    Cervical Cancer Screening 09/14/2028 9/14/2023    Lipid Panel 12/23/2029 12/23/2024    RSV Vaccine (Age 60+ and Pregnant patients) (1 - 1-dose 75+ series) 10/19/2049 ---            ASSESSMENT & PLAN:   49 y.o. female who was seen today in clinic for follow up    Other systemic lupus erythematosus with other organ involvement    Iron deficiency anemia due to chronic blood loss    Menorrhagia with irregular cycle    Annual wellness visit  -     Hemoglobin A1C; Future; Expected date: 01/31/2026  -     CBC Without Differential; Future; Expected date: 01/31/2026  -     Comprehensive Metabolic Panel; Future; Expected date: 01/31/2026  -     TSH; Future; Expected date: 01/31/2026  -     Lipid Panel; Future; Expected date: 01/31/2026         1. Other systemic lupus erythematosus with other organ involvement    2. Iron deficiency anemia due to chronic blood loss    3. Menorrhagia with irregular cycle    4. Annual wellness visit        Chronic, stable. Continue home meds and close follow up with rheumatology.  2/3.  Stable, diet controlled s/p IV and PO supplements in past.  Currently going through menopause and lightened/irregular menstrual cycles.  Follow up with OBGYN  4.  Annual labs ordered for upcoming annual    Recommend shingles vaccine at outside pharmacy as not available in clinic          RTC in 6 months for annual with labs prior    Christopher Hernandez MD  [facility]

## 2025-07-31 ENCOUNTER — OFFICE VISIT (OUTPATIENT)
Dept: INTERNAL MEDICINE | Facility: CLINIC | Age: 50
End: 2025-07-31
Payer: COMMERCIAL

## 2025-07-31 VITALS
WEIGHT: 148.13 LBS | TEMPERATURE: 99 F | OXYGEN SATURATION: 97 % | BODY MASS INDEX: 29.08 KG/M2 | RESPIRATION RATE: 18 BRPM | HEART RATE: 81 BPM | SYSTOLIC BLOOD PRESSURE: 138 MMHG | DIASTOLIC BLOOD PRESSURE: 78 MMHG | HEIGHT: 60 IN

## 2025-07-31 DIAGNOSIS — D50.0 IRON DEFICIENCY ANEMIA DUE TO CHRONIC BLOOD LOSS: ICD-10-CM

## 2025-07-31 DIAGNOSIS — Z00.00 ANNUAL WELLNESS VISIT: ICD-10-CM

## 2025-07-31 DIAGNOSIS — N92.1 MENORRHAGIA WITH IRREGULAR CYCLE: ICD-10-CM

## 2025-07-31 DIAGNOSIS — M32.19 OTHER SYSTEMIC LUPUS ERYTHEMATOSUS WITH OTHER ORGAN INVOLVEMENT: Primary | ICD-10-CM

## 2025-07-31 PROCEDURE — 99999 PR PBB SHADOW E&M-EST. PATIENT-LVL III: CPT | Mod: PBBFAC,,,

## 2025-08-05 DIAGNOSIS — M32.19 OTHER SYSTEMIC LUPUS ERYTHEMATOSUS WITH OTHER ORGAN INVOLVEMENT: ICD-10-CM

## 2025-08-08 RX ORDER — HYDROXYCHLOROQUINE SULFATE 200 MG/1
300 TABLET, FILM COATED ORAL DAILY
Qty: 135 TABLET | Refills: 0 | Status: SHIPPED | OUTPATIENT
Start: 2025-08-08

## 2025-08-08 NOTE — TELEPHONE ENCOUNTER
Medication refill requested for plaquenil.  Last office visit on 7/8/25. Labs done on 7/11/25.     Patient states she has not gotten her eye exam yet.  States she will call and make an appointment. Patient advised to message when she gets her eye exam.

## (undated) DEVICE — ADHESIVE DERMABOND ADVANCED

## (undated) DEVICE — ELECTRODE REM PLYHSV RETURN 9

## (undated) DEVICE — COVER OVERHEAD SURG LT BLUE

## (undated) DEVICE — DRESSING TRANS 4X4 3/4

## (undated) DEVICE — SUT VICRYL CTD 2-0 GI 27 SH

## (undated) DEVICE — GLOVE SURGICAL LATEX SZ 8

## (undated) DEVICE — SEE MEDLINE ITEM 152622

## (undated) DEVICE — APPLICATOR CHLORAPREP ORN 26ML

## (undated) DEVICE — SEE MEDLINE ITEM 156955

## (undated) DEVICE — GAUZE SPONGE 4X4 12PLY

## (undated) DEVICE — CLOSURE SKIN STERI STRIP 1/2X4

## (undated) DEVICE — PACK BASIC

## (undated) DEVICE — SYR 10CC LUER LOCK

## (undated) DEVICE — SUT VICRYL 3-0 27 SH

## (undated) DEVICE — SEE MEDLINE ITEM 157116

## (undated) DEVICE — NDL 22GA X1 1/2 REG BEVEL

## (undated) DEVICE — MANIFOLD 4 PORT

## (undated) DEVICE — SEE MEDLINE ITEM 157148